# Patient Record
Sex: MALE | Race: WHITE | Employment: UNEMPLOYED | URBAN - METROPOLITAN AREA
[De-identification: names, ages, dates, MRNs, and addresses within clinical notes are randomized per-mention and may not be internally consistent; named-entity substitution may affect disease eponyms.]

---

## 2017-04-13 ENCOUNTER — HOSPITAL ENCOUNTER (OUTPATIENT)
Dept: MRI IMAGING | Age: 22
Discharge: HOME | End: 2017-04-13
Attending: ORTHOPAEDIC SURGERY
Payer: MEDICAID

## 2017-04-13 DIAGNOSIS — M25.461 PAIN AND SWELLING OF RIGHT KNEE: ICD-10-CM

## 2017-04-13 DIAGNOSIS — M25.561 PAIN AND SWELLING OF RIGHT KNEE: ICD-10-CM

## 2017-04-13 PROCEDURE — 73721 MRI JNT OF LWR EXTRE W/O DYE: CPT

## 2017-12-03 ENCOUNTER — HOSPITAL ENCOUNTER (EMERGENCY)
Age: 22
Discharge: HOME | End: 2017-12-03
Attending: EMERGENCY MEDICINE
Payer: MEDICAID

## 2017-12-03 VITALS
HEART RATE: 96 BPM | RESPIRATION RATE: 17 BRPM | SYSTOLIC BLOOD PRESSURE: 135 MMHG | BODY MASS INDEX: 25.9 KG/M2 | TEMPERATURE: 98.5 F | WEIGHT: 185 LBS | HEIGHT: 71 IN | OXYGEN SATURATION: 100 % | DIASTOLIC BLOOD PRESSURE: 86 MMHG

## 2017-12-03 DIAGNOSIS — T69.029A TRENCH FOOT, UNSPECIFIED LATERALITY, INITIAL ENCOUNTER: Primary | ICD-10-CM

## 2017-12-03 PROCEDURE — 99282 EMERGENCY DEPT VISIT SF MDM: CPT

## 2017-12-03 RX ORDER — CLOTRIMAZOLE AND BETAMETHASONE DIPROPIONATE 10; .64 MG/G; MG/G
CREAM TOPICAL 2 TIMES DAILY
Qty: 1 TUBE | Refills: 0 | Status: SHIPPED | OUTPATIENT
Start: 2017-12-03 | End: 2018-04-01
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
## 2017-12-03 NOTE — DISCHARGE INSTRUCTIONS
Keep feet dry. Change socks frequently. Antifungals (On the skin)   Treat fungus infections of the skin. Some of these infections are \"athlete's foot,\" \"jock itch,\" and yeast infections. Antifungal shampoo is used to treat dandruff. Brand Name(s): Aloe Vesta Antifungal, Anti-Fungal Powder, Antifungal, Athlete's Foot, Athlete's Foot Cream, Athlete's Foot Powder, Azolen, Bactivex Athlete's Foot & Cracked Heels, Renaldo Antifungal, Blis-To-Sol, Breezee Mist Foot Powder, CNL8, [place] Antifungal, Ciclodan, Ciclodan Cream Kit   There may be other brand names for this medicine. When This Medicine Should Not Be Used: You should not use this medicine if you have ever had an allergic reaction to antifungals such as econazole, ketoconazole, miconazole, butoconazole, clotrimazole, or terconazole. Unless your doctor tells you to, you should not use topical antifungals on children under 3years of age. You should not use for diaper rash. How to Use This Medicine:   Cream, Powder, Spray, Foam, Shampoo, Lotion, Gel/Jelly, Ointment, Spray, Liquid, Fizzy Tablet, Powder for Solution  · Your doctor will tell you how much medicine to use. Do not use more than directed. · Follow the instructions on the medicine label if you are using this medicine without a prescription. · Wash your hands with soap and water before and after you use this medicine. · If you are using this medicine with a prescription, the medicine might come with patient instructions. Read and follow these instructions carefully. Ask your doctor or pharmacist if you have any questions. · Use this medicine only on your skin. Rinse it off right away if it gets on a cut or scrape. Do not get the medicine in your eyes, nose, or mouth. · To use the cream, liquid, powder, lotion, gel, or ointment: Before putting this medicine on, wash the skin with soap and water, and then dry with a towel. Apply a thin layer of the medicine to the affected area.  Rub it in gently. Use on both feet if you have athlete's foot. You might need to shake the liquid or lotion before using it. · To use the powder: Before putting this medicine on, wash the skin with soap and water, and then dry with a towel. Apply a thin layer to the affected area. Use on both feet and inside your socks if you have athlete's foot. · To use the spray: Shake the spray can just before using. Hold the can 6 to 10 inches from the skin and spray. If you use the aerosol spray on your feet, spray it on the top, bottom, and between the toes of both feet. Also spray some in your socks and shoes (treat both feet the same). · To use the shampoo: Wet hair and scalp thoroughly with water. Work the shampoo into a lather and gently massage it over your whole scalp for about 1 minute. Rinse the hair thoroughly. Wash hair again and leave the shampoo on your scalp for another 3 minutes. Shampoo every 4 days for 4 weeks. Always allow at least 3 days between using the shampoo. · Keep using the medicine for as long as your doctor has told you to, even if you think your symptoms are gone. If you do not use your medicine for the full treatment time, your infection may return. · Do not inhale the aerosol spray or use it near heat, open flame, or while smoking. Do not puncture, break, or burn the aerosol can. · Throw away the pad or swab after using it. Do not save an opened pad or swab to use later. · Do not cover the treated area with a bandage unless directed by your doctor. If a dose is missed:   · Take a dose as soon as you remember. If it is almost time for your next dose, wait until then and take a regular dose. Do not take extra medicine to make up for a missed dose. How to Store and Dispose of This Medicine:   · Store the medicine in a closed container at room temperature, away from heat, moisture, and direct light. Do not freeze.   · Keep all medicine away from children and never share your medicine with anyone. Drugs and Foods to Avoid:   Ask your doctor or pharmacist before using any other medicine, including over-the-counter medicines, vitamins, and herbal products. · You should not use other medicines on the same area of skin unless your doctor tells you to. · Do not put cosmetics or skin care products on the treated skin. Warnings While Using This Medicine:   · If you are pregnant (or may become pregnant) or breastfeeding, talk to your doctor before using this medicine. · The liquid and spray forms of this medicine might be flammable. Keep them away from high heat and open flames at all times. · If your symptoms do not get better after about a week, or get worse while using this medicine, call your doctor. · Antifungal shampoo may remove the curls from hair that has been treated with a permanent. · Each antifungal medicine was made to treat a certain kind of infection. Use only the medicine your doctor has prescribed. You should not use an over-the-counter product in place of what your doctor has told you to use. Possible Side Effects While Using This Medicine:   Call your doctor right away if you notice any of these side effects:  · Itching, rash, swelling, or redness that was not there before you used this medicine  If you notice these less serious side effects, talk with your doctor:   · Stinging, burning, or redness where medicine is put on  · Dry or oily hair (with shampoo use)  If you notice other side effects that you think are caused by this medicine, tell your doctor. Call your doctor for medical advice about side effects. You may report side effects to FDA at 5-944-FDA-6803  © 2017 [address] [employer] [employer] Information is for End User's use only and may not be sold, redistributed or otherwise used for commercial purposes. The above information is an  only. It is not intended as medical advice for individual conditions or treatments.  Talk to your doctor, nurse or pharmacist before following any medical regimen to see if it is safe and effective for you.

## 2018-04-01 ENCOUNTER — HOSPITAL ENCOUNTER (EMERGENCY)
Age: 23
Discharge: ANOTHER HOSPITAL | End: 2018-04-01
Attending: EMERGENCY MEDICINE
Payer: MEDICAID

## 2018-04-01 ENCOUNTER — APPOINTMENT (OUTPATIENT)
Dept: GENERAL RADIOLOGY | Age: 23
End: 2018-04-01
Attending: EMERGENCY MEDICINE
Payer: MEDICAID

## 2018-04-01 ENCOUNTER — HOSPITAL ENCOUNTER (INPATIENT)
Age: 23
LOS: 4 days | Discharge: HOME | DRG: 750 | End: 2018-04-05
Attending: PSYCHIATRY & NEUROLOGY | Admitting: PSYCHIATRY & NEUROLOGY
Payer: MEDICAID

## 2018-04-01 VITALS
SYSTOLIC BLOOD PRESSURE: 108 MMHG | BODY MASS INDEX: 25.1 KG/M2 | WEIGHT: 180 LBS | HEART RATE: 86 BPM | TEMPERATURE: 98.2 F | DIASTOLIC BLOOD PRESSURE: 68 MMHG | RESPIRATION RATE: 16 BRPM | OXYGEN SATURATION: 98 %

## 2018-04-01 DIAGNOSIS — R45.851 SUICIDAL IDEATIONS: ICD-10-CM

## 2018-04-01 DIAGNOSIS — F20.9 SCHIZOPHRENIA, UNSPECIFIED TYPE (HCC): Primary | ICD-10-CM

## 2018-04-01 DIAGNOSIS — Z86.59 HISTORY OF COMMAND HALLUCINATIONS: ICD-10-CM

## 2018-04-01 PROBLEM — F25.9 SCHIZOAFFECTIVE DISORDER (HCC): Status: ACTIVE | Noted: 2018-04-01

## 2018-04-01 LAB
AMPHET UR QL SCN: NEGATIVE
ANION GAP SERPL CALC-SCNC: 5 MMOL/L (ref 3–18)
BARBITURATES UR QL SCN: NEGATIVE
BASOPHILS # BLD: 0 K/UL (ref 0–0.06)
BASOPHILS NFR BLD: 1 % (ref 0–2)
BENZODIAZ UR QL: NEGATIVE
BUN SERPL-MCNC: 9 MG/DL (ref 7–18)
BUN/CREAT SERPL: 13 (ref 12–20)
CALCIUM SERPL-MCNC: 8.9 MG/DL (ref 8.5–10.1)
CANNABINOIDS UR QL SCN: POSITIVE
CHLORIDE SERPL-SCNC: 111 MMOL/L (ref 100–108)
CO2 SERPL-SCNC: 25 MMOL/L (ref 21–32)
COCAINE UR QL SCN: NEGATIVE
CREAT SERPL-MCNC: 0.7 MG/DL (ref 0.6–1.3)
DIFFERENTIAL METHOD BLD: ABNORMAL
EOSINOPHIL # BLD: 0.1 K/UL (ref 0–0.4)
EOSINOPHIL NFR BLD: 2 % (ref 0–5)
ERYTHROCYTE [DISTWIDTH] IN BLOOD BY AUTOMATED COUNT: 12.9 % (ref 11.6–14.5)
ETHANOL SERPL-MCNC: <3 MG/DL (ref 0–3)
GLUCOSE SERPL-MCNC: 83 MG/DL (ref 74–99)
HCT VFR BLD AUTO: 44.6 % (ref 36–48)
HDSCOM,HDSCOM: ABNORMAL
HGB BLD-MCNC: 15.3 G/DL (ref 13–16)
LITHIUM SERPL-SCNC: <0.2 MMOL/L (ref 0.6–1.2)
LYMPHOCYTES # BLD: 1.2 K/UL (ref 0.9–3.6)
LYMPHOCYTES NFR BLD: 18 % (ref 21–52)
MCH RBC QN AUTO: 31.1 PG (ref 24–34)
MCHC RBC AUTO-ENTMCNC: 34.3 G/DL (ref 31–37)
MCV RBC AUTO: 90.7 FL (ref 74–97)
METHADONE UR QL: NEGATIVE
MONOCYTES # BLD: 0.5 K/UL (ref 0.05–1.2)
MONOCYTES NFR BLD: 8 % (ref 3–10)
NEUTS SEG # BLD: 4.7 K/UL (ref 1.8–8)
NEUTS SEG NFR BLD: 71 % (ref 40–73)
OPIATES UR QL: NEGATIVE
PCP UR QL: NEGATIVE
PLATELET # BLD AUTO: 119 K/UL (ref 135–420)
PMV BLD AUTO: 11 FL (ref 9.2–11.8)
POTASSIUM SERPL-SCNC: 3.8 MMOL/L (ref 3.5–5.5)
RBC # BLD AUTO: 4.92 M/UL (ref 4.7–5.5)
SODIUM SERPL-SCNC: 141 MMOL/L (ref 136–145)
WBC # BLD AUTO: 6.6 K/UL (ref 4.6–13.2)

## 2018-04-01 PROCEDURE — 80307 DRUG TEST PRSMV CHEM ANLYZR: CPT | Performed by: EMERGENCY MEDICINE

## 2018-04-01 PROCEDURE — 80178 ASSAY OF LITHIUM: CPT | Performed by: EMERGENCY MEDICINE

## 2018-04-01 PROCEDURE — 85025 COMPLETE CBC W/AUTO DIFF WBC: CPT | Performed by: EMERGENCY MEDICINE

## 2018-04-01 PROCEDURE — 65220000003 HC RM SEMIPRIVATE PSYCH

## 2018-04-01 PROCEDURE — 80048 BASIC METABOLIC PNL TOTAL CA: CPT | Performed by: EMERGENCY MEDICINE

## 2018-04-01 PROCEDURE — 74011250637 HC RX REV CODE- 250/637: Performed by: EMERGENCY MEDICINE

## 2018-04-01 PROCEDURE — 99285 EMERGENCY DEPT VISIT HI MDM: CPT

## 2018-04-01 PROCEDURE — 73564 X-RAY EXAM KNEE 4 OR MORE: CPT

## 2018-04-01 RX ORDER — LORAZEPAM 1 MG/1
1 TABLET ORAL
Status: DISCONTINUED | OUTPATIENT
Start: 2018-04-01 | End: 2018-04-05 | Stop reason: HOSPADM

## 2018-04-01 RX ORDER — TRAZODONE HYDROCHLORIDE 100 MG/1
100 TABLET ORAL
Status: DISCONTINUED | OUTPATIENT
Start: 2018-04-01 | End: 2018-04-01 | Stop reason: HOSPADM

## 2018-04-01 RX ORDER — RISPERIDONE 1 MG/1
4 TABLET, FILM COATED ORAL DAILY
Status: DISCONTINUED | OUTPATIENT
Start: 2018-04-01 | End: 2018-04-01 | Stop reason: HOSPADM

## 2018-04-01 RX ORDER — LORAZEPAM 1 MG/1
1 TABLET ORAL
Status: DISCONTINUED | OUTPATIENT
Start: 2018-04-01 | End: 2018-04-01 | Stop reason: HOSPADM

## 2018-04-01 RX ORDER — IBUPROFEN 400 MG/1
400 TABLET ORAL
Status: COMPLETED | OUTPATIENT
Start: 2018-04-01 | End: 2018-04-01

## 2018-04-01 RX ORDER — ACETAMINOPHEN 500 MG
1000 TABLET ORAL
Status: COMPLETED | OUTPATIENT
Start: 2018-04-01 | End: 2018-04-01

## 2018-04-01 RX ORDER — LORAZEPAM 2 MG/ML
1 INJECTION INTRAMUSCULAR
Status: DISCONTINUED | OUTPATIENT
Start: 2018-04-01 | End: 2018-04-05 | Stop reason: HOSPADM

## 2018-04-01 RX ORDER — BENZTROPINE MESYLATE 0.5 MG/1
1 TABLET ORAL 2 TIMES DAILY
COMMUNITY
End: 2018-04-05

## 2018-04-01 RX ORDER — RISPERIDONE 0.5 MG/1
0.5 TABLET, FILM COATED ORAL 2 TIMES DAILY
Status: DISCONTINUED | OUTPATIENT
Start: 2018-04-02 | End: 2018-04-02

## 2018-04-01 RX ORDER — TRAZODONE HYDROCHLORIDE 100 MG/1
100 TABLET ORAL
Status: DISCONTINUED | OUTPATIENT
Start: 2018-04-02 | End: 2018-04-05 | Stop reason: HOSPADM

## 2018-04-01 RX ORDER — LORAZEPAM 2 MG/ML
2 INJECTION INTRAMUSCULAR
Status: DISCONTINUED | OUTPATIENT
Start: 2018-04-01 | End: 2018-04-02

## 2018-04-01 RX ORDER — LORAZEPAM 1 MG/1
2 TABLET ORAL
Status: DISCONTINUED | OUTPATIENT
Start: 2018-04-01 | End: 2018-04-02

## 2018-04-01 RX ORDER — BENZTROPINE MESYLATE 1 MG/1
1-2 TABLET ORAL
Status: DISCONTINUED | OUTPATIENT
Start: 2018-04-01 | End: 2018-04-02

## 2018-04-01 RX ORDER — HALOPERIDOL 5 MG/ML
5 INJECTION INTRAMUSCULAR
Status: DISCONTINUED | OUTPATIENT
Start: 2018-04-01 | End: 2018-04-05 | Stop reason: HOSPADM

## 2018-04-01 RX ORDER — HALOPERIDOL 5 MG/1
5 TABLET ORAL
Status: DISCONTINUED | OUTPATIENT
Start: 2018-04-01 | End: 2018-04-05 | Stop reason: HOSPADM

## 2018-04-01 RX ORDER — LITHIUM CARBONATE 300 MG/1
300 CAPSULE ORAL 2 TIMES DAILY WITH MEALS
COMMUNITY
End: 2018-04-05

## 2018-04-01 RX ORDER — BENZTROPINE MESYLATE 1 MG/1
0.5 TABLET ORAL DAILY
Status: DISCONTINUED | OUTPATIENT
Start: 2018-04-01 | End: 2018-04-01 | Stop reason: HOSPADM

## 2018-04-01 RX ORDER — BENZTROPINE MESYLATE 1 MG/ML
1-2 INJECTION INTRAMUSCULAR; INTRAVENOUS
Status: DISCONTINUED | OUTPATIENT
Start: 2018-04-01 | End: 2018-04-02

## 2018-04-01 RX ORDER — RISPERIDONE 4 MG/1
4 TABLET, FILM COATED ORAL 2 TIMES DAILY
COMMUNITY
End: 2018-04-05

## 2018-04-01 RX ADMIN — RISPERIDONE 4 MG: 1 TABLET ORAL at 16:34

## 2018-04-01 RX ADMIN — IBUPROFEN 400 MG: 400 TABLET, FILM COATED ORAL at 13:57

## 2018-04-01 RX ADMIN — ACETAMINOPHEN 1000 MG: 500 TABLET ORAL at 13:57

## 2018-04-01 RX ADMIN — BENZTROPINE MESYLATE 0.5 MG: 1 TABLET ORAL at 16:33

## 2018-04-01 NOTE — ED NOTES
Bedside and Verbal shift change report given to Batsheva Zhao RN (oncoming nurse) by Valentin Coronado RN (offgoing nurse). Report included the following information ED Summary and MAR. Patient drinking water but still unable to void. Left knee pain 5/10.

## 2018-04-01 NOTE — PROGRESS NOTES
[place] back from [facility] admissions for Parth Josue. She said they needed the lithium levels returned before she could present patient for admission to psychiatry. Informed her that I would be leaving and to call back the main ED number when / if a bed is available.

## 2018-04-01 NOTE — IP AVS SNAPSHOT
[facility] 
 
 
 [address] [facility] [address] [place] [address] Drive Patient: Chase De Anda MRN: [id number] :[id number] A check angeline indicates which time of day the medication should be taken. My Medications CHANGE how you take these medications Instructions Each Dose to Equal  
 Morning Noon Evening Bedtime  
 risperiDONE 1 mg tablet Commonly known as:  RisperDAL What changed:   
- medication strength 
- how much to take - when to take this Your last dose was: Your next dose is: Take 1 Tab by mouth two (2) times a day. Indications: mood stabilization 1 mg STOP taking these medications   
 benztropine 0.5 mg tablet Commonly known as:  COGENTIN  
   
  
 lithium carbonate 300 mg capsule Where to Get Your Medications Information on where to get these meds will be given to you by the nurse or doctor. ! Ask your nurse or doctor about these medications  
  risperiDONE 1 mg tablet

## 2018-04-01 NOTE — PROGRESS NOTES
Patient in need of inpatient psychiatric bed. Referred to [facility] admitting. They will review and call back.

## 2018-04-01 NOTE — ED PROVIDER NOTES
EMERGENCY DEPARTMENT HISTORY AND PHYSICAL EXAM    1:47 PM      Date: 4/1/2018  Patient Name: Roman Buckley    History of Presenting Illness     Chief Complaint   Patient presents with   [address] Problem    Knee Pain         History Provided By: Patient    Chief Complaint: Hallucinations  Duration:  Hours PTA  Timing:  Constant and Worsening  Location: N/A as complaint is not pain  Quality: auditory and visual  Severity: Severe  Modifying Factors: worse when not taking his meds  Associated Symptoms: L knee pain, SI      Additional History (Context): Roman Buckley is a 20 y.o. male with schizoaffective disorder, IED, bipolar disorder who presents per NPD c/o auditory and visual hallucinations starting today. He says \"the voices\" are telling him to run and insult doctors, saying they are useless and that he doesn't need them. They are also telling him to kill himself. Pt notes past SI with attempt of wrist cutting. Denies plan at this time but then states that he is thinking of ramming his head in the wall which he says he wouldn't actually do. Visual hallucinations include seeing a demon right in front of him; this demon has been with him since he was 5. Pt denies HI. He states he has been noncompliant with his psychiatric medication for 6 months because he became homeless and lost track of his appointments. However pt states he used to see his PCP monthly and sees his therapist weekly. In ED pt is also c/o severe L knee pain from falling on black ice 1 yr ago. Denies recent re-injury or trauma. Pt denies illicit drug use but states he does have \"weed in his system\" to help his knee pain. PCP: Anai Cortes MD    Current Outpatient Prescriptions   Medication Sig Dispense Refill    risperiDONE (RISPERDAL) 4 mg tablet Take 4 mg by mouth two (2) times a day.  lithium carbonate 300 mg capsule Take 300 mg by mouth two (2) times daily (with meals).       benztropine (COGENTIN) 0.5 mg tablet Take 1 mg by mouth two (2) times a day. Past History     Past Medical History:  Past Medical History:   Diagnosis Date    Bipolar 1 disorder ([address]     Intermittent explosive disorder     Psychiatric disorder     schitzophrenic       Past Surgical History:  History reviewed. No pertinent surgical history. Family History:  History reviewed. No pertinent family history. Social History:  Social History   Substance Use Topics    Smoking status: Current Every Day Smoker    Smokeless tobacco: Never Used    Alcohol use None       Allergies:  No Known Allergies      Review of Systems       Review of Systems   Constitutional: Negative for chills and fever. HENT: Negative for ear pain and sore throat. Eyes: Negative for pain and visual disturbance. Respiratory: Negative for cough and shortness of breath. Cardiovascular: Negative for chest pain and palpitations. Gastrointestinal: Negative for abdominal pain, diarrhea, nausea and vomiting. Genitourinary: Negative for flank pain. Musculoskeletal: Positive for arthralgias (L knee). Negative for back pain and neck pain. Neurological: Negative for syncope and headaches. Psychiatric/Behavioral: Positive for hallucinations and suicidal ideas. Negative for agitation and self-injury. The patient is not nervous/anxious. Physical Exam     Visit Vitals    /81 (BP 1 Location: Left arm, BP Patient Position: Sitting)    Pulse (!) 111    Temp 97.8 °F (36.6 °C)    Resp 20    SpO2 96%         Physical Exam   Constitutional: He is oriented to person, place, and time. He appears well-developed and well-nourished. HENT:   Head: Normocephalic and atraumatic. Mouth/Throat: Oropharynx is clear and moist.   Eyes: Pupils are equal, round, and reactive to light. No scleral icterus. Neck: Neck supple. No tracheal deviation present. Cardiovascular: Regular rhythm. No murmur heard.   Pulmonary/Chest: Effort normal and breath sounds normal. No respiratory distress. Abdominal: Soft. There is no tenderness. Musculoskeletal: Normal range of motion. He exhibits no deformity. L knee mild effusion, no warmth, no erythema, no joint line tenderness  Ttp diffusely on superior knee   Neurological: He is alert and oriented to person, place, and time. No gross neuro deficit   Skin: Skin is warm and dry. No rash noted. He is not diaphoretic. Psychiatric: He has a normal mood and affect. He is actively hallucinating (auditory and visual). He expresses suicidal ideation. He expresses no homicidal ideation. He expresses no suicidal plans. Pressured speech   Nursing note and vitals reviewed. Diagnostic Study Results     Labs -  Labs Reviewed   CBC WITH AUTOMATED DIFF - Abnormal; Notable for the following:        Result Value    PLATELET 586 (*)     LYMPHOCYTES 18 (*)     All other components within normal limits   METABOLIC PANEL, BASIC   ETHYL ALCOHOL   DRUG SCREEN, URINE       Radiologic Studies -   XR KNEE LT MIN 4 V    (Results Pending)         Medical Decision Making   I am the first provider for this patient. I reviewed the vital signs, available nursing notes, past medical history, past surgical history, family history and social history. Vital Signs-Reviewed the patient's vital signs. Pulse Oximetry Analysis -  96% on room air (Interpretation) nonhypoxic     Cardiac Monitor:  Rate: 111    Records Reviewed: Nursing Notes (Time of Review: 1:47 PM)    ED Course: Progress Notes, Reevaluation, and Consults:  ED Course       Provider Notes (Medical Decision Making):     DDX: psychosis, untreated schizophrenia, bipolar d/o, arthritis, tendonitis, effusion     20 y.o. male with noted past medical history who presented with auditory and visual hallucinations with command suicidal ideations and medication non-compliance scared and seeking help.      Diagnostics notable for no significant finding on knee XR, no warmth or erythema on exam so I am not concerned for infection, likely chronic pain due to tendonitis or cartilaginous injury. Tele-psych recommended inpatient stabilization, pt voluntary and re-starting Risperdal and cogentin, with Trazodone PRN. Case management consulted, pending placement. Diagnosis     Clinical Impression:   1. Schizophrenia, unspecified type ([address]    2. Suicidal ideations    3. History of command hallucinations        Disposition:     1500 : Pt care transferred to Dr. Norman Rowell provider. History of patient complaint(s), available diagnostic reports and current treatment plan has been discussed thoroughly. Bedside rounding on patient occured : yes . Intended disposition of patient : Transfer  Pending diagnostics reports and/or labs (please list): Pending telepsych evaluation and most likely voluntary admission       Follow-up Information     None           Patient's Medications   Start Taking    No medications on file   Continue Taking    BENZTROPINE (COGENTIN) 0.5 MG TABLET    Take 1 mg by mouth two (2) times a day. LITHIUM CARBONATE 300 MG CAPSULE    Take 300 mg by mouth two (2) times daily (with meals). RISPERIDONE (RISPERDAL) 4 MG TABLET    Take 4 mg by mouth two (2) times a day. These Medications have changed    No medications on file   Stop Taking    CLOTRIMAZOLE-BETAMETHASONE (LOTRISONE) TOPICAL CREAM    Apply  to affected area two (2) times a day. Apply to affected area     _______________________________    Paola Wall acting as a scribe for and in the presence of Cassy Chairez DO      April 01, 2018 at 2:34 PM       Provider Attestation:      I personally performed the services described in the documentation, reviewed the documentation, as recorded by the scribe in my presence, and it accurately and completely records my words and actions.  April 01, 2018 at 2:34 PM - Cassy Chairez DO        Note:  Assuming care of patient at beginning of shift    7:43 PM  Charo MCKEON Irene Aaron MD, assumed care of patient at the beginning of my shift. 7:43 PM    Date: 4/1/2018  Patient Name: Noemy Graham    History of Presenting Illness     Chief Complaint   Patient presents with    Mental Health Problem    Knee Pain   Nursing notes regarding the HPI and triage nursing notes were reviewed. Prior medical records were reviewed. Current Facility-Administered Medications   Medication Dose Route Frequency Provider Last Rate Last Dose    risperiDONE (RisperDAL) tablet 4 mg  4 mg Oral DAILY Stephanie Terrya, DO   4 mg at 04/01/18 1634    benztropine (COGENTIN) tablet 0.5 mg  0.5 mg Oral DAILY Jennifer Kos, DO   0.5 mg at 04/01/18 1633    traZODone (DESYREL) tablet 100 mg  100 mg Oral QHS PRN Jennifer Kos, DO        LORazepam (ATIVAN) tablet 1 mg  1 mg Oral ONCE PRN Jennifer Kos, DO         Current Outpatient Prescriptions   Medication Sig Dispense Refill    risperiDONE (RISPERDAL) 4 mg tablet Take 4 mg by mouth two (2) times a day.  lithium carbonate 300 mg capsule Take 300 mg by mouth two (2) times daily (with meals).  benztropine (COGENTIN) 0.5 mg tablet Take 1 mg by mouth two (2) times a day. Past History     Past Medical History:  Past Medical History:   Diagnosis Date    Bipolar 1 disorder ([address]     Intermittent explosive disorder     Psychiatric disorder     schitzophrenic       Past Surgical History:  History reviewed. No pertinent surgical history. Family History:  History reviewed. No pertinent family history.     Social History:  Social History   Substance Use Topics    Smoking status: Current Every Day Smoker    Smokeless tobacco: Never Used    Alcohol use None       Allergies:  No Known Allergies    Patient's primary care provider (as noted in EPIC):  Ayo Mcdonald MD    Abnormal lab results from this emergency department encounter:  Labs Reviewed   CBC WITH AUTOMATED DIFF - Abnormal; Notable for the following:        Result Value PLATELET 313 (*)     LYMPHOCYTES 18 (*)     All other components within normal limits   METABOLIC PANEL, BASIC - Abnormal; Notable for the following:     Chloride 111 (*)     All other components within normal limits   DRUG SCREEN, URINE - Abnormal; Notable for the following:     THC (TH-CANNABINOL) POSITIVE (*)     All other components within normal limits   LITHIUM - Abnormal; Notable for the following:     Lithium level <0.20 (*)     All other components within normal limits   ETHYL ALCOHOL       Lab values for this patient within approximately the last 12 hours:  Recent Results (from the past 12 hour(s))   CBC WITH AUTOMATED DIFF    Collection Time: 04/01/18  2:00 PM   Result Value Ref Range    WBC 6.6 4.6 - 13.2 K/uL    RBC 4.92 4.70 - 5.50 M/uL    HGB 15.3 13.0 - 16.0 g/dL    HCT 44.6 36.0 - 48.0 %    MCV 90.7 74.0 - 97.0 FL    MCH 31.1 24.0 - 34.0 PG    MCHC 34.3 31.0 - 37.0 g/dL    RDW 12.9 11.6 - 14.5 %    PLATELET 203 (L) 140 - 420 K/uL    MPV 11.0 9.2 - 11.8 FL    NEUTROPHILS 71 40 - 73 %    LYMPHOCYTES 18 (L) 21 - 52 %    MONOCYTES 8 3 - 10 %    EOSINOPHILS 2 0 - 5 %    BASOPHILS 1 0 - 2 %    ABS. NEUTROPHILS 4.7 1.8 - 8.0 K/UL    ABS. LYMPHOCYTES 1.2 0.9 - 3.6 K/UL    ABS. MONOCYTES 0.5 0.05 - 1.2 K/UL    ABS. EOSINOPHILS 0.1 0.0 - 0.4 K/UL    ABS.  BASOPHILS 0.0 0.0 - 0.06 K/UL    DF AUTOMATED     METABOLIC PANEL, BASIC    Collection Time: 04/01/18  2:00 PM   Result Value Ref Range    Sodium 141 136 - 145 mmol/L    Potassium 3.8 3.5 - 5.5 mmol/L    Chloride 111 (H) 100 - 108 mmol/L    CO2 25 21 - 32 mmol/L    Anion gap 5 3.0 - 18 mmol/L    Glucose 83 74 - 99 mg/dL    BUN 9 7.0 - 18 MG/DL    Creatinine 0.70 0.6 - 1.3 MG/DL    BUN/Creatinine ratio 13 12 - 20      GFR est AA >60 >60 ml/min/1.73m2    GFR est non-AA >60 >60 ml/min/1.73m2    Calcium 8.9 8.5 - 10.1 MG/DL   ETHYL ALCOHOL    Collection Time: 04/01/18  2:00 PM   Result Value Ref Range    ALCOHOL(ETHYL),SERUM <3 0 - 3 MG/DL   LITHIUM    Collection Time: 04/01/18  2:00 PM   Result Value Ref Range    Lithium level <0.20 (L) 0.6 - 1.2 MMOL/L   DRUG SCREEN, URINE    Collection Time: 04/01/18  4:37 PM   Result Value Ref Range    BENZODIAZEPINES NEGATIVE  NEG      BARBITURATES NEGATIVE  NEG      THC (TH-CANNABINOL) POSITIVE (A) NEG      OPIATES NEGATIVE  NEG      PCP(PHENCYCLIDINE) NEGATIVE  NEG      COCAINE NEGATIVE  NEG      AMPHETAMINES NEGATIVE  NEG      METHADONE NEGATIVE  NEG      HDSCOM (NOTE)        Radiologist and cardiologist interpretations if available at time of this note:  Radiology results:  Xr Knee Lt Min 4 V    Result Date: 4/1/2018  Examination: Left knee 4 views. HISTORY: Left knee pain for several days. FINDINGS: AP, oblique, lateral views of the left knee are interpreted without comparison. Osseous alignment is normal. No fracture. No joint effusion. Normal joint spaces. No retained radiopaque foreign object. IMPRESSION: 1. No acute osseous abnormality involving the left knee        Medication(s) ordered for patient during this emergency visit encounter:  Medications   risperiDONE (RisperDAL) tablet 4 mg (4 mg Oral Given 4/1/18 1634)   benztropine (COGENTIN) tablet 0.5 mg (0.5 mg Oral Given 4/1/18 1633)   traZODone (DESYREL) tablet 100 mg (not administered)   LORazepam (ATIVAN) tablet 1 mg (not administered)   acetaminophen (TYLENOL) tablet 1,000 mg (1,000 mg Oral Given 4/1/18 1357)   ibuprofen (MOTRIN) tablet 400 mg (400 mg Oral Given 4/1/18 1357)       Pt care assumed from Dr. Diomedes Stone , ED provider. Pt complaint(s), current treatment plan, progression and available diagnostic results have been discussed thoroughly. Rounding occurred: no  Intended Disposition: Transfer   Pending diagnostic reports and/or labs (please list):  Case management transfer of psychotic paranoid patient to a community behavioral unit bed. Coding Diagnoses     Clinical Impression:   1. Schizophrenia, unspecified type ([facility] [address]    2. Suicidal ideations    3. History of command hallucinations        Disposition     Disposition:  Transfer. Charo Mayo M.D.   [facility] Board Certified Emergency Physician

## 2018-04-01 NOTE — IP AVS SNAPSHOT
[facility] 
 
 
 [address] Patient: Shona Plasencia MRN: [id number] :[id number] About your hospitalization You were admitted on:  2018 You last received care in the:  [facility] [address] You were discharged on:  2018 Why you were hospitalized Your primary diagnosis was:  Schizoaffective Disorder (Hcc) Follow-up Information Follow up With Details Comments Contact Info Torrey Larson MD   [address] [facility] 
[phone] Pt. Has an appointment scheduled with Dr. Isael Escobar  on 18 @ 10:30 a.m for medication management at [facility] [address], [address] [facility]  Phone: [phone] Discharge Orders None A check angeline indicates which time of day the medication should be taken. My Medications CHANGE how you take these medications Instructions Each Dose to Equal  
 Morning Noon Evening Bedtime  
 risperiDONE 1 mg tablet Commonly known as:  RisperDAL What changed:   
- medication strength 
- how much to take - when to take this Your last dose was: Your next dose is: Take 1 Tab by mouth two (2) times a day. Indications: mood stabilization 1 mg STOP taking these medications   
 benztropine 0.5 mg tablet Commonly known as:  COGENTIN  
   
  
 lithium carbonate 300 mg capsule Where to Get Your Medications Information on where to get these meds will be given to you by the nurse or doctor. ! Ask your nurse or doctor about these medications  
  risperiDONE 1 mg tablet Discharge Instructions BEHAVIORAL HEALTH NURSING DISCHARGE NOTE The following personal items collected during your admission are returned to you:  
Dental Appliance: Dental Appliances: None Vision: Visual Aid: None Hearing Aid:   
Jewelry: Jewelry: None Clothing: Clothing: Gurwinder Johnson Other Valuables: Other Valuables: Cell Phone, Cigarettes, Money (comment), Wallet (he had 1.93 cash cell phone ) Valuables sent to safe:   
 
 
PATIENT INSTRUCTIONS: 
  
 
 
The discharge information has been reviewed with the patient. The patient verbalized understanding. Patient armband removed and shredded Introducing [facility]! Rubens Mitchell introduces [employer] patient portal. Now you can access parts of your medical record, email your doctor's office, and request medication refills online. 1. In your internet browser, go to [email or web address] 2. Click on the First Time User? Click Here link in the Sign In box. You will see the New Member Sign Up page. 3. Enter your [employer] Access Code exactly as it appears below. You will not need to use this code after youve completed the sign-up process. If you do not sign up before the expiration date, you must request a new code. · [employer] Access Code: [email or web address] Expires: 6/30/2018  7:17 PM 
 
4. Enter the last four digits of your Social Security Number (xxxx) and Date of Birth (mm/dd/yyyy) as indicated and click Submit. You will be taken to the next sign-up page. 5. Create a [employer] ID. This will be your [employer] login ID and cannot be changed, so think of one that is secure and easy to remember. 6. Create a [employer] password. You can change your password at any time. 7. Enter your Password Reset Question and Answer. This can be used at a later time if you forget your password. 8. Enter your e-mail address. You will receive e-mail notification when new information is available in [address] 9. Click Sign Up. You can now view and download portions of your medical record. 10. Click the Download Summary menu link to download a portable copy of your medical information. If you have questions, please visit the Frequently Asked Questions section of the [employer] website. Remember, [employer] is NOT to be used for urgent needs. For medical emergencies, dial 911. Now available from your iPhone and Android! Introducing [employer] Fox As a Reddy Patel patient, I wanted to make you aware of our electronic visit tool called [employer]. [employer]7 allows you to connect within minutes with a medical provider 24 hours a day, seven days a week via a mobile device or tablet or logging into a secure website from your computer. You can access Manuel Fox from anywhere in the [place]. A virtual visit might be right for you when you have a simple condition and feel like you just dont want to get out of bed, or cant get away from work for an appointment, when your regular [facility] provider is not available (evenings, weekends or holidays), or when youre out of town and need minor care. Electronic visits cost only $49 and if the [employer] 24/[employer] provider determines a prescription is needed to treat your condition, one can be electronically transmitted to a nearby pharmacy*. Please take a moment to enroll today if you have not already done so. The enrollment process is free and takes just a few minutes. To enroll, please download the [employer] 24/7 guido to your tablet or phone, or visit [email or web address] to enroll on your computer. And, as an [address] [place] [address] patient with a [employer] account, the results of your visits will be scanned into your electronic medical record and your primary care provider will be able to view the scanned results. We urge you to continue to see your regular [facility] provider for your ongoing medical care.   And while your primary care provider may not be the one available when you seek a [employer] Fox virtual visit, the peace of mind you get from getting a real diagnosis real time can be priceless. For more information on Manuel Fox, view our Frequently Asked Questions (FAQs) at [email or web address]. Sincerely, 
 
Jorge Negrete MD 
Chief Medical Officer 50[address] Rivera *:  certain medications cannot be prescribed via [employer] Unresulted Labs-Please follow up with your PCP about these lab tests Order Current Status VITAMIN D, 25 HYROXY PANEL In process Providers Seen During Your Hospitalization Provider Specialty Primary office phone Sharon Rosado MD Psychiatry [phone] Your Primary Care Physician (PCP) Primary Care Physician Office Phone Office Fax Олег Small [phone] You are allergic to the following No active allergies Recent Documentation Height Weight BMI Smoking Status 1.803 m 81.6 kg 25.1 kg/m2 Current Every Day Smoker Emergency Contacts Name Discharge Info Relation Home Work Mobile None,None DISCHARGE CAREGIVER [3] Other Relative [6] [phone] Patient Belongings The following personal items are in your possession at time of discharge: 
  Dental Appliances: None  Visual Aid: None      Home Medications: None   Jewelry: None  Clothing: Pants, Shirt    Other Valuables: Cell Phone, Cigarettes, Money (comment), Wallet (he had 1.93 cash cell phone ) Please provide this summary of care documentation to your next provider. Signatures-by signing, you are acknowledging that this After Visit Summary has been reviewed with you and you have received a copy. Patient Signature:  ____________________________________________________________ Date:  ____________________________________________________________  
  
Allyson Kelly Provider Signature:  ____________________________________________________________ Date:  ____________________________________________________________

## 2018-04-01 NOTE — ED NOTES
Bedside report received from MYRON Wright  Pt resting on the stretcher in NAD. Pertinent information reviewed including results, medication administration, and SBAR. Any questions pt presents answered. Any needs addressed.

## 2018-04-01 NOTE — CONSULTS
Tele-psychiatry consult is done with the help of onsite staff. Patient location: [facility])  Physician location: [place]    Patient Name: Rubi Hannah  Date: 2018  Time: 2:37 PM   : 1995    Reason for consult: SI, psychosis  History of Present Illness: Rubi Hannah is a 20 y.o. male with reported schizoaffective disorder and intermittent explosive disorder who is brought to the ED by police for psychosis. Patient is having command auditory hallucinations for self harm as well as for insulting other people. He is also having visual hallucinations of demons. Staff notes indicate that patient reported a thought of self harm via ramming his head against a wall. Patient has engaged in self harm by wrist cutting in the past. On interview, patient confirms the SI and command hallucinations for self harm. He describes his mood as \"very scared\" and agrees both that he needs to get back on his meds and that he needs to come in for inpatient psychiatric treatment. He reports being in [facility] unit several months ago and had a bad experience. This may have contributed to him being lost to psychiatric follow-up and being off his meds for the last 6 months. Patient is on disability and stay in hotels like the [place] until the money runs out. His intermittent homelessness is another contributor to his psychotropic noncompliance.  Patient denies any HI.      SI/HI/Self harm/Violence: +SI, +past self harm, no thoughts of violence    Sources of information: patient, EMR, hospital staff: Dr. Theresa Coley History/Treatment History: lost psychiatric follow-up for at least 6 months but reports seeing therapist as well as primary care regularly; most recent inpatient was at [facility] ago     Drug/Alcohol History: UDS: pending; admits to cannabis but has denied other illicits    Medical History: left knee pain  Past Medical History:   Diagnosis Date    Bipolar 1 disorder ([address]  Intermittent explosive disorder     Psychiatric disorder     schitzophrenic     Medications & Freq: not on meds for 6 months  Prior to Admission medications    Medication Sig Start Date End Date Taking? Authorizing Provider   risperiDONE (RISPERDAL) 4 mg tablet Take 4 mg by mouth two (2) times a day. Ruth Wolf MD   lithium carbonate 300 mg capsule Take 300 mg by mouth two (2) times daily (with meals). Ruth Wolf MD   benztropine (COGENTIN) 0.5 mg tablet Take 1 mg by mouth two (2) times a day. Ruth Wolf MD     Allergies: No Known Allergies  Family Psych History/History of suicide: denies any family diagnoses or self harm  Social History:    Employment: none, on disability   Living situation: intermittently homeless   Stressors: lost to psych f/u   Strengths: accepts help    Mental Status Exam:   Appearance and attire: disheveled  Attitude and behavior: cooperates with questions but visibly uneasy  Speech: normal rate/volume, fearful tone  Affect and mood: restricted to dysphoria, \"very scared\"  Association and thought processes: goal directed  Thought content: SI: active; HI: none  Perception: AVH: command AH for self harm and VH of demons; Delusions: paranoid  Sensorium and orientation: alert and oriented to situation  Insight and judgment: fair    Impression/Risk Assessment:   Noemy Graham is a 20 y.o. male with schizoaffective disorder who is brought to the ED by police for psychosis and SI in setting of being off psychotropics ~6months. Patient is having command AH for self harm and feels \"very scared. \" He is suicidal and has engaged in self harm in the past. No HI. He agrees he needs back on meds and also return to inpatient psychiatric treatment. Principal Diagnosis: F25.0 Schizoafffective disorder bipolar type    Treatment Recommendations:  1.  Disposition: inpatient psychiatry; voluntary for now but might need CSB evaluation if paranoia develops for this setting and patient asks to leave   2. Psychiatric medications: restart risperdal 4mg & cogentin 0.5mg now & qpm if still in the ED tomorrow; also allow for prn trazodone 100mg qhs tonight    The above were discussed with the patient and the referring provider; able parties stated understanding and agreement with the recommendations.     Electronically signed by Sierra Crisostomo M.D.

## 2018-04-01 NOTE — ED NOTES
Nga at [facility] called  [facility] acceptance pending lithium level  Running in lab at current time

## 2018-04-01 NOTE — ED NOTES
4:33 PM :Pt care assumed from Dr. Genoveva Escobar , ED provider. Pt complaint(s), current treatment plan, progression and available diagnostic results have been discussed thoroughly. Rounding occurred: yes  Intended Disposition: TBD  Pending diagnostic reports and/or labs (please list): Awaiting case management consult. 6:54 PM : Pt care transferred to Dr. Jana Haro  ,ED provider. History of patient complaint(s), available diagnostic reports and current treatment plan has been discussed thoroughly. Bedside rounding on patient occured : yes . Intended disposition of patient : TBD  Pending diagnostics reports and/or labs (please list):  Case management consult      Scribe Og Galindo acting as a scribe for and in the presence of Joni Lai MD  April 01, 2018 at 4:34 PM       Provider Attestation:      I personally performed the services described in the documentation, reviewed the documentation, as recorded by the scribe in my presence, and it accurately and completely records my words and actions.  April 01, 2018 at 4:34 PM - Joni Lai MD

## 2018-04-02 LAB
ALBUMIN SERPL-MCNC: 3.5 G/DL (ref 3.4–5)
ALBUMIN/GLOB SERPL: 1.3 {RATIO} (ref 0.8–1.7)
ALP SERPL-CCNC: 78 U/L (ref 45–117)
ALT SERPL-CCNC: 36 U/L (ref 16–61)
AST SERPL-CCNC: 16 U/L (ref 15–37)
BILIRUB DIRECT SERPL-MCNC: 0.1 MG/DL (ref 0–0.2)
BILIRUB SERPL-MCNC: 0.3 MG/DL (ref 0.2–1)
FOLATE SERPL-MCNC: 11.4 NG/ML (ref 3.1–17.5)
GLOBULIN SER CALC-MCNC: 2.6 G/DL (ref 2–4)
PROT SERPL-MCNC: 6.1 G/DL (ref 6.4–8.2)
TSH SERPL DL<=0.05 MIU/L-ACNC: 0.74 UIU/ML (ref 0.36–3.74)
VIT B12 SERPL-MCNC: 421 PG/ML (ref 211–911)

## 2018-04-02 PROCEDURE — 82746 ASSAY OF FOLIC ACID SERUM: CPT | Performed by: PSYCHIATRY & NEUROLOGY

## 2018-04-02 PROCEDURE — 82306 VITAMIN D 25 HYDROXY: CPT | Performed by: PSYCHIATRY & NEUROLOGY

## 2018-04-02 PROCEDURE — 80076 HEPATIC FUNCTION PANEL: CPT | Performed by: PSYCHIATRY & NEUROLOGY

## 2018-04-02 PROCEDURE — 36415 COLL VENOUS BLD VENIPUNCTURE: CPT | Performed by: PSYCHIATRY & NEUROLOGY

## 2018-04-02 PROCEDURE — 74011250637 HC RX REV CODE- 250/637: Performed by: PSYCHIATRY & NEUROLOGY

## 2018-04-02 PROCEDURE — 84443 ASSAY THYROID STIM HORMONE: CPT | Performed by: PSYCHIATRY & NEUROLOGY

## 2018-04-02 PROCEDURE — 65220000003 HC RM SEMIPRIVATE PSYCH

## 2018-04-02 RX ORDER — BENZTROPINE MESYLATE 1 MG/ML
1 INJECTION INTRAMUSCULAR; INTRAVENOUS
Status: DISCONTINUED | OUTPATIENT
Start: 2018-04-02 | End: 2018-04-05 | Stop reason: HOSPADM

## 2018-04-02 RX ORDER — RISPERIDONE 1 MG/1
1 TABLET, FILM COATED ORAL 2 TIMES DAILY
Status: DISCONTINUED | OUTPATIENT
Start: 2018-04-02 | End: 2018-04-05 | Stop reason: HOSPADM

## 2018-04-02 RX ORDER — IBUPROFEN 200 MG
1 TABLET ORAL DAILY
Status: DISCONTINUED | OUTPATIENT
Start: 2018-04-02 | End: 2018-04-05 | Stop reason: HOSPADM

## 2018-04-02 RX ORDER — BENZTROPINE MESYLATE 1 MG/1
1 TABLET ORAL
Status: DISCONTINUED | OUTPATIENT
Start: 2018-04-02 | End: 2018-04-05 | Stop reason: HOSPADM

## 2018-04-02 RX ADMIN — RISPERIDONE 0.5 MG: 0.5 TABLET ORAL at 07:59

## 2018-04-02 RX ADMIN — HALOPERIDOL 5 MG: 5 TABLET ORAL at 16:08

## 2018-04-02 RX ADMIN — LORAZEPAM 1 MG: 1 TABLET ORAL at 16:08

## 2018-04-02 NOTE — BSMART NOTE
ART THERAPY GROUP PROGRESS NOTE    Group time:[id number]    The patient was not disturbed for group at staff discretion.

## 2018-04-02 NOTE — BH NOTES
Patient was compliant with medications. He ate breakfast and lunch. He refused nicotine patch and stated \"I don't need it. \" He has been isolated in his room for most of the shift. Patient is calm, cooperative and compliant with the unit rules and protocols. Will remain on suicide precautions. Staff will continue to monitor q15 minutes for safety. Staff and nurses will continue to provide a safe and supportive environment.

## 2018-04-02 NOTE — PROGRESS NOTES
Patient approached nurse station and requested prn medication for \"real bad voices. \"  Patient did not elaborate about the voices, but stated that he feels \"real scared. \" Reality checks offered and patient assured patient that staff  maintains his safety and the safety of the unit. Patient also given Haldol 5 mg po and Ativan 1 mg po to assist with auditory hallucinations, patient then returned to his room and reclined on bed; will monitor and maintain safe environment.

## 2018-04-02 NOTE — H&P
Psychiatry History and Physical    Subjective:     Date of Evaluation:  4/2/2018    Reason for Referral:  Angela Garcia was referred to the examiners from ER/[facility] for SI/HI. History of Presenting Problem: 21Y/O C male in NAD well developed and nourished. Reports he is still having AH but denies AH. Medical problems; Depression, Schizoaffective, Bipolar, Left knee Tendonitis. Patient Active Problem List    Diagnosis Date Noted    Schizoaffective disorder ([facility] [address] 04/01/2018     Past Medical History:   Diagnosis Date    Bipolar 1 disorder ([facility] [address]     Intermittent explosive disorder     Psychiatric disorder     schitzophrenic     History reviewed. No pertinent surgical history. History reviewed. No pertinent family history. Social History   Substance Use Topics    Smoking status: Current Every Day Smoker    Smokeless tobacco: Never Used    Alcohol use Yes     Prior to Admission medications    Medication Sig Start Date End Date Taking? Authorizing Provider   risperiDONE (RISPERDAL) 4 mg tablet Take 4 mg by mouth two (2) times a day. Ruth Wolf MD   lithium carbonate 300 mg capsule Take 300 mg by mouth two (2) times daily (with meals). Ruth Wolf MD   benztropine (COGENTIN) 0.5 mg tablet Take 1 mg by mouth two (2) times a day.     Ruth Wolf MD     No Known Allergies     Review of Systems - History obtained from chart review and the patient  General ROS: positive for  - sleep disturbance  Psychological ROS: positive for - depression, hallucinations and sleep disturbances  Respiratory ROS: no cough, shortness of breath, or wheezing  Cardiovascular ROS: no chest pain or dyspnea on exertion  Gastrointestinal ROS: no abdominal pain, change in bowel habits, or black or bloody stools  Genito-Urinary ROS: no dysuria, trouble voiding, or hematuria  Musculoskeletal ROS: left knee pain  Neurological ROS: no TIA or stroke symptoms  Dermatological ROS: negative      Objective:     Patient Vitals for the past 8 hrs: BP Temp Pulse Resp Height Weight   04/02/18 0005 - - - - 5' 11\" (1.803 m) 81.6 kg (180 lb)   04/01/18 2357 121/66 98 °F (36.7 °C) 86 18 - -       Mental Status exam: WNL except for    Sensorium  oriented to time, place and person   Orientation person, place, time/date, situation and day of week   Relations cooperative   Eye Contact appropriate   Appearance:  age appropriate and within normal Limits   Motor Behavior:  gait unsteady and ambulates with limp   Speech:  normal volume   Vocabulary average   Thought Process: logical   Thought Content free of delusions and hallucinations   Suicidal ideations no plan  and no intention   Homicidal ideations no plan  and no intention   Mood:  depressed   Affect:  depressed   Memory recent  adequate   Memory remote:  adequate   Concentration:  adequate   Abstraction:  concrete   Insight:  good   Reliability good   Judgment:  good         Physical Exam:   Visit Vitals    /66 (BP 1 Location: Left arm, BP Patient Position: Sitting)    Pulse 86    Temp 98 °F (36.7 °C)    Resp 18    Ht 5' 11\" (1.803 m)    Wt 81.6 kg (180 lb)    BMI 25.1 kg/m2     General appearance: alert, cooperative, no distress, appears stated age  Head: Normocephalic, without obvious abnormality, atraumatic  Eyes: negative  Ears: normal TM's and external ear canals AU  Nose: Nares normal. Septum midline. Mucosa normal. No drainage or sinus tenderness. Throat: Lips, mucosa, and tongue normal. Teeth and gums normal  Neck: supple, symmetrical, trachea midline, no adenopathy, thyroid: not enlarged, symmetric, no tenderness/mass/nodules, no carotid bruit and no JVD  Back: symmetric, no curvature. ROM normal. No CVA tenderness. Lungs: clear to auscultation bilaterally  Chest wall: no tenderness  Heart: regular rate and rhythm, S1, S2 normal, no murmur, click, rub or gallop  Abdomen: soft, non-tender.  Bowel sounds normal. No masses,  no organomegaly  Extremities: extremities normal, atraumatic, no cyanosis or edema  Pulses: 2+ and symmetric  Skin: Skin color, texture, turgor normal. No rashes or lesions  Lymph nodes: Cervical, supraclavicular, and axillary nodes normal.  Neurologic: Grossly normal        Impression:      Principal Problem:    Schizoaffective disorder ([facility] [address] (4/1/2018)          Plan:     Recommendations for Treatment/Conditions:  Psychiatric treatment recommended while in hospital  Admit to Psych services for SI/AH                                                  Schizoaffective disorder                                                  Bipolar    Referral To: Inpatient psychiatric care    Competency Statement: At the current time, the patient is competent to make informed consent regarding their current medical care and discharge planning and/or financial decisions.       [employer], [place]   4/2/2018 7:48 AM

## 2018-04-02 NOTE — BH NOTES
GROUP THERAPY PROGRESS NOTE    Anne-Marie Hamilton is participating in [place].      Group time: 30 minutes    Personal goal for participation: discuss guideline compliance, unit issues and community announcements; verify insight and reality orientation    Goal orientation: community    Group therapy participation: passive

## 2018-04-02 NOTE — PROGRESS NOTES
Problem: Suicide/Homicide (Adult/Pediatric)  Goal: *STG: Remains safe in hospital  Pt will verbally contract for safety daily while hospitalized. Outcome: Progressing Towards Goal  No unsafe behaviors  Goal: *STG/LTG:  No longer expresses self destructive or suicidal/homicidal thoughts  Patient will no longer express suicidal/homicidal ideation prior to discharge with staff. Outcome: Progressing Towards Goal  Denies SI/HI    Problem: Falls - Risk of  Goal: *Absence of Falls  Patient will remain fall free during hospital stay  ----     Document Sharlene Booty Fall Risk and appropriate interventions in the flowsheet.     Outcome: Progressing Towards Goal  Fall Risk Interventions:  Medication Interventions: Assess postural VS orthostatic hypotension, Evaluate medications/consider consulting pharmacy, Teach patient to arise slowly  No falls reported/observed

## 2018-04-02 NOTE — BH NOTES
Patient admitted to [facility], report given to [id number] from [facility] [employer] . Patient was cooperative but anxious and fearful. Stated he was afraid of the voices that tell him to hurt others. Patient was given reassurance and he had prn medication available. Patient is to sleep in room 101 tonight so he will not have a roommate. Informed patient if he need to use the bathroom to let staff know since the shower is not working and bathroom door is locked   Patient states he has command hallucinations to hurt others and self and visual hallucinations -sees a demon. Has been feeling suicidal at times with plan to nellie his head in a wall or jump off a bridge. Patient was given a meal -states he was hungry. After talking with patient ,he stated he feels better and now able to verbally contract for safety. Reports having chronic left knee pain for about a year and states it is tendonitis . Xray of left knee was done in ER. Patient does walk with a limp and placed on fall precautions. Denies having pain at present -states he was given motrin and tylenol in ER.

## 2018-04-02 NOTE — BH NOTES
Patient spent did not display any negative or aggressive behavior today, he spent most of his day in bed resting, he got up ate and returned to bed, staff will continue to monitor Patient for safety.

## 2018-04-02 NOTE — H&P
[address]  Inpatient Admission Note    Date of Service:  04/02/18    Historian(s): Manish Adams and chart review  Referral Source: [facility]    Chief Complaint   Suicidal ideation, auditory/visual hallucinations    History of Present Illness     Manish Adams is a 20 y.o.  male with a history of schizoaffective disorder and intermittent explosive disorder who presented voluntarily for inpatient psychiatric hospitalization after reporting suicidal ideation, visual hallucinations of demons and threatening to nellie his head against a wall. Documentation reported that Mr. John Paul Lopez has made insults to health care workers. He reported left knee pain at [facility], left knee xray was unremarkable per report. On initial assessment, Mr. John Paul Lopez reported that he had a \"schizophrenia attack\" which he described as a black out episode where he has a violent outburst which includes verbal and physical aggression. These episodes occur daily and last for 5-10 minutes. Last episode occurred last night while patient was in room. He described these episodes as \"screaming in my head. \" He sees demons, ghosts and monsters every daily which cause him some distress. He also experiences command auditory hallucinations that say \"fuck you and run. \"       Mr. John Paul Lopez currently reports non-specific homicidal ideation. Denies suicidal ideation. He reports non-compliance on his outpatient regimen of risperidone and lithium for the past 6 months. Psychiatric Review of Systems   Depression:  Denied depressed mood    Anxiety: Denied any excessive worrying or panic attacks    Irritability: denied    Bipolar symptoms: hx unprovoked mood swings, see HPI for details. Psychosis: see HPI    Medical Review of Systems     Sleep: fair   Appetite: fair    10 point review of systems was completed. Significant findings are found in the HPI or MSE.       Psychiatric Treatment History     Self-injurious behavior/risky thoughts or behaviors (past suicidal ideation/attempt): Denies any prior history of thoughts of self-harm or suicidal actions. Violence/Risk to others (past homicidal ideation/attempt): hx physical aggression towards peers    Previous psychiatric medication trials: risperidone    Previous psychiatric hospitalizations: last hospitalized 6 months ago at [facility]    Current therapist: Noemy Kam    Current psychiatric provider: Noemy Kam    Allergies    No Known Allergies    Medical History     Past Medical History:   Diagnosis Date    Bipolar 1 disorder ([facility]     Intermittent explosive disorder     Psychiatric disorder     schitzophrenic       History of neurological illness: none  History of head injuries: none     Medication(s)     Prior to Admission Medications   Prescriptions Last Dose Informant Patient Reported? Taking?   benztropine (COGENTIN) 0.5 mg tablet   Yes No   Sig: Take 1 mg by mouth two (2) times a day. lithium carbonate 300 mg capsule   Yes No   Sig: Take 300 mg by mouth two (2) times daily (with meals). risperiDONE (RISPERDAL) 4 mg tablet   Yes No   Sig: Take 4 mg by mouth two (2) times a day. Facility-Administered Medications: None         Substance Abuse History     Tobacco: smokes 4 packs per day  Alcohol: last used 2.5 years ago  Marijuana: lasted used yesterday, prior to yesterday used 1 year ago  Cocaine: last used 4.5 months ago  Opiate: denied  Benzodiazepine: denied  Other: denied    Consequences: none    History of detox: none    History of substance abuse treatment: none    Family History     Denied    Family history of suicide? NO    Social History     Living Situation: reports homelessness in [place]. Born and raised in [place] [address].     Employment: disability      Relationships/Children: mother lives in [place], kicked out of her home 2 years ago    Vitals/Labs      Vitals:    04/01/18 2357 04/02/18 0005 04/02/18 0730   BP: 121/66  128/67   Pulse: 86 87   Resp: 18  18   Temp: 98 °F (36.7 °C)  97.8 °F (36.6 °C)   Weight:  81.6 kg (180 lb)    Height:  5' 11\" (1.803 m)        Labs:   Results for orders placed or performed during the hospital encounter of 04/01/18   HEPATIC FUNCTION PANEL   Result Value Ref Range    Protein, total 6.1 (L) 6.4 - 8.2 g/dL    Albumin 3.5 3.4 - 5.0 g/dL    Globulin 2.6 2.0 - 4.0 g/dL    A-G Ratio 1.3 0.8 - 1.7      Bilirubin, total 0.3 0.2 - 1.0 MG/DL    Bilirubin, direct 0.1 0.0 - 0.2 MG/DL    Alk. phosphatase 78 45 - 117 U/L    AST (SGOT) 16 15 - 37 U/L    ALT (SGPT) 36 16 - 61 U/L   TSH 3RD GENERATION   Result Value Ref Range    TSH 0.74 0.36 - 3.74 uIU/mL   VITAMIN B12 & FOLATE   Result Value Ref Range    Vitamin B12 421 211 - 911 pg/mL    Folate 11.4 3.10 - 17.50 ng/mL       Mental Status Examination     Appearance/Hygiene 21 yo CM  Fair hygiene      Behavior/Social Relatedness Odd,    Musculoskeletal Gait/Station: appropriate  Tone (flaccid, cogwheeling, spastic): not assessed  Psychomotor (hyperkinetic, hypokinetic): increased   Involuntary movements (tics, dyskinesias, akathisa, stereotypies): jerky movements. Speech   Rate, rhythm, volume, fluency and articulation are appropriate   Mood   restricted   Affect    restricted   Thought Process Linear and goal directed   Thought Content and Perceptual Disturbances AVH  Non-specific HI  Denies SI     Sensorium and Cognition  Grossly intact   Insight  fair   Judgment fair       Suicide Risk Assessment     Admission  Date/Time: 04/02/18    [x] Admission  [] Discharge     Key Factors:   Current admission precipitated by suicide attempt?   []  Yes     2    [x]  No     1     Suicide Attempt History  [] Past attempts of high lethality    2 []  Past attempts of low lethality    1 [x]  No previous attempts       0   Suicidal Ideation []  Constant suicidal thoughts      2 []  Intermittent or fleeting suicidal  thoughts  1 [x]  Denies current suicidal thoughts    0   Suicide Plan   []  Has plan with actual OR potential access to planned method    2 []  Has plan without access to planned method      1 [x]  No plan            0   Plan Lethality []  Highly lethal plan (Carbon monoxide, gun, hanging, jumping)    2 []  Moderate lethality of plan          1 [x]  Low lethality of plan (biting, head banging, superficial scratching, pillow over face)  0   Safety Plan Agreement  []  Unwilling OR unable to agree due to impaired reality testing   2   []  Patient is ambivalent and/or guarded      1 [x]  Reliably agrees        0   Current Morbid Thoughts (reunion fantasies, preoccupations with death) []  Constantly     2     []  Frequently    1 [x]  Rarely    0   Elopement Risk  []  High risk     2 []  Moderate risk    1 [x]   Low risk    0   Symptoms    []  Hopeless  []  Helpless  []  Anhedonia   []  Guilt/shame  [x]  Anger/rage  []  Anxiety  []  Insomnia   [x]  Agitation   [x]  Impulsivity  []  5-6 symptoms present    2 [x]  3-4 symptoms present    1  []  0-2 symptoms present    0     Total Score: 2  --------------------------------------------------------------------------------------------------------------  Subjective Appraisal of Risk:  []  Patient replies not trustworthy: several non-verbal cues. [x]  Patient replies questionable: trustworthy: at least 1 non-verbal cue.  []  Patient replies appear trustworthy.     Protective measures (select all that apply):  []  Successful past responses to stress  []  Spiritual/Congregational beliefs  []  Capacity for reality testing  []  Positive therapeutic relationships  []  Social supports/connections  []  Positive coping skills  []  Frustration tolerance/optimism  []  Children or pets in the home  []  Sense of responsibility to family  []  Agrees to treatment plan and follow up    High Risk Diagnoses (select all that apply):  []  Depression/Bipolar Disorder  []  Dual Diagnosis  []  Cardiovascular Disease  [x]  Schizophrenia  []  Chronic Pain  []  Epilepsy  []  Cancer  [] Personality Disorder  []  HIV/AIDS  []  Multiple Sclerosis    Dangerousness Assessment (Suicide, homicide, property destruction. ..)    Risk Factors reviewed and risk assessed to be:  [] low  [] low-moderate  [] moderate   [] moderate-high  [x] high     Protection factors reviewed and risk assessed to be:  [] low  [] low-moderate  [] moderate   [] moderate-high  [x] high     Response to treatment and risk assessed to be:  [] low  [] low-moderate  [] moderate   [] moderate-high  [x] high     Support reviewed and risk assessed to be:  [] low  [] low-moderate  [] moderate   [] moderate-high  [x] high     Acceptance of Discharge and outpatient treatment reviewed and risk assessed to be:    [] low  [] low-moderate  [] moderate   [] moderate-high  [x] high   Overall risk assessed to be:  [] low  [] low-moderate  [] moderate   [] moderate-high  [] high       Assessment and Plan     Psychiatric Diagnoses:   Patient Active Problem List   Diagnosis Code    Schizoaffective disorder ([facility] F25.9       Medical Diagnoses: none    Psychosocial and contextual factors: medication non-compliance    Level of impairment/disability: severe Florestine Lehmann is a 20 y.o. who is currently requiring acute stabilization after reporting auditory/visual hallucinations and suicidal ideation with plan of ramming head into wall. Non-compliance on regimen is contributing to decompensated psychotic symptoms. 1. Admit to locked inpatient behavioral health unit. Start milieu, group, art and occupation therapy. 2. Start Risperdal 1mg BID and Lithium 300mg BID. Will check TSH, LI, Cr during admission. 3. Routine labs ordered and reviewed by this provider. 4. Reviewed instructions, risks, benefits and side effects. 5. Start disposition planning; verify upcoming outpatient appointments with therapist and/or psychiatric medication prescriber.    6. Tentative date of discharge: 3-5 days       Leandra Collins MD  [address] [facility]

## 2018-04-02 NOTE — BSMART NOTE
Pt. Is a 20year old male with history of Schizoaffective Disorder. Pt. Was admitted due to having command hallucinations to harm self and others. SW discussed case with the treating psychiatrist.  Debe Kocher met with pt to discuss the reason for this admission. Pt expressed he was having a schizophrenia attack. Pt. Carlos Fuchs he has been off medications for several months. Pt stated he has sleep disturbance and has been hearing voices. Pt stated the  voices are telling him to harm self and others. Pt. Also admits to having auditory hallucinations. Pt. Stated he has been seeing ghost and demons. Pt stated his mother thought he relapsed on alcohol and kicked him out. Pt stated he needs medications. SW discussed the importance of medication and treatment compliance. SW addressed pt.'s homelessness. .  Pt. Declined shelter and board and care provider services. Pt. Appeared disheveled, disorganized and anxious. SW will continue to encourage medication compliance. Pt stated he receive oupt mental health services with dr. Beverly Pham at [facility].

## 2018-04-03 LAB
CHOLEST SERPL-MCNC: 127 MG/DL
EST. AVERAGE GLUCOSE BLD GHB EST-MCNC: 108 MG/DL
HBA1C MFR BLD: 5.4 % (ref 4.2–5.6)
HDLC SERPL-MCNC: 30 MG/DL (ref 40–60)
HDLC SERPL: 4.2 {RATIO} (ref 0–5)
LDLC SERPL CALC-MCNC: 78.2 MG/DL (ref 0–100)
LIPID PROFILE,FLP: ABNORMAL
TRIGL SERPL-MCNC: 94 MG/DL (ref ?–150)
VLDLC SERPL CALC-MCNC: 18.8 MG/DL

## 2018-04-03 PROCEDURE — 74011250637 HC RX REV CODE- 250/637: Performed by: PSYCHIATRY & NEUROLOGY

## 2018-04-03 PROCEDURE — 83036 HEMOGLOBIN GLYCOSYLATED A1C: CPT | Performed by: PSYCHIATRY & NEUROLOGY

## 2018-04-03 PROCEDURE — 65220000003 HC RM SEMIPRIVATE PSYCH

## 2018-04-03 PROCEDURE — 80061 LIPID PANEL: CPT | Performed by: PSYCHIATRY & NEUROLOGY

## 2018-04-03 PROCEDURE — 36415 COLL VENOUS BLD VENIPUNCTURE: CPT | Performed by: PSYCHIATRY & NEUROLOGY

## 2018-04-03 RX ORDER — ACETAMINOPHEN 325 MG/1
650 TABLET ORAL
Status: DISCONTINUED | OUTPATIENT
Start: 2018-04-03 | End: 2018-04-05 | Stop reason: HOSPADM

## 2018-04-03 RX ADMIN — RISPERIDONE 1 MG: 1 TABLET ORAL at 08:07

## 2018-04-03 RX ADMIN — ACETAMINOPHEN 650 MG: 325 TABLET ORAL at 18:55

## 2018-04-03 RX ADMIN — TRAZODONE HYDROCHLORIDE 100 MG: 100 TABLET ORAL at 20:19

## 2018-04-03 RX ADMIN — RISPERIDONE 1 MG: 1 TABLET ORAL at 20:19

## 2018-04-03 NOTE — BSMART NOTE
Pt. Is a 20year old male with history of Schizoaffective Disorder. Pt. Was admitted due to having command hallucinations to harm self and others.     SW discussed case with staff    SW Contact:  SW met with pt. Pt expressed he was starting to feel better. Pt stated he is no longer hearing voices. Pt stated the medication works. Pt. Stated he was able to get sleep and appetite good. Pt. Is denying ideations and hallucinations. Pt. continues to declined assistance with housing. Pt stated he prefers to live homeless. .  Pt. Appeared disheveled, disorganized and anxious. SW will continue to encourage medication compliance      D/C Plan:  Pt stated he receive oupt mental health services with dr. Faina Parmar at [facility].

## 2018-04-03 NOTE — BH NOTES
Patient very drowsy and unsteady on feet. Encouraged patient to have a seat to prevent falls. Patient stated, \"I thought it was latter than this. I feel much better than yesterday. I was scared. \"  Patient drank water and expressed that he has been clean for 5 months from crack. Patient reported that he was smoking crack since age 9years old due to peer pressure. Patient went back to room. Will continue to monitor and provide support as needed.

## 2018-04-03 NOTE — BSMART NOTE
SOCIAL WORK GROUP THERAPY PROGRESS NOTE    Group Time:  10am    Group Topic:  Coping Skills    Group Participation:     Pt was unavailable for group due to personal hygiene & meeting with his TOSHA WADSWORTH

## 2018-04-03 NOTE — PROGRESS NOTES
[address] [facility] [address]  Inpatient Progress Note     Date of Service: 04/03/18  Hospital Day: 2     Subjective/Interval History   04/03/18    Treatment Team Notes:  Notes reviewed and/or discussed and report that Gloria Bryant received Haldol 5mg and Ativan 1mg after reporting he felt unsafe. Told staff this morning that he slept well and feels better. He si compliant with medications. Patient interview: Gloria Bryant was interviewed by this writer today. Pt requested discharge on Thursday. Says he slept well last night. Stated \"I was telling everyone I just needed to get back on my medications! \" Denies any adverse effects from risperidone 1mg BID and Lithium 300mg BID. No EPS or tremors. Denies hallucinations. No aggressive thoughts/behaviors. Denies HI.        Objective     Visit Vitals    /65 (BP 1 Location: Right arm, BP Patient Position: Sitting)    Pulse 87    Temp 98.3 °F (36.8 °C)    Resp 16    Ht 5' 11\" (1.803 m)    Wt 81.6 kg (180 lb)    BMI 25.1 kg/m2       Mental Status Examination     Appearance/Hygiene 19 yo CM  Good hygiene  Appropriate wearing scrubs     Behavior/Social Relatedness Relates well   Musculoskeletal Gait/Station: appropriate  Tone (flaccid, cogwheeling, spastic): good  Psychomotor (hyperkinetic, hypokinetic): appropriate   Involuntary movements (tics, dyskinesias, akathisa, stereotypies): none   Speech   Rate, rhythm, volume, fluency and articulation are appropriate   Mood   \"great\"   Affect    stable   Thought Process Linear and goal directed   Thought Content and Perceptual Disturbances Denies self-injurious behavior (SIB), suicidal ideation (SI), aggressive behavior or homicidal ideation (HI)    Denies auditory and visual hallucinations   Sensorium and Cognition  Grossly intact   Insight  fair   Judgment good        Assessment/Plan      Psychiatric Diagnoses:   Patient Active Problem List   Diagnosis Code    Schizoaffective disorder (HCC) F25.9 Medical Diagnoses: none     Psychosocial and contextual factors: medication non-compliance     Level of impairment/disability: moderate     Yulia Mcclure is a 20 y.o. who is currently stabilizing; he denies interview suicidal or homicidal ideation. Does not relate in a thought disordered manner. Interacts younger than stated age.      1. Continue Risperdal 1mg BID and Lithium 300mg BID. 2. Check Li Thursday morning  3. Continue disposition planning; verify upcoming outpatient appointments with therapist and/or psychiatric medication prescriber.    4. Tentative date of discharge: Thursday    MD DR. TAMANNA Garcia'S \[facility] [place] [facility]\""  Psychiatry

## 2018-04-03 NOTE — PROGRESS NOTES
Problem: Altered Thought Process (Adult/Pediatric)  Goal: *STG: Complies with medication therapy  Will take scheduled medications daily as ordered during her hospital stay under direct supervision. Outcome: Progressing Towards Goal  Compliant. Goal: *STG: Attends activities and groups  Patient will attend at least 2 groups daily. Outcome: Progressing Towards Goal  Attending groups. Goal: *STG: Decreased hallucinations  Patient will be able to verbalize decreased hallucinations prior to discharge. Outcome: Progressing Towards Goal  Denies     Comments: Patient approached  staff  and stated \" I'm ready to go by Thursday because I'm not hearing any voices no more\". Patient is medication compliant. In his room for most of the morning voice no complaints.

## 2018-04-03 NOTE — BH NOTES
GROUP THERAPY PROGRESS NOTE    Randal Mcnamara is not participating in [place].      Group time: 30 minutes    Personal goal for participation: none    Goal orientation: goals setting    Group therapy participation: refused    Therapeutic interventions reviewed and discussed: goals and procedures    Impression of participation: encouraged

## 2018-04-03 NOTE — BH NOTES
GROUP THERAPY PROGRESS NOTE    Fernando Godinez is participating in [place]. Group time: 30 minutes    Personal goal for participation: rules/ regulations    Goal orientation: community    Group therapy participation: pt refused    Therapeutic interventions reviewed and discussed: Pt refused group with much encouragement by staff.     Impression of participation: pt refused

## 2018-04-04 PROCEDURE — 65220000003 HC RM SEMIPRIVATE PSYCH

## 2018-04-04 PROCEDURE — 74011250637 HC RX REV CODE- 250/637: Performed by: PSYCHIATRY & NEUROLOGY

## 2018-04-04 RX ADMIN — ACETAMINOPHEN 650 MG: 325 TABLET ORAL at 17:00

## 2018-04-04 RX ADMIN — LORAZEPAM 1 MG: 1 TABLET ORAL at 13:21

## 2018-04-04 RX ADMIN — RISPERIDONE 1 MG: 1 TABLET ORAL at 08:03

## 2018-04-04 RX ADMIN — TRAZODONE HYDROCHLORIDE 100 MG: 100 TABLET ORAL at 20:38

## 2018-04-04 RX ADMIN — RISPERIDONE 1 MG: 1 TABLET ORAL at 20:38

## 2018-04-04 NOTE — BSMART NOTE
ART THERAPY GROUP PROGRESS NOTE    PATIENT SCHEDULED FOR GROUP AT: 14:15    ATTENDANCE: 1/2    PARTICIPATION LEVEL: Participates fully in the art process    ATTENTION LEVEL: Able to focus on task    FOCUS: Creative expression    SYMBOLIC & THEMATIC CONTENT AS NOTED IN IMAGERY: He presented with an elevated mood and bright affect. His overall behavior pleasant but child-like at times and he displays poor social skills. His developmental drawing level is significantly delayed for his age.

## 2018-04-04 NOTE — BH NOTES
Treatment team met -     Medical Director: _____present   Psychiatrist: _x____present   Charge nurse: _x____present   MSW: __x___present   : _x____present   Nurse Manager: _____present   Student RNs: _____present   Medical Students: _____present   Art Therapist: _x____present   Clinical Coordinator: __x___present   Internal : _x__present   Occupational Therapist: __x___present   : ___x____ present  Crisis Supervisor x  present  UR  x  present    Plan of care discussed and updated as appropriate.

## 2018-04-04 NOTE — BH NOTES
Patient requested and received Tylenol 650 mg po prn for complaints of a \"bad\" headache. Patient voiced plans about going to his appointments and \"speaking with my therapist when I leave\". Patient voiced excitement about leaving tomorrow. Will monitor for effectiveness of PRN.

## 2018-04-04 NOTE — BSMART NOTE
OCCUPATIONAL THERAPY PROGRESS NOTE  Group Time:  2802  Attendance: The patient attended 1/4 of group. Attention:  The patient needed redirection to activity at least once. Interaction:  The patient occasionally  interacts with others. Came out for last 10 minutes of group and participated as able as activity almost complete. Answered questions.   Mood seemed bright,

## 2018-04-04 NOTE — BSMART NOTE
Pt. Is a 20year old male with history of Schizoaffective Disorder.  Pt. Was admitted due to having command hallucinations to harm self and others.      Pt.'s case was discussed in treatment team this a.m      ANNA Contact:  SW met with pt. To discuss d/c planning. Pt expressed he feels better today. Pt denies ideations and hallucinations. Pt stated he is ready for d/c.  SW dicussed continued medication and  treatment compliance the community. Pt. Plans  to follow-up aftercare  with Dr. Yamila Hinton at [facility]. Pt. Has an appointment 4/24/18 @10:30 a.m. Pt. Declined help with housing. Pt. Is asking to be cab to [address] [place] [address] . Pt. Was cooperative and goal oriented.

## 2018-04-04 NOTE — BH NOTES
GROUP THERAPY PROGRESS NOTE    Ruben Mobley is participating in Recreational Therapy.      Group time: 45 minutes    Personal goal for participation: educational and relaxation     Goal orientation: relaxation    Group therapy participation: active    Therapeutic interventions reviewed and discussed: Patient watched educational program    Impression of participation: Happy

## 2018-04-04 NOTE — PROGRESS NOTES
[address]  Inpatient Progress Note     Date of Service: 04/04/18  Hospital Day: 3     Subjective/Interval History   04/04/18    Treatment Team Notes:  Notes reviewed and/or discussed and report that [address] for anxiety. Positive behaviors. Patient interview: Mayco Marcos was interviewed by this writer today. Denied interval concerns. Says medications are helping with sleep. Feels hopeful and excited. Denied any hallucinations or suicidal ideation. Specifically denied any thoughts of ramming his head in the wall. Denied aggressive thoughts towards others as well. No tremor or EPS. Denied other side effects.        Objective     Visit Vitals    /74 (BP 1 Location: Right arm, BP Patient Position: At rest)    Pulse 100    Temp 97.1 °F (36.2 °C)    Resp 18    Ht 5' 11\" (1.803 m)    Wt 81.6 kg (180 lb)    BMI 25.1 kg/m2       Mental Status Examination     Appearance/Hygiene 21 yo CM  Good hygiene  Appropriate wearing scrubs     Behavior/Social Relatedness Relates well   Musculoskeletal Gait/Station: appropriate  Tone (flaccid, cogwheeling, spastic): good  Psychomotor (hyperkinetic, hypokinetic): appropriate   Involuntary movements (tics, dyskinesias, akathisa, stereotypies): none   Speech   Rate, rhythm, volume, fluency and articulation are appropriate   Mood   \"great\"   Affect    stable   Thought Process Linear and goal directed   Thought Content and Perceptual Disturbances Denies self-injurious behavior (SIB), suicidal ideation (SI), aggressive behavior or homicidal ideation (HI)    Denies auditory and visual hallucinations   Sensorium and Cognition  Grossly intact   Insight  fair   Judgment good        Assessment/Plan      Psychiatric Diagnoses:   Patient Active Problem List   Diagnosis Code    Schizoaffective disorder ([facility] F25.9     Medical Diagnoses: none     Psychosocial and contextual factors: medication non-compliance     Level of impairment/disability: cat Charlton is a 20 y.o. who is currently stabilizing; he denies interview suicidal or homicidal ideation. Does not relate in a thought disordered manner. Interacts younger than stated age.      1. Continue Risperdal 1mg BID and Lithium 300mg BID. 2. Check Li Thursday morning  3. Continue disposition planning; verify upcoming outpatient appointments with therapist and/or psychiatric medication prescriber.    4. Tentative date of discharge: Thursday    Airam Dobbs MD  [facility]  Psychiatry

## 2018-04-04 NOTE — BH NOTES
Pt was sociable and cooperative with staff throughout this shift. Pt expressed that he was looking forward to being discharge soon and can't wait to have a cigarette. No explosive behavior observed during this shift. Staff will continue to monitor for safety and well being.

## 2018-04-04 NOTE — PROGRESS NOTES
Problem: Altered Thought Process (Adult/Pediatric)  Goal: *STG: Complies with medication therapy  Will take scheduled medications daily as ordered during her hospital stay under direct supervision. Outcome: Progressing Towards Goal  complaint  Goal: *STG: Attends activities and groups  Patient will attend at least 2 groups daily. Outcome: Progressing Towards Goal  Attend group  Goal: *STG: Decreased hallucinations  Patient will be able to verbalize decreased hallucinations prior to discharge. Outcome: Progressing Towards Goal  Denies     Comments: Patient denies hallucinations. Patient  states he slept well last night. Denies suicidal ideations.

## 2018-04-04 NOTE — BH NOTES
GROUP THERAPY PROGRESS NOTE    Fernando Godinez is not participating in [place]. Group time: 30 minutes    Personal goal for participation: rules/ regulations    Goal orientation: community    Group therapy participation: pt refused    Therapeutic interventions reviewed and discussed: Pt refused group with much encouragement by staff.     Impression of participation: ptrefused

## 2018-04-04 NOTE — PROGRESS NOTES
Patient approached nurse, request medication for anxiety. Ativan 1 mg given for complaints. Also demonstrated slow deep breathing exercises.

## 2018-04-05 VITALS
BODY MASS INDEX: 25.2 KG/M2 | RESPIRATION RATE: 17 BRPM | HEIGHT: 71 IN | TEMPERATURE: 97.4 F | DIASTOLIC BLOOD PRESSURE: 73 MMHG | WEIGHT: 180 LBS | HEART RATE: 86 BPM | SYSTOLIC BLOOD PRESSURE: 115 MMHG

## 2018-04-05 LAB — LITHIUM SERPL-SCNC: <0.2 MMOL/L (ref 0.6–1.2)

## 2018-04-05 PROCEDURE — 80178 ASSAY OF LITHIUM: CPT | Performed by: PSYCHIATRY & NEUROLOGY

## 2018-04-05 PROCEDURE — 36415 COLL VENOUS BLD VENIPUNCTURE: CPT | Performed by: PSYCHIATRY & NEUROLOGY

## 2018-04-05 PROCEDURE — 74011250637 HC RX REV CODE- 250/637: Performed by: PSYCHIATRY & NEUROLOGY

## 2018-04-05 RX ORDER — RISPERIDONE 1 MG/1
1 TABLET, FILM COATED ORAL 2 TIMES DAILY
Qty: 60 TAB | Refills: 0 | Status: SHIPPED | OUTPATIENT
Start: 2018-04-05 | End: 2019-08-26

## 2018-04-05 RX ADMIN — ACETAMINOPHEN 650 MG: 325 TABLET ORAL at 09:24

## 2018-04-05 RX ADMIN — RISPERIDONE 1 MG: 1 TABLET ORAL at 08:08

## 2018-04-05 NOTE — BH NOTES
GROUP THERAPY PROGRESS NOTE    Rubi Hannah is participating in [place].      Group time: 30 minutes    Personal goal for participation: go home    Goal orientation: community    Group therapy participation: active    Therapeutic interventions reviewed and discussed: goals and procedures    Impression of participation: encouraged

## 2018-04-05 NOTE — DISCHARGE INSTRUCTIONS
BEHAVIORAL HEALTH NURSING DISCHARGE NOTE      The following personal items collected during your admission are returned to you:   Dental Appliance: Dental Appliances: None  Vision: Visual Aid: None  Hearing Aid:    Jewelry: Jewelry: None  Clothing: Clothing: Pants, Shirt  Other Valuables: Other Valuables: Cell Phone, Cigarettes, Money (comment), Wallet (he had 1.93 cash cell phone )  Valuables sent to safe:        PATIENT INSTRUCTIONS:         The discharge information has been reviewed with the patient. The patient verbalized understanding.         Patient armband removed and shredded

## 2018-04-05 NOTE — DISCHARGE SUMMARY
[facility]  Inpatient Psychiatry   Discharge Summary     Admit date: 4/1/2018    Discharge date and time: 4/5/2018 11:09 AM    Discharge Physician: Danica Calderon MD    DISCHARGE DIAGNOSES     Psychiatric Diagnoses:        Patient Active Problem List   Diagnosis Code    [facility] ([facility] F25.9   Rule out intellectual disability     Medical Diagnoses: none      Psychosocial and contextual factors: medication non-compliance      Level of impairment/disability: mild      HOSPITAL COURSE     Jessy Emery is a 20 y.o.  male with a history of schizoaffective disorder and intermittent explosive disorder who presented voluntarily for inpatient psychiatric hospitalization after reporting suicidal ideation, visual hallucinations of demons and threatening to nellie his head against a wall. Documentation reported that Mr. Kandy Mckeon has made insults to health care workers. He reported left knee pain at [facility], left knee xray was unremarkable per report.      On initial assessment, Mr. Kandy Mckeon reported that he had a \"schizophrenia attack\" which he described as a black out episode where he has a violent outburst which includes verbal and physical aggression. These episodes occur daily and last for 5-10 minutes. Last episode occurred last night while patient was in room. He described these episodes as \"screaming in my head. \" He sees demons, ghosts and monsters every daily which cause him some distress. He also experiences command auditory hallucinations that say \"fuck you and run. \"        Mr. Kandy Mckeon currently reports non-specific homicidal ideation. Denies suicidal ideation. He reports non-compliance on his outpatient regimen of risperidone and lithium for the past 6 months. During his admission, Mr. Kandy Mckeon was restarted on risperidone for management of mood dysregulation and hallucination.  After the first day of resuming risperidone, Mr. Kandy Mckeon reported resolved thoughts of self-harm and stated that his sleep improved. He tolerated risperidone without any EPS. He expressed interest in outpatient followup. Behaviorally, he related younger than stated age but was non-aggressive.        DISPOSITION/FOLLOW-UP     Disposition: home    Follow-up Appointments:  Pt. Has an appointment scheduled with Dr. Justino Cristina  on 4/24/18 @ 10:30 a.m for medication management at [address], [address] [facility]  Phone: [phone]    Pt. Is asking to be cab to [address] [place] [address] . MEDICATION CHANGES   Outpatient medications:  No current facility-administered medications on file prior to encounter. No current outpatient prescriptions on file prior to encounter. Medications discontinued during hospitalization:  Medications Discontinued During This Encounter   Medication Reason    LORazepam (ATIVAN) injection 2 mg     LORazepam (ATIVAN) tablet 2 mg     benztropine (COGENTIN) tablet 1-2 mg     benztropine (COGENTIN) injection 1-2 mg     risperiDONE (RisperDAL) tablet 0.5 mg     risperiDONE (RISPERDAL) 4 mg tablet Discontinued at Discharge    lithium carbonate 300 mg capsule Discontinued at Discharge    benztropine (COGENTIN) 0.5 mg tablet Discontinued at Discharge         Discharged medication:  Discharge Medication List as of 4/5/2018 10:45 AM      CONTINUE these medications which have CHANGED    Details   risperiDONE (RISPERDAL) 1 mg tablet Take 1 Tab by mouth two (2) times a day. Indications: mood stabilization, Print, Disp-60 Tab, R-0         STOP taking these medications       lithium carbonate 300 mg capsule Comments:   Reason for Stopping:         benztropine (COGENTIN) 0.5 mg tablet Comments:   Reason for Stopping:               Instructions, risks (black box warning), benefits and side effects (EPS, TD, NMS) were discussed in detail prior to discharge. Patient denied any adverse medication side effects prior to discharge. LABS/IMAGING DURING ADMISSION     Results for orders placed or performed during the hospital encounter of 04/01/18   HEPATIC FUNCTION PANEL   Result Value Ref Range    Protein, total 6.1 (L) 6.4 - 8.2 g/dL    Albumin 3.5 3.4 - 5.0 g/dL    Globulin 2.6 2.0 - 4.0 g/dL    A-G Ratio 1.3 0.8 - 1.7      Bilirubin, total 0.3 0.2 - 1.0 MG/DL    Bilirubin, direct 0.1 0.0 - 0.2 MG/DL    Alk.  phosphatase 78 45 - 117 U/L    AST (SGOT) 16 15 - 37 U/L    ALT (SGPT) 36 16 - 61 U/L   TSH 3RD GENERATION   Result Value Ref Range    TSH 0.74 0.36 - 3.74 uIU/mL   VITAMIN B12 & FOLATE   Result Value Ref Range    Vitamin B12 421 211 - 911 pg/mL    Folate 11.4 3.10 - 17.50 ng/mL   LIPID PANEL   Result Value Ref Range    LIPID PROFILE          Cholesterol, total 127 <200 MG/DL    Triglyceride 94 <150 MG/DL    HDL Cholesterol 30 (L) 40 - 60 MG/DL    LDL, calculated 78.2 0 - 100 MG/DL    VLDL, calculated 18.8 MG/DL    CHOL/HDL Ratio 4.2 0 - 5.0     HEMOGLOBIN A1C WITH EAG   Result Value Ref Range    Hemoglobin A1c 5.4 4.2 - 5.6 %    Est. average glucose 108 mg/dL   LITHIUM   Result Value Ref Range    Lithium level <0.20 (L) 0.6 - 1.2 MMOL/L        DISCHARGE MENTAL STATUS EVALUATION     Appearance/Hygiene 21 yo CM  Good hygiene  Casually dressed   Behavior/Social Relatedness Relates younger than stated age   Musculoskeletal Gait/Station: appropriate  Tone (flaccid, cogwheeling, spastic): good upper extremity tone  Psychomotor (hyperkinetic, hypokinetic): appropriate   Involuntary movements (tics, dyskinesias, akathisa, stereotypies): none   Speech                          Rate, rhythm, volume, fluency and articulation are appropriate   Mood                          \"great\"   Affect                                                   stable   Thought Process [place], linear   Thought Content and Perceptual Disturbances Denies self-injurious behavior (SIB), suicidal ideation (SI), aggressive behavior or homicidal ideation (HI)     Denies auditory and visual hallucinations   Sensorium and Cognition              Grossly intact   Insight              fair   Judgment good          SUICIDE RISK ASSESSMENT     [] Admission  [x] Discharge     Key Factors:   Current admission precipitated by suicide attempt?   []  Yes     2    [x]  No     1     Suicide Attempt History  [] Past attempts of high lethality    2 []  Past attempts of low lethality    1 [x]  No previous attempts       0   Suicidal Ideation []  Constant suicidal thoughts      2 []  Intermittent or fleeting suicidal  thoughts  1 [x]  Denies current suicidal thoughts    0   Suicide Plan   []  Has plan with actual OR potential access to planned method    2 []  Has plan without access to planned method      1 [x]  No plan            0   Plan Lethality []  Highly lethal plan (Carbon monoxide, gun, hanging, jumping)    2 []  Moderate lethality of plan          1 [x]  Low lethality of plan (biting, head banging, superficial scratching, pillow over face)  0   Safety Plan Agreement  []  Unwilling OR unable to agree due to impaired reality testing   2   []  Patient is ambivalent and/or guarded      1 [x]  Reliably agrees        0   Current Morbid Thoughts (reunion fantasies, preoccupations with death) []  Constantly     2     []  Frequently    1 [x]  Rarely    0   Elopement Risk  []  High risk     2 []  Moderate risk    1 [x]   Low risk    0   Symptoms    []  Hopeless  []  Helpless  []  Anhedonia   []  Guilt/shame  []  Anger/rage  []  Anxiety  []  Insomnia   []  Agitation   []  Impulsivity  []  5-6 symptoms present    2 []  3-4 symptoms present    1  [x]  0-2 symptoms present    0     Scoring Key:  10 or higher = Imminent Risk (consider 1:1)  4 - 9 = Moderate Risk (consider q 15 minute observation)Attended alcohol, tobacco, prescription and other drug psychoeducation group.   0 - 3 = Low Risk (consider q 30 minute observation)    Total Score: 1  ------------------------------------------------------------------------------------------------------------------  PLEASE ADDRESS THE FOLLOWING 5 ISSUES     Physician's Subjective Appraisal of Risk (check one):  []  Patient replies not trustworthy: several non-verbal cues. []  Patient replies questionable: trustworthy: at least 1 non-verbal cue. [x]  Patient replies appear trustworthy. Family History of Suicide? []  Yes  [x]  No    Protective measures (select all that apply):  [x]  Successful past responses to stress  []  Spiritual/Mandaeism beliefs  [x]  Capacity for reality testing  []  Positive therapeutic relationships  [x]  Social supports/connections  [x]  Positive coping skills  []  Frustration tolerance/optimism  []  Children or pets in the home  []  Sense of responsibility to family  [x]  Agrees to treatment plan and follow up    Others (list):    High Risk Diagnoses (select all that apply):  []  Depression/Bipolar Disorder  []  Dual Diagnosis  []  Cardiovascular Disease  [x]  Schizophrenia  []  Chronic Pain  []  Epilepsy  []  Cancer  []  Personality Disorder  []  HIV/AIDS  []  Multiple Sclerosis    Dangerousness Assessment (Suicide, homicide, property destruction. ..)    Risk Factors reviewed and risk assessed to be:  [] low  [] low-moderate  [] moderate   [] moderate-high  [x] high     Protection factors reviewed and risk assessed to be:  [] low  [] low-moderate  [x] moderate   [] moderate-high  [] high     Response to treatment and risk assessed to be:  [x] low  [] low-moderate  [] moderate   [] moderate-high  [] high     Support reviewed and risk assessed to be:  [] low  [] low-moderate  [x] moderate   [] moderate-high  [] high     Acceptance of Discharge and outpatient treatment reviewed and risk assessed to be:    [] low  [x] low-moderate  [] moderate   [] moderate-high  [] high   Overall risk assessed to be:  [] low  [x] low-moderate  [] moderate   [] moderate-high  [] high     Completion of discharge was greater than 30 minutes. Over 50% of today's discharge was geared towards counseling and coordination of care.           Soraya Dennis MD  Psychiatry  [facility]

## 2018-04-06 LAB
25(OH)D2 SERPL-MCNC: <1 NG/ML
25(OH)D3 SERPL-MCNC: 24 NG/ML
25(OH)D3+25(OH)D2 SERPL-MCNC: 24 NG/ML

## 2018-05-07 ENCOUNTER — OFFICE VISIT (OUTPATIENT)
Dept: ORTHOPEDIC SURGERY | Age: 23
End: 2018-05-07

## 2018-05-07 VITALS
DIASTOLIC BLOOD PRESSURE: 62 MMHG | BODY MASS INDEX: 28.42 KG/M2 | TEMPERATURE: 97.8 F | RESPIRATION RATE: 15 BRPM | WEIGHT: 203 LBS | HEART RATE: 92 BPM | HEIGHT: 71 IN | SYSTOLIC BLOOD PRESSURE: 126 MMHG

## 2018-05-07 DIAGNOSIS — M23.92 INTERNAL DERANGEMENT OF LEFT KNEE: Primary | ICD-10-CM

## 2018-05-07 DIAGNOSIS — M25.462 EFFUSION OF LEFT KNEE: ICD-10-CM

## 2018-05-07 RX ORDER — NAPROXEN 500 MG/1
500 TABLET ORAL 2 TIMES DAILY WITH MEALS
Qty: 60 TAB | Refills: 0 | Status: SHIPPED | OUTPATIENT
Start: 2018-05-07 | End: 2019-08-26

## 2018-05-07 RX ORDER — NAPROXEN 500 MG/1
500 TABLET ORAL 2 TIMES DAILY WITH MEALS
COMMUNITY
End: 2018-05-07

## 2018-05-07 NOTE — PROGRESS NOTES
Pt had multiple falls in the past year. Pt states that he fell on a patch of ice in November 2017. Pt fell today in the office while walking to the exam room. No apparent injuries.

## 2018-05-07 NOTE — PROGRESS NOTES
HISTORY OF PRESENT ILLNESS    Claudia huerta a 20y.o. year old male comes in today as new patient for: left knee meniscal tear    Patients symptoms have been present for about 1 months. Pain level 10/10 medial and lateral joint line, It has slightly improved with naproxen from ER. Patient has tried:  rest and immobilizer. It is described as pain in knee after standing up at [employer]. + swelling. Was told meniscal tear by ER. Knee did buckle and he fell on way into the exam room but no new injury per patient. IMAGING: XR 4/1/18: no acute osseous abnormality involving the left knee per report. Social History     Social History    Marital status: SINGLE     Spouse name: N/A    Number of children: N/A    Years of education: N/A     Social History Main Topics    Smoking status: Current Every Day Smoker     Packs/day: 1.00     Years: 10.00    Smokeless tobacco: Never Used    Alcohol use No    Drug use: Yes     Special: Marijuana      Comment: history of abuse of crack    Sexual activity: Not Asked     Other Topics Concern    None     Social History Narrative     Current Outpatient Prescriptions   Medication Sig Dispense Refill    naproxen (NAPROSYN) 500 mg tablet Take 500 mg by mouth two (2) times daily (with meals).  risperiDONE (RISPERDAL) 1 mg tablet Take 1 Tab by mouth two (2) times a day. Indications: mood stabilization 60 Tab 0     Past Medical History:   Diagnosis Date    Bipolar 1 disorder ([address]     Intermittent explosive disorder     Psychiatric disorder     schitzophrenic    Schizophrenia, schizo-affective ([address]      Family History   Problem Relation Age of Onset    Diabetes Mother     Sleep Apnea Mother          ROS:  No numb. Has buckled many times. All other systems reviewed and negative aside from that written in the HPI.       Objective:  Visit Vitals    /62    Pulse 92    Temp 97.8 °F (36.6 °C)    Resp 15    Ht 5' 11\" (1.803 m)    Wt 203 lb (92.1 kg)    BMI 28.31 kg/m2     GEN: Appears stated age in NAD. HEAD:  Normocephalic, atraumatic. NEURO:  Sensation intact light touch B/L lower extremities. MS:  LE Strength +5/5 bilateral .   left Knee:  2+ Effusion . Lachman negative. Valgus negative. Varus negative. positive joint line tenderness medial.  Johnson positive. Posterior drawer negative. Noble compression test negative. Patellar apprehension negative. Patellar facet  positive tender to palpation medial + crepitance right. EXT: no clubbing/cyanosis. no edema. SKIN: Warm/dry without rash. HEENT: Conjunctiva/lids WNL. External canals/nares WNL. Tongue midline. PERRL, EOMI. Hearing intact. NECK: Trachea midline. Supple, Full ROM. No thyromegaly. CARDIAC: No edema. LUNGS: Normal effort. ABD: Soft, no masses. No HSM. PSYCH: A+O x3. Appropriate judgment and insight. Assessment/Plan:     ICD-10-CM ICD-9-CM    1. Internal derangement of left knee M23.92 717.9    2. Effusion of left knee M25.462 719.06      Orders Placed This Encounter    MRI KNEE LT WO CONT     Standing Status:   Future     Standing Expiration Date:   6/7/2019     Order Specific Question:   Is Patient Allergic to Contrast Dye? Answer:   Unknown     Order Specific Question:   Arthrogram study     Answer:   No    naproxen (NAPROSYN) 500 mg tablet     Sig: Take 1 Tab by mouth two (2) times daily (with meals). Dispense:  60 Tab     Refill:  0     Patient verbalizes understanding of evaluation and plan. Discussed fall on way to exam ROM and still pain only in knee at site for visit so no new concern. Will get MRI as testing suspicious for meniscal tear and naproxen Rx and use immobilizer but declined crutches but will give Rx for cane. .  Return after MRI for results.

## 2018-05-07 NOTE — MR AVS SNAPSHOT
[address], [address] [facility] [address] 
[phone] Patient: Shona Plasencia MRN: [id number] :1995 Visit Information Date & Time Provider Department Dept. Phone Encounter #  
 2018  1:40 PM Markel Schwartz, [facility] [phone] [id number] Follow-up Instructions Return for after MRI knee for results. Routing History Upcoming Health Maintenance Date Due Pneumococcal 19-64 Medium Risk (1 of 1 - PPSV23) 2013 DTaP/Tdap/Td series (1 - Tdap) 2015 Influenza Age 5 to Adult 2018 Allergies as of 2018  Review Complete On: 2018 By: Markel Schwartz DO No Known Allergies Current Immunizations  Never Reviewed No immunizations on file. Not reviewed this visit You Were Diagnosed With   
  
 Codes Comments Internal derangement of left knee    -  Primary ICD-10-CM: M23.92 
ICD-9-CM: 717.9 Effusion of left knee     ICD-10-CM: M25.462 ICD-9-CM: 719.06 Vitals BP Pulse Temp Resp Height(growth percentile) Weight(growth percentile) 126/62 92 97.8 °F (36.6 °C) 15 5' 11\" (1.803 m) 203 lb (92.1 kg) BMI Smoking Status 28.31 kg/m2 Current Every Day Smoker Vitals History BMI and BSA Data Body Mass Index Body Surface Area  
 28.31 kg/m 2 2.15 m 2 Your Updated Medication List  
  
   
This list is accurate as of 18  1:58 PM.  Always use your most recent med list.  
  
  
  
  
 naproxen 500 mg tablet Commonly known as:  NAPROSYN Take 1 Tab by mouth two (2) times daily (with meals). risperiDONE 1 mg tablet Commonly known as:  RisperDAL Take 1 Tab by mouth two (2) times a day. Indications: mood stabilization Prescriptions Printed Refills  
 naproxen (NAPROSYN) 500 mg tablet 0 Sig: Take 1 Tab by mouth two (2) times daily (with meals). Class: Print Route: Oral  
  
We Performed the Following AMB SUPPLY ORDER [[id number] Custom] Comments:  
 Emily Graham Dx: Internal derangement of left knee  (primary encounter diagnosis) Effusion of left knee Follow-up Instructions Return for after MRI knee for results. To-Do List   
 05/07/2018 Imaging:  MRI KNEE LT WO CONT Introducing [facility]! Rubens Mitchell introduces [employer] patient portal. Now you can access parts of your medical record, email your doctor's office, and request medication refills online. 1. In your internet browser, go to [email or web address] 2. Click on the First Time User? Click Here link in the Sign In box. You will see the New Member Sign Up page. 3. Enter your [employer]yl[employer] Access Code exactly as it appears below. You will not need to use this code after youve completed the sign-up process. If you do not sign up before the expiration date, you must request a new code. · [employer]gyloot Access Code: [email or web address] Expires: 6/30/2018  7:17 PM 
 
4. Enter the last four digits of your Social Security Number (xxxx) and Date of Birth (mm/dd/yyyy) as indicated and click Submit. You will be taken to the next sign-up page. 5. Create a dog[employer]yloot ID. This will be your [employer]yl[employer] login ID and cannot be changed, so think of one that is secure and easy to remember. 6. Create a [employer]yloot password. You can change your password at any time. 7. Enter your Password Reset Question and Answer. This can be used at a later time if you forget your password. 8. Enter your e-mail address. You will receive e-mail notification when new information is available in [address] 9. Click Sign Up. You can now view and download portions of your medical record. 10. Click the Download Summary menu link to download a portable copy of your medical information.  
 
If you have questions, please visit the Frequently Asked Questions section of the [employer]. Remember, [employer] is NOT to be used for urgent needs. For medical emergencies, dial 911. Now available from your iPhone and Android! Please provide this summary of care documentation to your next provider. Your primary care clinician is listed as Mounika Aguilar. If you have any questions after today's visit, please call [phone].

## 2018-05-16 ENCOUNTER — HOSPITAL ENCOUNTER (EMERGENCY)
Age: 23
Discharge: HOME | End: 2018-05-16
Attending: EMERGENCY MEDICINE
Payer: MEDICAID

## 2018-05-16 ENCOUNTER — HOSPITAL ENCOUNTER (OUTPATIENT)
Dept: MRI IMAGING | Age: 23
Discharge: HOME | End: 2018-05-16
Attending: FAMILY MEDICINE
Payer: MEDICAID

## 2018-05-16 VITALS
TEMPERATURE: 98 F | HEART RATE: 90 BPM | DIASTOLIC BLOOD PRESSURE: 60 MMHG | RESPIRATION RATE: 16 BRPM | SYSTOLIC BLOOD PRESSURE: 121 MMHG | OXYGEN SATURATION: 100 %

## 2018-05-16 DIAGNOSIS — G89.29 CHRONIC PAIN OF LEFT KNEE: Primary | ICD-10-CM

## 2018-05-16 DIAGNOSIS — M23.92 INTERNAL DERANGEMENT OF LEFT KNEE: ICD-10-CM

## 2018-05-16 DIAGNOSIS — M25.562 CHRONIC PAIN OF LEFT KNEE: Primary | ICD-10-CM

## 2018-05-16 DIAGNOSIS — M25.462 EFFUSION OF LEFT KNEE: ICD-10-CM
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
## 2018-05-16 PROCEDURE — 73721 MRI JNT OF LWR EXTRE W/O DYE: CPT

## 2018-05-16 PROCEDURE — 99283 EMERGENCY DEPT VISIT LOW MDM: CPT

## 2018-05-16 NOTE — ED PROVIDER NOTES
EMERGENCY DEPARTMENT HISTORY AND PHYSICAL EXAM    11:06 AM      Date: 5/16/2018  Patient Name: Remedios Ballesteros    History of Presenting Illness     Chief Complaint   Patient presents with    Fall         History Provided By: Patient    Chief Complaint: left knee   Duration:  Months  Timing:  chronic   Location: left knee medially   Quality: Aching  Severity: Moderate  Modifying Factors:    Associated Symptoms: denies any other associated signs or symptoms      Additional History (Context): Remedios Ballesteros is a 20 y.o. male with No significant past medical history who presents with left knee pain. He was walking into radiology for his MRI, his knee gave out and caused him to collapse. He was brought to ER for evaluation as a code green. Known issues with left knee, his pain is chronic. He does not wear the knee immobilizer on his knee because \"it hurts\", instead wears it around his ankle. The pain today is not new. PCP: Adrian Cortez MD    Current Outpatient Prescriptions   Medication Sig Dispense Refill    naproxen (NAPROSYN) 500 mg tablet Take 1 Tab by mouth two (2) times daily (with meals). 60 Tab 0    risperiDONE (RISPERDAL) 1 mg tablet Take 1 Tab by mouth two (2) times a day.  Indications: mood stabilization 60 Tab 0       Past History     Past Medical History:  Past Medical History:   Diagnosis Date    [facility] [address]     Intermittent explosive disorder     Psychiatric disorder     schitzophrenic    Schizophrenia, schizo-affective ([facility] [address]        Past Surgical History:  Past Surgical History:   Procedure Laterality Date    HX TONSIL AND ADENOIDECTOMY         Family History:  Family History   Problem Relation Age of Onset    Diabetes Mother     Sleep Apnea Mother        Social History:  Social History   Substance Use Topics    Smoking status: Current Every Day Smoker     Packs/day: 1.00     Years: 10.00    Smokeless tobacco: Never Used    Alcohol use No       Allergies:  No Known Allergies      Review of Systems       Review of Systems   Constitutional: Negative for fever. HENT: Negative for facial swelling. Eyes: Negative for visual disturbance. Respiratory: Negative for shortness of breath. Cardiovascular: Negative for chest pain. Gastrointestinal: Negative for abdominal pain. Genitourinary: Negative for dysuria. Musculoskeletal: Positive for arthralgias. Negative for neck pain. Skin: Negative for rash. Neurological: Negative for dizziness. Psychiatric/Behavioral: Negative for confusion. All other systems reviewed and are negative. Physical Exam     Visit Vitals    /60 (BP 1 Location: Right arm, BP Patient Position: At rest)    Pulse 90    Temp 98 °F (36.7 °C)    Resp 16    SpO2 100%         Physical Exam   Constitutional: He appears well-developed and well-nourished. No distress. HENT:   Head: Normocephalic and atraumatic. Eyes: Conjunctivae are normal.   Neck: Normal range of motion. Neck supple. Cardiovascular: Normal rate. Pulmonary/Chest: Effort normal.   Abdominal: Soft. Musculoskeletal: Normal range of motion. Medial tenderness. Limited ROM due to pain. No swelling,. Neurological: He is alert. Skin: Skin is warm and dry. He is not diaphoretic. Psychiatric: He has a normal mood and affect. Nursing note and vitals reviewed. Diagnostic Study Results     Labs -  No results found for this or any previous visit (from the past 12 hour(s)). Radiologic Studies -   No orders to display         Medical Decision Making   I am the first provider for this patient. I reviewed the vital signs, available nursing notes, past medical history, past surgical history, family history and social history. Vital Signs-Reviewed the patient's vital signs.       Records Reviewed: Nursing Notes (Time of Review: 11:06 AM)    ED Course: Progress Notes, Reevaluation, and Consults:      Provider Notes (Medical Decision Making): MDM  Number of Diagnoses or Management Options  Chronic pain of left knee:   Fall, initial encounter:   Diagnosis management comments: Patient has MRI scheduled in 15 minutes. Recommend discharge without Xray and instead proceeding directly to MRI. Reinforced the importance of wearing the immobilizer properly. Diagnosis     Clinical Impression:   1. Chronic pain of left knee    2. Fall, initial encounter        Disposition:     Follow-up Information     Follow up With Details Comments Contact Info    N Phares Gottron, MD Schedule an appointment as soon as possible for a visit  [address] [id number] [phone]      [facility] EMERGENCY DEPT  Immediately if symptoms worsen [address]  [phone]           Patient's Medications   Start Taking    No medications on file   Continue Taking    NAPROXEN (NAPROSYN) 500 MG TABLET    Take 1 Tab by mouth two (2) times daily (with meals). RISPERIDONE (RISPERDAL) 1 MG TABLET    Take 1 Tab by mouth two (2) times a day. Indications: mood stabilization   These Medications have changed    No medications on file   Stop Taking    No medications on file     _______________________________    Attestations:  Scribe Attestation     He Gaytan PA-C acting as a scribe for and in the presence of CARMINE Guerrero      May 16, 2018 at 11:10 AM       Provider Attestation:      I personally performed the services described in the documentation, reviewed the documentation, as recorded by the scribe in my presence, and it accurately and completely records my words and actions.  May 16, 2018 at 11:10 AM - CARMINE Guerrero  _______________________________

## 2018-05-16 NOTE — DISCHARGE INSTRUCTIONS
Knee Pain or Injury: Care Instructions  Your Care Instructions    Injuries are a common cause of knee problems. Sudden (acute) injuries may be caused by a direct blow to the knee. They can also be caused by abnormal twisting, bending, or falling on the knee. Pain, bruising, or swelling may be severe, and may start within minutes of the injury. Overuse is another cause of knee pain. Other causes are climbing stairs, kneeling, and other activities that use the knee. Everyday wear and tear, especially as you get older, also can cause knee pain. Rest, along with home treatment, often relieves pain and allows your knee to heal. If you have a serious knee injury, you may need tests and treatment. Follow-up care is a key part of your treatment and safety. Be sure to make and go to all appointments, and call your doctor if you are having problems. It's also a good idea to know your test results and keep a list of the medicines you take. How can you care for yourself at home? · Be safe with medicines. Read and follow all instructions on the label. ¨ If the doctor gave you a prescription medicine for pain, take it as prescribed. ¨ If you are not taking a prescription pain medicine, ask your doctor if you can take an over-the-counter medicine. · Rest and protect your knee. Take a break from any activity that may cause pain. · Put ice or a cold pack on your knee for 10 to 20 minutes at a time. Put a thin cloth between the ice and your skin. · Prop up a sore knee on a pillow when you ice it or anytime you sit or lie down for the next 3 days. Try to keep it above the level of your heart. This will help reduce swelling. · If your knee is not swollen, you can put moist heat, a heating pad, or a warm cloth on your knee. · If your doctor recommends an elastic bandage, sleeve, or other type of support for your knee, wear it as directed.   · Follow your doctor's instructions about how much weight you can put on your leg. Use a cane, crutches, or a walker as instructed. · Follow your doctor's instructions about activity during your healing process. If you can do mild exercise, slowly increase your activity. · Reach and stay at a healthy weight. Extra weight can strain the joints, especially the knees and hips, and make the pain worse. Losing even a few pounds may help. When should you call for help? Call 911 anytime you think you may need emergency care. For example, call if:  ? · You have symptoms of a blood clot in your lung (called a pulmonary embolism). These may include:  ¨ Sudden chest pain. ¨ Trouble breathing. ¨ Coughing up blood. ?Call your doctor now or seek immediate medical care if:  ? · You have severe or increasing pain. ? · Your leg or foot turns cold or changes color. ? · You cannot stand or put weight on your knee. ? · Your knee looks twisted or bent out of shape. ? · You cannot move your knee. ? · You have signs of infection, such as:  ¨ Increased pain, swelling, warmth, or redness. ¨ Red streaks leading from the knee. ¨ Pus draining from a place on your knee. ¨ A fever. ? · You have signs of a blood clot in your leg (called a deep vein thrombosis), such as:  ¨ Pain in your calf, back of the knee, thigh, or groin. ¨ Redness and swelling in your leg or groin. ? Watch closely for changes in your health, and be sure to contact your doctor if:  ? · You have tingling, weakness, or numbness in your knee. ? · You have any new symptoms, such as swelling. ? · You have bruises from a knee injury that last longer than 2 weeks. ? · You do not get better as expected. Where can you learn more? Go to http://ananya-clarita.info/. Enter K195 in the search box to learn more about \"Knee Pain or Injury: Care Instructions. \"  Current as of: March 20, 2017  Content Version: 11.4  © 2150-1620 [employer].  Care instructions adapted under license by [employer] (which disclaims liability or warranty for this information). If you have questions about a medical condition or this instruction, always ask your healthcare professional. [address] any warranty or liability for your use of this information.

## 2018-05-31 ENCOUNTER — OFFICE VISIT (OUTPATIENT)
Dept: ORTHOPEDIC SURGERY | Age: 23
End: 2018-05-31

## 2018-05-31 VITALS
HEIGHT: 71 IN | WEIGHT: 195 LBS | HEART RATE: 120 BPM | BODY MASS INDEX: 27.3 KG/M2 | RESPIRATION RATE: 15 BRPM | SYSTOLIC BLOOD PRESSURE: 128 MMHG | DIASTOLIC BLOOD PRESSURE: 70 MMHG | TEMPERATURE: 98.7 F

## 2018-05-31 DIAGNOSIS — M25.462 KNEE EFFUSION, LEFT: ICD-10-CM

## 2018-05-31 DIAGNOSIS — M76.32 IT BAND SYNDROME, LEFT: ICD-10-CM

## 2018-05-31 DIAGNOSIS — M22.2X2 PATELLOFEMORAL DISORDER OF LEFT KNEE: Primary | ICD-10-CM

## 2018-05-31 RX ORDER — TRAZODONE HYDROCHLORIDE 100 MG/1
100 TABLET ORAL
COMMUNITY
End: 2019-08-26

## 2018-05-31 NOTE — MR AVS SNAPSHOT
[address] [place] [address], [address] [facility] [id number] 
[phone] Patient: Aurelia Nageotte MRN: [id number] :1995 Visit Information Date & Time Provider Department Dept. Phone Encounter #  
 2018  3:40 PM Kennis Cogan, [facility] - [facility] [phone] [id number] Follow-up Instructions Return in about 4 weeks (around 2018) for PFS. Upcoming Health Maintenance Date Due Pneumococcal 19-64 Medium Risk (1 of 1 - PPSV23) 2013 DTaP/Tdap/Td series (1 - Tdap) 2015 Influenza Age 5 to Adult 2018 Allergies as of 2018  Review Complete On: 2018 By: Kennis Cogan, DO No Known Allergies Current Immunizations  Never Reviewed No immunizations on file. Not reviewed this visit You Were Diagnosed With   
  
 Codes Comments Patellofemoral disorder of left knee    -  Primary ICD-10-CM: G48.9G4 ICD-9-CM: 719.96 It band syndrome, left     ICD-10-CM: M76.32 
ICD-9-CM: 728.89 Knee effusion, left     ICD-10-CM: M25.462 ICD-9-CM: 719.06 Vitals BP Pulse Temp Resp Height(growth percentile) Weight(growth percentile) 128/70 (!) 120 98.7 °F (37.1 °C) 15 5' 11\" (1.803 m) 195 lb (88.5 kg) BMI Smoking Status 27.2 kg/m2 Current Every Day Smoker BMI and BSA Data Body Mass Index Body Surface Area  
 27.2 kg/m 2 2.11 m 2 Your Updated Medication List  
  
   
This list is accurate as of 18  4:08 PM.  Always use your most recent med list.  
  
  
  
  
 naproxen 500 mg tablet Commonly known as:  NAPROSYN Take 1 Tab by mouth two (2) times daily (with meals). risperiDONE 1 mg tablet Commonly known as:  RisperDAL Take 1 Tab by mouth two (2) times a day. Indications: mood stabilization  
  
 traZODone 100 mg tablet Commonly known as:  Laquita Deeds Take 100 mg by mouth nightly. We Performed the Following REFERRAL TO PHYSICAL THERAPY [UHO20 Custom] Comments:  
 Evaluate and Treat 2 per week for 4-6 weeks 
 
[phone] Follow-up Instructions Return in about 4 weeks (around 6/28/2018) for PFS. Referral Information Referral ID Referred By Referred To  
  
 [id number] Caroline Single 411 [address], [facility] [place]note 2[place] [facility] Phone: [phone] Visits Status Start Date End Date 1 New Request 5/31/18 5/31/19 If your referral has a status of pending review or denied, additional information will be sent to support the outcome of this decision. Patient Instructions   
CoalGuadalupecator.Ziploop Search YouApollidonube for my channel: 
 
Dr. Den Teixeira Patellofemoral Pain Syndrome: Care Instructions Your Care Instructions Patellofemoral pain syndrome is pain in the front of the knee. It is caused by overuse, weak thigh muscles (quadriceps), or a problem with the way the kneecap moves. Extra weight may also cause this syndrome. The patella is the kneecap, and the femur is the thighbone. Some people may have pain in the front of the knee from a condition called chondromalacia. In this problem, the underside of the knee cartilage wears down and frays. Cartilage is a rubbery tissue that cushions joints. In some cases, the kneecap does not move, or track, in a normal way. You may have knee pain when you run, walk down hills or steps, or do another activity. Sitting for a long time also can cause knee pain. Your knee pain may get better with medicines for pain and swelling and exercises to make your quadriceps stronger. Losing weight, if you need to, may also help with pain. Follow-up care is a key part of your treatment and safety.  Be sure to make and go to all appointments, and call your doctor if you are having problems. It's also a good idea to know your test results and keep a list of the medicines you take. How can you care for yourself at home? · Ask your doctor if you can take an over-the-counter pain medicine, such as acetaminophen (Tylenol), ibuprofen (Advil, Motrin), or naproxen (Aleve). Be safe with medicines. Read and follow all instructions on the label. · Rest and protect your knee. Take a break from activities that cause pain, such as long periods of sitting or kneeling. · Put ice or a cold pack on your knee for 10 to 20 minutes after activity. Put a thin cloth between the ice and your skin. · If your doctor recommends an elastic bandage, sleeve, or other type of support for your knee, wear it as directed. · If your knee is not swollen, you can put moist heat, a heating pad, or a warm cloth on your knee. After several days of rest, you can begin gentle exercise of your knee. · Reach and stay at a healthy weight. Being overweight puts stress on your knees. · Wear athletic shoes that offer good support, especially if you run. · Use shoe inserts, or orthotics, if they help reduce your knee pain. Many drugstores and shoe stores sell them. · See a physical therapist to learn more exercises and stretches to make your legs stronger. When should you call for help? Watch closely for changes in your health, and be sure to contact your doctor if: 
? · Your knee pain does not get better or gets worse. Where can you learn more? Go to http://ananya-clarita.info/. Enter G282 in the search box to learn more about \"Patellofemoral Pain Syndrome: Care Instructions. \" Current as of: March 21, 2017 Content Version: 11.4 © 2966-5773 [employer]. Care instructions adapted under license by [employer] (which disclaims liability or warranty for this information).  If you have questions about a medical condition or this instruction, always ask your healthcare professional. [place] [address] any warranty or liability for your use of this information. Introducing [facility]! Fadumo Ferrell introduces Alignable patient portal. Now you can access parts of your medical record, email your doctor's office, and request medication refills online. 1. In your internet browser, go to [email or web address] 2. Click on the First Time User? Click Here link in the Sign In box. You will see the New Member Sign Up page. 3. Enter your Alignable Access Code exactly as it appears below. You will not need to use this code after youve completed the sign-up process. If you do not sign up before the expiration date, you must request a new code. · Alignable Access Code: [email or web address] Expires: 6/30/2018  7:17 PM 
 
4. Enter the last four digits of your Social Security Number (xxxx) and Date of Birth (mm/dd/yyyy) as indicated and click Submit. You will be taken to the next sign-up page. 5. Create a Alignable ID. This will be your Alignable login ID and cannot be changed, so think of one that is secure and easy to remember. 6. Create a Alignable password. You can change your password at any time. 7. Enter your Password Reset Question and Answer. This can be used at a later time if you forget your password. 8. Enter your e-mail address. You will receive e-mail notification when new information is available in [address] 9. Click Sign Up. You can now view and download portions of your medical record. 10. Click the Download Summary menu link to download a portable copy of your medical information. If you have questions, please visit the Frequently Asked Questions section of the Alignable website. Remember, Alignable is NOT to be used for urgent needs. For medical emergencies, dial 911. Now available from your iPhone and Android! Please provide this summary of care documentation to your next provider. Your primary care clinician is listed as Tre Abarca. If you have any questions after today's visit, please call [phone].

## 2018-05-31 NOTE — PATIENT INSTRUCTIONS
[email or web address]    Search YouTube for my channel:    Dr. Jeffrey Marino    Runner's Knee             Patellofemoral Pain Syndrome: Care Instructions  Your Care Instructions  Patellofemoral pain syndrome is pain in the front of the knee. It is caused by overuse, weak thigh muscles (quadriceps), or a problem with the way the kneecap moves. Extra weight may also cause this syndrome. The patella is the kneecap, and the femur is the thighbone. Some people may have pain in the front of the knee from a condition called chondromalacia. In this problem, the underside of the knee cartilage wears down and frays. Cartilage is a rubbery tissue that cushions joints. In some cases, the kneecap does not move, or track, in a normal way. You may have knee pain when you run, walk down hills or steps, or do another activity. Sitting for a long time also can cause knee pain. Your knee pain may get better with medicines for pain and swelling and exercises to make your quadriceps stronger. Losing weight, if you need to, may also help with pain. Follow-up care is a key part of your treatment and safety. Be sure to make and go to all appointments, and call your doctor if you are having problems. It's also a good idea to know your test results and keep a list of the medicines you take. How can you care for yourself at home? · Ask your doctor if you can take an over-the-counter pain medicine, such as acetaminophen (Tylenol), ibuprofen (Advil, Motrin), or naproxen (Aleve). Be safe with medicines. Read and follow all instructions on the label. · Rest and protect your knee. Take a break from activities that cause pain, such as long periods of sitting or kneeling. · Put ice or a cold pack on your knee for 10 to 20 minutes after activity. Put a thin cloth between the ice and your skin.   · If your doctor recommends an elastic bandage, sleeve, or other type of support for your knee, wear it as directed. · If your knee is not swollen, you can put moist heat, a heating pad, or a warm cloth on your knee. After several days of rest, you can begin gentle exercise of your knee. · Reach and stay at a healthy weight. Being overweight puts stress on your knees. · Wear athletic shoes that offer good support, especially if you run. · Use shoe inserts, or orthotics, if they help reduce your knee pain. Many drugstores and shoe stores sell them. · See a physical therapist to learn more exercises and stretches to make your legs stronger. When should you call for help? Watch closely for changes in your health, and be sure to contact your doctor if:  ? · Your knee pain does not get better or gets worse. Where can you learn more? Go to http://ananya-clarita.info/. Enter P083 in the search box to learn more about \"Patellofemoral Pain Syndrome: Care Instructions. \"  Current as of: March 21, 2017  Content Version: 11.4  © 8219-0624 [employer]. Care instructions adapted under license by [employer] (which disclaims liability or warranty for this information). If you have questions about a medical condition or this instruction, always ask your healthcare professional. [address] any warranty or liability for your use of this information.

## 2018-05-31 NOTE — PROGRESS NOTES
HISTORY OF PRESENT ILLNESS    Doug Woods is a 20y.o. year old male comes in today to be evaluated and treated for: left knee pain/MRI    Since last appt MRI knee done 5/16/18 which showed no meniscal tear but possible LJB and likely inflammation insertion IT band. Pain 10/10. Using crutches/immobilizer still. Social History     Social History    Marital status: SINGLE     Spouse name: N/A    Number of children: N/A    Years of education: N/A     Social History Main Topics    Smoking status: Current Every Day Smoker     Packs/day: 1.00     Years: 10.00    Smokeless tobacco: Never Used    Alcohol use No    Drug use: Yes     Special: Marijuana      Comment: history of abuse of crack    Sexual activity: Not Asked     Other Topics Concern    None     Social History Narrative     Current Outpatient Prescriptions   Medication Sig Dispense Refill    traZODone (DESYREL) 100 mg tablet Take 100 mg by mouth nightly.  naproxen (NAPROSYN) 500 mg tablet Take 1 Tab by mouth two (2) times daily (with meals). 60 Tab 0    risperiDONE (RISPERDAL) 1 mg tablet Take 1 Tab by mouth two (2) times a day. Indications: mood stabilization 60 Tab 0     Past Medical History:   Diagnosis Date    Bipolar 1 disorder ([address]     Intermittent explosive disorder     Psychiatric disorder     schitzophrenic    Schizophrenia, schizo-affective ([facility] [address]      Family History   Problem Relation Age of Onset    Diabetes Mother     Sleep Apnea Mother          ROS:  Some swell, no numb + buckling left knee    Objective:  Visit Vitals    /70    Pulse (!) 120    Temp 98.7 °F (37.1 °C)    Resp 15    Ht 5' 11\" (1.803 m)    Wt 195 lb (88.5 kg)    BMI 27.2 kg/m2     GEN: Appears stated age in NAD. HEAD:  Normocephalic, atraumatic. NEURO:  Sensation intact light touch B/L lower extremities. MS:  LE Strength +5/5 bilateral .   left Knee:  2+ Effusion . Lachman negative. Valgus negative. Varus negative.   negative joint line tenderness medial.  Johnson positive. Posterior drawer negative. Noble compression test negative. Patellar apprehension negative. Patellar facet  positive tender to palpation medial + crepitance right.  + TTP insertion IT band left + modified delphine test.  EXT: no clubbing/cyanosis. no edema. Assessment/Plan:     ICD-10-CM ICD-9-CM    1. Patellofemoral disorder of left knee M22.2X2 719.96    2. It band syndrome, left M76.32 728.89    3. Knee effusion, left M25.462 719.06        Patient verbalizes understanding of evaluation and plan. Will start PT and wean crutches/immobilizer and continue naproxen and RTC 6 weeks. Time with Pt 25 minutes, >50% of which was counseling pt regarding Dx and Tx options need for PT and coordination of care.

## 2018-06-08 ENCOUNTER — APPOINTMENT (OUTPATIENT)
Dept: PHYSICAL THERAPY | Age: 23
End: 2018-06-08

## 2018-06-20 ENCOUNTER — HOSPITAL ENCOUNTER (OUTPATIENT)
Dept: PHYSICAL THERAPY | Age: 23
Discharge: HOME | End: 2018-06-20
Payer: MEDICAID

## 2018-06-20 PROCEDURE — 97110 THERAPEUTIC EXERCISES: CPT

## 2018-06-20 PROCEDURE — 97014 ELECTRIC STIMULATION THERAPY: CPT

## 2018-06-20 PROCEDURE — 97162 PT EVAL MOD COMPLEX 30 MIN: CPT

## 2018-06-20 PROCEDURE — 97116 GAIT TRAINING THERAPY: CPT

## 2018-06-20 NOTE — PROGRESS NOTES
PT KNEE EVAL AND TREATMENT    Patient Name: Elayne Dorado  Date:2018  : 1995  [x]  Patient  Verified  Payor: [facility] MEDICAID / Plan: [facility] [address] [facility] CCCP / Product Type: Managed Care Medicaid /    In time:908  Out time:1002  Total Treatment Time (min): 64  Visit #: 1 of     Treatment Area: Chronic pain of left knee [M25.562, G89.29]    SUBJECTIVE  Pain Level (0-10 scale): (C): 7  (B): 5  (W): 10   Any medication changes, allergies to medications, diagnosis change, or new procedure performed: see summary sheet for update  Subjective functional status/changes:  CHIEF COMPLAINT: Patient presents with co L knee pain starting 7 mo ago after falling into a pot hole. Patient went to ER approx 1 mo ago because of progressive worsening of pain and was told he had a meniscus tear. Subsequent imaging including xrays and MRI reveal no noted tear by DO. Patient presents today with knee extension brace and B crutches sec to fear of the knee giving out. PLOF : ind with no AD and no limitations. DEFICITS: tub transfers , sleep deficits, amb with AD and brace,  walking tolerance: 10 min, bending, sit to stand, rec activities: bowling, basketball, baseball      OBJECTIVE  Posture: IR foot     Gait:  PWBing with crutches and brace in extension     ROM / Strength                               AROM                 Strength (1-5)  Hip Left Right Left Right   Flexion   3 p! Min quad lag 5   Abduction   3 p!     Extension   NT    Knee x x x x   Extension 0 WNL 3 p! 5   Flexion 118 WNL  3 p! 5    PROM full     Flexibility:   Hamstrings:  90/90 HS test NT   Quadriceps: Carmelina Test +  Gastroc:   NT   Other:     Palpation: significant TTP medial joint line     Optional Tests:  Patellar Positioning (Static) WNL   Patellar Tracking WNL      Patellar Mobility WNL - painful        Girth Measurements:     Cm at joint line   Left 39   Right  40             Other tests/comments:      Patient Education/ Therapeutic Exercise : [x] Discussed POT including PT expectation, established HEP with pictures and instruction, quad inhibition, brace use  (minutes) : 10    Gait : crutch training without brace, B crutches VCing for knee flexion , heel/toe pattern, avoid axillary compression  - 8min     Modality (rationale): decrease pain   [x]  E-Stim: type IFC with ice 10 min       Pain Level (0-10 scale) post treatment: 7  ASSESSMENT  [x]  See Plan of Care    PLAN  [x]  Upgrade activities as tolerated     [x] Other:_ POC  2-3 x 8-12    Mallory Tinoco, PT 6/20/2018     Justification for Eval Code Complexity:  Patient History : homeless, chronicity   Examination see exam   Clinical Presentation: unstable sec to elevated pain levels   Clinical Decision Making : FOTO : 26 /100

## 2018-06-20 NOTE — PROGRESS NOTES
[facility] PHYSICAL THERAPY AT [address]  Phone: [phone] Fax: [phone] / [address] [address] PHYSICAL THERAPY SERVICES  Patient Name: Brittanie Morataya : 1995   Medical   Diagnosis: Chronic pain of left knee [M25.562, G89.29] Treatment Diagnosis: L knee pain sec to immobilization    Onset Date: 7 mo ago      Referral Source: Jamarcus Maciel DO Start of Care [facility]): 2018   Prior Hospitalization: See medical history Provider #: [id number]   Prior Level of Function: Functional I prior to fall, no AD, hx R knee pain    Comorbidities: None noted   Medications: Verified on Patient Summary List   The Plan of Care and following information is based on the information from the initial evaluation.   ========================================================================  Assessment / key information:  Pt is a 20y.o. year old male who presents with co L knee pain starting 7 mo ago after falling into a pot hole. Patient went to ER approx 1 mo ago because of progressive worsening of pain and was told he had a meniscus tear. Subsequent imaging including xrays and MRI reveal no noted tear by DO. Patient presents today with knee extension brace and B crutches sec to fear of the knee giving out. PLOF : ind with no AD and no limitations. Current deficits include: increased pain to 10/10 at worst, decreased AROM and strength per chart, full AA/PROM with HEP instruction, poor gait quality with B crutches and extender brace, TTP medial and lateral joint line, mild atrophy sec to non use. Functional deficits include: tub transfers , sleep deficits, amb with AD and brace,  walking tolerance: 10 min, bending, sit to stand, rec activities: bowling, basketball, baseball  . Home exercise program initiated on initial evaluation to address these deficits.  Pt would benefit from PT to address these deficits for increased functional mobility and QOL. AROM                                     Strength (1-5)  Hip Left Right Left Right   Flexion     3 p! Min quad lag 5   Abduction     3 p!     Extension     NT     Knee x x x x   Extension 0 WNL 3 p! 5   Flexion 118 WNL  3 p! 5       ========================================================================  Eval Complexity: History: MEDIUM  Complexity : 1-2 comorbidities / personal factors will impact the outcome/ POC Exam:HIGH Complexity : 4+ Standardized tests and measures addressing body structure, function, activity limitation and / or participation in recreation  Presentation: MEDIUM Complexity : Evolving with changing characteristics  Clinical Decision Making:MEDIUM Complexity : FOTO score of 26-74Overall Complexity:MEDIUM  Problem List: pain affecting function, decrease ROM, decrease strength, impaired gait/ balance, decrease ADL/ functional abilitiies, decrease activity tolerance, decrease flexibility/ joint mobility, decrease transfer abilities and other FOTO 26/100   Treatment Plan may include any combination of the following: Therapeutic exercise, Therapeutic activities, Neuromuscular re-education, Physical agent/modality, Gait/balance training, Manual therapy, Aquatic therapy, Patient education, Self Care training, Functional mobility training, Home safety training and Stair training  Patient / Family readiness to learn indicated by: asking questions, trying to perform skills and interest  Persons(s) to be included in education: patient (P)  Barriers to Learning/Limitations: None  Measures taken:    Patient Goal (s): \"to walk again\"   Patient self reported health status: fair  Rehabilitation Potential: fair   Short Term Goals: To be accomplished in  1  weeks:  1. Pt will be independent and compliant with HEP   Long Term Goals: To be accomplished in  8-12  treatments:  1. Patient will increase FOTO score to 60/100 for indications of increased functional mobility.     2. Patient will demo AROM 0-135 for transfers and stair negotiation   3. Patient will demo 4/5 knee extension strength for decrease risk of falling   4. Patient will amb 300 feet without AD or brace for progression to PLOF and amb as primary mode of locomotion   Frequency / Duration:   Patient to be seen  2-3  times per week for 8-12  treatments:  Patient / Caregiver education and instruction: self care, activity modification and exercises  G-Codes (GP): FATUMA  Therapist Signature: Sadi Fuchs PT Date: 0/60/5361   Certification Period: NA Time: 1112am   ========================================================================  I certify that the above Physical Therapy Services are being furnished while the patient is under my care. I agree with the treatment plan and certify that this therapy is necessary. Physician Signature:        Date:       Time:   Please sign and return to [facility] or you may fax the signed copy to [phone]. Thank you.

## 2018-06-21 NOTE — PROGRESS NOTES
PT DAILY TREATMENT NOTE     Patient Name: Remedios Ballesteros  Date:2018  : 1995  [x]  Patient  Verified  Payor: [facility] MEDICAID / Plan: [facility] [address] [facility] CCCP / Product Type: Managed Care Medicaid /    In time: 9:02 am     Out time: 10:07 am  Total Treatment Time (min): 65  Visit #: 2 of     Treatment Area: Chronic pain of left knee [M25.562, G89.29]    SUBJECTIVE  Pain Level (0-10 scale): 8  Any medication changes, allergies to medications, adverse drug reactions, diagnosis change, or new procedure performed?: [x] No    [] Yes (see summary sheet for update)  Subjective functional status/changes:   [] No changes reported  \"I walked all the way here from [place] with just the one crutch. \" Patient arrives with 1 axillary crutch, slow mirela, antalgic.     OBJECTIVE  Modality rationale: decrease edema, decrease inflammation and decrease pain to improve the patients ability to > tolerance to prolonged amb   Min Type Additional Details   10 [x] Estim: []Att   [x]Unatt        []TENS instruct                  [x]IFC  []Premod   []NMES                     []Other:  []w/US   [x]w/ice   []w/heat  Position: semi-reclined with LE elevated on wedge  Location: (L) knee    []  Traction: [] Cervical       []Lumbar                       [] Prone          []Supine                       []Intermittent   []Continuous Lbs:  [] before manual  [] after manual    []  Ultrasound: []Continuous   [] Pulsed                           []1MHz   []3MHz Location:  W/cm2:    []  Iontophoresis with dexamethasone         Location: [] Take home patch   [] In clinic    []  Ice     []  heat  []  Ice massage Position:  Location:    []  Vasopneumatic Device Pressure:       [] lo [] med [] hi   Temperature: [] lo [] med [] hi   [x] Skin assessment post-treatment:  [x]intact []redness- no adverse reaction       []redness - adverse reaction:     53 min Therapeutic Exercise:  [x] See flow sheet: initiated therex per IE Rationale: increase ROM, increase strength, improve coordination and improve balance to improve the patients ability to perform ADLs    2 min Gait Training:  10 feet with 1 axillary crutch with SBA and mod (A) to slow descent to floor   Rationale: improve gait quality for community ambulation          X min Patient Education: [x] Review HEP from IE     Other Objective/Functional Measures:    Notable inconsistencies during treatment:    GT: patient req mod (A) to slowly descend to ground during (R) stance phase due to (L) knee pain; pt demos ability to stand from seated position without (A). Patient notes inc (L) knee pain during (R) SLS. Demos safety with amb into gym, but significant reduction in safety/stability with GT. Pain Level (0-10 scale) post treatment: 5    ASSESSMENT/Changes in Function:   Fair tolerance to treatment today. Several inconsistencies notes within treatment today. Patient notes IFC relieved pain for several hours after last tx, therefore, cont'd with (+) results. Patient will continue to benefit from skilled PT services to modify and progress therapeutic interventions, address functional mobility deficits, address ROM deficits, address strength deficits, analyze and address soft tissue restrictions, analyze and cue movement patterns, analyze and modify body mechanics/ergonomics, assess and modify postural abnormalities and address imbalance/dizziness to attain remaining goals. [x]  See Plan of Care  []  See progress note/recertification  []  See Discharge Summary         Progress towards goals / Updated goals:  Short Term Goals: To be accomplished in  1  weeks:  1. Pt will be independent and compliant with HEP. -Goal met; pt notes compliance (6/22/18)  Long Term Goals: To be accomplished in  8-12  treatments:  1. Patient will increase FOTO score to 60/100 for indications of increased functional mobility.     2.  Patient will demo AROM 0-135 for transfers and stair negotiation   3. Patient will demo 4/5 knee extension strength for decrease risk of falling   4.   Patient will amb 300 feet without AD or brace for progression to PLOF and amb as primary mode of locomotion     PLAN  [x]  Upgrade activities as tolerated     [x]  Continue plan of care  []  Update interventions per flow sheet       []  Discharge due to:_  []  Other:_      Delmar Meckel, PTA 6/22/2018

## 2018-06-22 ENCOUNTER — HOSPITAL ENCOUNTER (OUTPATIENT)
Dept: PHYSICAL THERAPY | Age: 23
Discharge: HOME | End: 2018-06-22
Payer: MEDICAID

## 2018-06-22 PROCEDURE — 97110 THERAPEUTIC EXERCISES: CPT

## 2018-06-22 PROCEDURE — 97014 ELECTRIC STIMULATION THERAPY: CPT

## 2018-06-25 ENCOUNTER — HOSPITAL ENCOUNTER (OUTPATIENT)
Dept: PHYSICAL THERAPY | Age: 23
Discharge: HOME | End: 2018-06-25
Payer: MEDICAID

## 2018-06-25 PROCEDURE — 97110 THERAPEUTIC EXERCISES: CPT

## 2018-06-25 PROCEDURE — 97014 ELECTRIC STIMULATION THERAPY: CPT

## 2018-06-25 NOTE — PROGRESS NOTES
PT DAILY TREATMENT NOTE     Patient Name: Sandra Mir  Date:2018  : 1995  [x]  Patient  Verified  Payor: Sarai Huizar / Plan: [facility] CCCP / Product Type: Managed Care Medicaid /    In time:10:52  Out time:11:50  Total Treatment Time (min): 58  Total Timed Codes (min): 48  1:1 Treatment Time (min): 48   Visit #: 3 of     Treatment Area: Chronic pain of left knee [M25.562, G89.29]    SUBJECTIVE  Pain Level (0-10 scale): 9  Any medication changes, allergies to medications, adverse drug reactions, diagnosis change, or new procedure performed?: [x] No    [] Yes (see summary sheet for update)  Subjective functional status/changes:   [] No changes reported  Pt ambulates into clinic with signif antalgic gait pattern, dragging the (L) LEG at times, alternating with hip hike and circumduction. No crutch, no brace. Pt notes, \"my leg has been giving out 7 times this morning\". Pt notes \"I'm afraid of falling. I've been doing my homework and flying up and down stairs. I did 3 flights yesterday\".    PT advised to hold on repetitive flights of stairs as that will irritate knee\"     OBJECTIVE  Modality rationale: decrease edema, decrease inflammation and decrease pain to improve the patients ability to ipmrove gait, sport, ADLs   Min Type Additional Details   10 [x] Estim: []Att   [x]Unatt        []TENS instruct                  [x]IFC  []Premod   []NMES                     []Other:  []w/US   [x]w/ice   []w/heat  Position: semi-reclined   Location: (L) knee    []  Traction: [] Cervical       []Lumbar                       [] Prone          []Supine                       []Intermittent   []Continuous Lbs:  [] before manual  [] after manual    []  Ultrasound: []Continuous   [] Pulsed                           []1MHz   []3MHz Location:  W/cm2:    []  Iontophoresis with dexamethasone         Location: [] Take home patch   [] In clinic    []  Ice     []  heat  []  Ice massage Position:  Location:    []  Vasopneumatic Device Pressure:       [] lo [] med [] hi   Temperature: [] lo [] med [] hi   [x] Skin assessment post-treatment:  [x]intact []redness- no adverse reaction       []redness - adverse reaction:       42 min Therapeutic Exercise:  [x] See flow sheet : initiate 3 way hip   Rationale: increase ROM, increase strength, improve coordination and improve balance to improve the patients ability to imporve ambulation, sports     6 min Gait Training:  Pre gait activities in the parallel bars to emphasize TKE in IC to mid stance and to encourage flexion at TS    Rationale:          x min Patient Education: [x] Review HEP    [] Progressed/Changed HEP based on: signif pt education today about importance of avoiding pain or activities that increase episodes of buckling. Avoid repeated stair climbing. [] positioning   [] body mechanics   [] transfers   [] heat/ice application        Other Objective/Functional Measures:     Pt notes pain with 3 way hip extension on the (L) LE. Corrected mechanics to avoid significant ROM and trunk flexion. While performing pre-gait activities in the parallel bars (pt with 2 UE assist) pt suddenly dropped to the ground, noting \"my knee buckled\". He scrambled to return to standing position noting, \"let's keep going. I've gotta do the hard stuff to get better\". AAROM 0 to 130 (L) knee  Full AROM with [place]     Pain Level (0-10 scale) post treatment: 5    ASSESSMENT/Changes in Function: Pt with no AD or brace but con't with max antalgic, abnormal gait. Progressing towards ROm goals.  Con't with difficulty addressing pain and gait improvements     Patient will continue to benefit from skilled PT services to modify and progress therapeutic interventions, address functional mobility deficits, address ROM deficits, address strength deficits, analyze and address soft tissue restrictions, analyze and cue movement patterns, analyze and modify body mechanics/ergonomics, assess and modify postural abnormalities, address imbalance/dizziness and instruct in home and community integration to attain remaining goals. []  See Plan of Care  []  See progress note/recertification  []  See Discharge Summary         Progress towards goals / Updated goals:  Short Term Goals: To be accomplished in  1  weeks:  1.  Pt will be independent and compliant with HEP. -Goal met; pt notes compliance (6/22/18)  Long Term Goals: To be accomplished in  8-12  treatments:  1.  Patient will increase FOTO score to 60/100 for indications of increased functional mobility.    2.  Patient will demo AROM 0-135 for transfers and stair negotiation   AAROM 0 to 130 (6/25/18)  3. Patient will demo 4/5 knee extension strength for decrease risk of falling  Progressing strength to tolerance (6/25/18)  4.   Patient will amb 300 feet without AD or brace for progression to PLOF and amb as primary mode of locomotion  No AD, no brace with max antalgic/abnormal gait (6/25/18)    PLAN  [x]  Upgrade activities as tolerated     [x]  Continue plan of care  []  Update interventions per flow sheet       []  Discharge due to:_  []  Other:_      Anniece Oppenheim, PT 6/25/2018  7:45 AM

## 2018-06-27 ENCOUNTER — HOSPITAL ENCOUNTER (OUTPATIENT)
Dept: PHYSICAL THERAPY | Age: 23
Discharge: HOME | End: 2018-06-27
Payer: MEDICAID

## 2018-06-27 PROCEDURE — 97110 THERAPEUTIC EXERCISES: CPT

## 2018-06-27 PROCEDURE — 97014 ELECTRIC STIMULATION THERAPY: CPT

## 2018-06-27 NOTE — PROGRESS NOTES
PT DAILY TREATMENT NOTE     Patient Name: Yaw Vang  Date:2018  : 1995  [x]  Patient  Verified  Payor: [facility] MEDICAID / Plan: [facility] [address] [facility] CCCP / Product Type: Managed Care Medicaid /    In time: 3:00 pm       Out time: 4:01 pm  Total Treatment Time (min): 61  Visit #: 4 of     Treatment Area: Chronic pain of left knee [M25.562, G89.29]    SUBJECTIVE  Pain Level (0-10 scale): 10  Any medication changes, allergies to medications, adverse drug reactions, diagnosis change, or new procedure performed?: [x] No    [] Yes (see summary sheet for update)  Subjective functional status/changes:   [] No changes reported  Pt ambulates into clinic with reduced antalgic gait pattern; cont's to drag the (L) LEG at times, alternating with hip hike and circumduction. No crutch, no brace. \"I am not doing the stairs until you tell me I can. \"    OBJECTIVE  Modality rationale: decrease edema, decrease inflammation and decrease pain to improve the patients ability to ipmrove gait, sport, ADLs   Min Type Additional Details   10 [x] Estim: []Att   [x]Unatt        []TENS instruct                  [x]IFC  []Premod   []NMES                     []Other:  []w/US   [x]w/ice   []w/heat  Position: semi-reclined   Location: (L) knee    []  Traction: [] Cervical       []Lumbar                       [] Prone          []Supine                       []Intermittent   []Continuous Lbs:  [] before manual  [] after manual    []  Ultrasound: []Continuous   [] Pulsed                           []1MHz   []3MHz Location:  W/cm2:    []  Iontophoresis with dexamethasone         Location: [] Take home patch   [] In clinic    []  Ice     []  heat  []  Ice massage Position:  Location:    []  Vasopneumatic Device Pressure:       [] lo [] med [] hi   Temperature: [] lo [] med [] hi   [x] Skin assessment post-treatment:  [x]intact []redness- no adverse reaction       []redness - adverse reaction:       51 min Therapeutic Exercise:  [x] See flow sheet: added SLR   Rationale: increase ROM, increase strength, improve coordination and improve balance to improve the patients ability to imporve ambulation, sports     0 min Gait Training:  NT   Rationale:          X min Patient Education: [x] Review HEP; avoid stair climbing, encouraged pt to obtain [facility] for initiation of GT with use NV     Other Objective/Functional Measures:   (-) quad lag with SLR  Patient reports buckling with pain during supine heel slides despite normal ROM - encouraged strongly to hold further heel slides, but pt adamant about continuing despite PTA advise - \"I want to push myself\"  Discussed at length activity modification for pain relief. Pain Level (0-10 scale) post treatment: 5    ASSESSMENT/Changes in Function:   Significant inc in gait quality, mirela, and ease of mobility with amb post treatment, visible TKE during terminal stance, but lacking normalized knee flexion at mid-stance. (-) quad lag with SLR and normal FAITH (heel to glut). Patient amb without buckling in clinic with inc mirela. Patient will continue to benefit from skilled PT services to modify and progress therapeutic interventions, address functional mobility deficits, address ROM deficits, address strength deficits, analyze and address soft tissue restrictions, analyze and cue movement patterns, analyze and modify body mechanics/ergonomics, assess and modify postural abnormalities, address imbalance/dizziness and instruct in home and community integration to attain remaining goals. [x]  See Plan of Care  []  See progress note/recertification  []  See Discharge Summary         Progress towards goals / Updated goals:  Short Term Goals: To be accomplished in  1  weeks:  1.  Pt will be independent and compliant with HEP.  -Goal met; pt notes compliance (6/22/18)  Long Term Goals: To be accomplished in  8-12  treatments:  1.  Patient will increase FOTO score to 60/100 for indications of increased functional mobility.    2.  Patient will demo AROM 0-135 for transfers and stair negotiation   AAROM 0 to 130 (6/25/18)  3. Patient will demo 4/5 knee extension strength for decrease risk of falling  Progressing strength to tolerance (6/25/18)  4.   Patient will amb 300 feet without AD or brace for progression to PLOF and amb as primary mode of locomotion  No AD, no brace with max antalgic/abnormal gait (6/25/18)    PLAN  [x]  Upgrade activities as tolerated     [x]  Continue plan of care  []  Update interventions per flow sheet       []  Discharge due to:_  [x]  Other: pt to bring in [facility] [place] to improve quality of gait and safety of gait; will initiate GT with use of [facility] JOHN Mi PTA  6/27/2018

## 2018-07-02 ENCOUNTER — HOSPITAL ENCOUNTER (OUTPATIENT)
Dept: PHYSICAL THERAPY | Age: 23
Discharge: HOME | End: 2018-07-02
Payer: MEDICAID

## 2018-07-02 PROCEDURE — 97110 THERAPEUTIC EXERCISES: CPT

## 2018-07-02 PROCEDURE — 97014 ELECTRIC STIMULATION THERAPY: CPT

## 2018-07-02 NOTE — PROGRESS NOTES
PT DAILY TREATMENT NOTE     Patient Name: Kathy Ferguson  Date:2018  : 1995  [x]  Patient  Verified  Payor: [facility] MEDICAID / Plan: [facility] [place] [address] [facility] CCCP / Product Type: Managed Care Medicaid /    In time: 3:01 pm     Out time: 4:20 pm  Total Treatment Time (min): 79  Visit #: 5 of     Treatment Area: Chronic pain of left knee [M25.562, G89.29]    SUBJECTIVE  Pain Level (0-10 scale): 8  Any medication changes, allergies to medications, adverse drug reactions, diagnosis change, or new procedure performed?: [x] No    [] Yes (see summary sheet for update)  Subjective functional status/changes:   [] No changes reported  \"I am pushing to get better. My knee still greg 3 times a day. \" Patient arrives without SPC.     OBJECTIVE  Modality rationale: decrease edema, decrease inflammation and decrease pain to improve the patients ability to ipmrove gait, sport, ADLs   Min Type Additional Details   10 [x] Estim: []Att   [x]Unatt        []TENS instruct                  [x]IFC  []Premod   []NMES                     []Other:  []w/US   [x]w/ice   []w/heat  Position: semi-reclined   Location: (L) knee    []  Traction: [] Cervical       []Lumbar                       [] Prone          []Supine                       []Intermittent   []Continuous Lbs:  [] before manual  [] after manual    []  Ultrasound: []Continuous   [] Pulsed                           []1MHz   []3MHz Location:  W/cm2:    []  Iontophoresis with dexamethasone         Location: [] Take home patch   [] In clinic    []  Ice     []  heat  []  Ice massage Position:  Location:    []  Vasopneumatic Device Pressure:       [] lo [] med [] hi   Temperature: [] lo [] med [] hi   [x] Skin assessment post-treatment:  [x]intact []redness- no adverse reaction       []redness - adverse reaction:       69 min Therapeutic Exercise:  [x] See flow sheet: added SLR x 4   Rationale: increase ROM, increase strength, improve coordination and improve balance to improve the patients ability to imporve ambulation, sports     0 min Gait Training:  NT   Rationale:          X min Patient Education: [x] Review HEP; encouraged pt to obtain [facility] for initiation of GT with use NV     Other Objective/Functional Measures:   (-) ELY  Will initiate GT NV. Pain Level (0-10 scale) post treatment: 5    ASSESSMENT/Changes in Function:   Fair tolerance to standing therex today. Patient cont's to demonstrate inconsistencies in treatment. Notable reduction in pain with IFC with CP, therefore, continued today. Gait remains abnormal lacking proper quad activation during midstance, however, pt able to occasionally normalize without cueing. Patient will continue to benefit from skilled PT services to modify and progress therapeutic interventions, address functional mobility deficits, address ROM deficits, address strength deficits, analyze and address soft tissue restrictions, analyze and cue movement patterns, analyze and modify body mechanics/ergonomics, assess and modify postural abnormalities, address imbalance/dizziness and instruct in home and community integration to attain remaining goals. [x]  See Plan of Care  []  See progress note/recertification  []  See Discharge Summary         Progress towards goals / Updated goals:  Short Term Goals: To be accomplished in  1  weeks:  1.  Pt will be independent and compliant with HEP. -Goal met; pt notes compliance (6/22/18)  Long Term Goals: To be accomplished in  8-12  treatments:  1.  Patient will increase FOTO score to 60/100 for indications of increased functional mobility.    2.  Patient will demo AROM 0-135 for transfers and stair negotiation   AAROM 0 to 130 (6/25/18)  3. Patient will demo 4/5 knee extension strength for decrease risk of falling  Progressing strength to tolerance (6/25/18)  4.   Patient will amb 300 feet without AD or brace for progression to PLOF and amb as primary mode of locomotion  No AD, no brace with max antalgic/abnormal gait (6/25/18)    PLAN  [x]  Upgrade activities as tolerated     [x]  Continue plan of care  []  Update interventions per flow sheet       []  Discharge due to:_  [x]  Other: pt to bring in [facility] NV to improve quality of gait and safety of gait; will initiate GT with use of SPC JOHN Mercado, PTA  7/2/2018

## 2018-07-05 ENCOUNTER — HOSPITAL ENCOUNTER (OUTPATIENT)
Dept: PHYSICAL THERAPY | Age: 23
Discharge: HOME | End: 2018-07-05
Payer: MEDICAID

## 2018-07-05 ENCOUNTER — OFFICE VISIT (OUTPATIENT)
Dept: ORTHOPEDIC SURGERY | Age: 23
End: 2018-07-05

## 2018-07-05 VITALS
WEIGHT: 183 LBS | TEMPERATURE: 98.2 F | RESPIRATION RATE: 15 BRPM | BODY MASS INDEX: 25.62 KG/M2 | HEIGHT: 71 IN | DIASTOLIC BLOOD PRESSURE: 70 MMHG | SYSTOLIC BLOOD PRESSURE: 111 MMHG | HEART RATE: 87 BPM

## 2018-07-05 DIAGNOSIS — M22.2X2 PATELLOFEMORAL DISORDER, LEFT: Primary | ICD-10-CM

## 2018-07-05 DIAGNOSIS — M76.32 IT BAND SYNDROME, LEFT: ICD-10-CM

## 2018-07-05 PROCEDURE — 97110 THERAPEUTIC EXERCISES: CPT

## 2018-07-05 PROCEDURE — 97140 MANUAL THERAPY 1/> REGIONS: CPT

## 2018-07-05 PROCEDURE — 97014 ELECTRIC STIMULATION THERAPY: CPT

## 2018-07-05 NOTE — PROGRESS NOTES
HISTORY OF PRESENT ILLNESS    Eusebia Ruiz is a 20y.o. year old male comes in today to be evaluated and treated for: left knee pain    Has been doing much better after stopping immobilizer and crutches. Will buckle a couple times a day but much less than prior. Pain 7/10 today and has appt with them today. Some swelling, no numb. IMAGING: MRI left knee 5/16/18 report:  1. No evidence of ligamentous or meniscal injury or acute osseous pathology. 2. There is a small rounded loose body along the inferior medial margin of the  patella without clear donor site. 3. Mild localized edema in the prefemoral fat and deep to the distal IT  band/lateral retinaculum. Findings suggest mild inflammation/impingement,  possibly in the setting of iliotibial band friction syndrome. Clinical  correlation recommended.           Social History     Social History    Marital status: SINGLE     Spouse name: N/A    Number of children: N/A    Years of education: N/A     Social History Main Topics    Smoking status: Current Every Day Smoker     Packs/day: 1.00     Years: 10.00    Smokeless tobacco: Never Used    Alcohol use No    Drug use: Yes     Special: Marijuana      Comment: history of abuse of crack    Sexual activity: Not Asked     Other Topics Concern    None     Social History Narrative     Current Outpatient Prescriptions   Medication Sig Dispense Refill    traZODone (DESYREL) 100 mg tablet Take 100 mg by mouth nightly.  naproxen (NAPROSYN) 500 mg tablet Take 1 Tab by mouth two (2) times daily (with meals). 60 Tab 0    risperiDONE (RISPERDAL) 1 mg tablet Take 1 Tab by mouth two (2) times a day.  Indications: mood stabilization 60 Tab 0     Past Medical History:   Diagnosis Date    Bipolar 1 disorder ([address]     Intermittent explosive disorder     Psychiatric disorder     schitzophrenic    Schizophrenia, schizo-affective ([address]      Family History   Problem Relation Age of Onset    Diabetes Mother     Sleep Apnea Mother          ROS:  See HPI    Objective:  /70  Pulse 87  Temp 98.2 °F (36.8 °C)  Resp 15  Ht 5' 11\" (1.803 m)  Wt 183 lb (83 kg)  BMI 25.52 kg/m2  GEN: Appears stated age in NAD. HEAD:  Normocephalic, atraumatic. NEURO:  Sensation intact light touch B/L lower extremities. MS:  LE Strength +5/5 bilateral .   left Knee:  1+ Effusion .  Lachman negative.  Valgus negative.  Varus negative.  negative joint line tenderness medial.  Johnson positive.  Posterior drawer negative.  Noble compression test negative.  Patellar apprehension negative.  Patellar facet  positive tender to palpation medial minimal crepitance right.  + TTP insertion IT band left + modified delphine test.  EXT: no clubbing/cyanosis.  no edema. Assessment/Plan:     ICD-10-CM ICD-9-CM    1. Patellofemoral disorder, left M22.2X2 719.96    2. It band syndrome, left M76.32 728.89        Patient verbalizes understanding of evaluation and plan. Will continue current and return 4 weeks.

## 2018-07-05 NOTE — PROGRESS NOTES
PT DAILY TREATMENT NOTE     Patient Name: Natalie Fothergill  Date:2018  : 1995  [x]  Patient  Verified  Payor: Geovanna Weiner / Plan: [facility] [place] [address] [facility] CCCP / Product Type: Managed Care Medicaid /    In time:4:04  Out time:5:15  Total Treatment Time (min): 71  Total Timed Codes (min): 61  1:1 Treatment Time (min): 61   Visit #: 6 of     Treatment Area: Chronic pain of left knee [M25.562, G89.29]    SUBJECTIVE  Pain Level (0-10 scale): 8  Any medication changes, allergies to medications, adverse drug reactions, diagnosis change, or new procedure performed?: [x] No    [] Yes (see summary sheet for update)  Subjective functional status/changes:   [] No changes reported  \"i'm in pain. My knee is giving out 2x/day with walking. Pain is in back and the side of my knee.     Improvements: Walking 20 minutes duration,   Deficits: stairs     OBJECTIVE  Modality rationale: decrease edema, decrease inflammation and decrease pain to improve the patients ability to improve stability with gait    Min Type Additional Details   10 [x] Estim: []Att   [x]Unatt        []TENS instruct                  [x]IFC  []Premod   []NMES                     []Other:  []w/US   [x]w/ice   []w/heat  Position: semi-reclined  Location: L knee    []  Traction: [] Cervical       []Lumbar                       [] Prone          []Supine                       []Intermittent   []Continuous Lbs:  [] before manual  [] after manual    []  Ultrasound: []Continuous   [] Pulsed                           []1MHz   []3MHz Location:  W/cm2:    []  Iontophoresis with dexamethasone         Location: [] Take home patch   [] In clinic    []  Ice     []  heat  []  Ice massage Position:  Location:    []  Vasopneumatic Device Pressure:       [] lo [] med [] hi   Temperature: [] lo [] med [] hi   [x] Skin assessment post-treatment:  [x]intact []redness- no adverse reaction       []redness - adverse reaction:       53 ( min Therapeutic Exercise: [x] See flow sheet :   Rationale: increase ROM, increase strength, improve coordination and improve balance to improve the patients ability to improve gait, working    8 min Manual Therapy:  IASTM to distal quad, patellar glides for pain relief, gastroc stretch 3x30\"   Rationale: decrease pain, increase ROM, increase tissue extensibility and decrease trigger points to improve gait, mobility     NI min Gait Training:  ___ feet with ___ device on level surfaces with ___ level of assist   Rationale: with SPC  - pt did not bring SPC           x min Patient Education: [x] Review HEP    [] Progressed/Changed HEP based on:   [] positioning   [] body mechanics   [] transfers   [] heat/ice application        Other Objective/Functional Measures:     Pain Level (0-10 scale) post treatment: 10    ASSESSMENT/Changes in Function: pt co'nt with fluctuating buckling episodes when WB or NWB, anterior or posterior knee. Good response to IASTM to the (L) quad with decreased c/o pain but pain returned with AROM of the L knee. Full PROM and AAROM     Patient will continue to benefit from skilled PT services to modify and progress therapeutic interventions, address functional mobility deficits, address ROM deficits, address strength deficits, analyze and address soft tissue restrictions, analyze and cue movement patterns, analyze and modify body mechanics/ergonomics, assess and modify postural abnormalities and instruct in home and community integration to attain remaining goals. []  See Plan of Care  []  See progress note/recertification  []  See Discharge Summary         Progress towards goals / Updated goals:  Short Term Goals: To be accomplished in  1  weeks:  1.  Pt will be independent and compliant with HEP.  -Goal met; pt notes compliance (6/22/18)  Long Term Goals: To be accomplished in  8-12  treatments:  1.  Patient will increase FOTO score to 60/100 for indications of increased functional mobility.    2.  Patient will demo AROM 0-135 for transfers and stair negotiation   AAROM 0 to 130 (6/25/18)  3.  Patient will demo 4/5 knee extension strength for decrease risk of falling  Progress ing strength as able but poor tolerance today (7/5/18)  4.  Patient will amb 300 feet without AD or brace for progression to PLOF and amb as primary mode of locomotion  No AD, no brace with max antalgic/abnormal gait (7/5/18)    PLAN  [x]  Upgrade activities as tolerated     [x]  Continue plan of care  []  Update interventions per flow sheet       []  Discharge due to:_  []  Other:_      Rosy Boo, PT 7/5/2018  8:26 AM

## 2018-07-05 NOTE — MR AVS SNAPSHOT
[address] [facility] [address] 
[phone] Patient: Madeleine Catalan MRN: [id number] :1995 Visit Information Date & Time Provider Department Dept. Phone Encounter #  
 2018  2:20 PM Jake Wang, [address] [facility] [phone] [id number] Follow-up Instructions Return in about 4 weeks (around 2018) for Left, PFS, distal IT band. Upcoming Health Maintenance Date Due Pneumococcal 19-64 Medium Risk (1 of 1 - PPSV23) 2013 DTaP/Tdap/Td series (1 - Tdap) 2015 Influenza Age 5 to Adult 2018 Allergies as of 2018  Review Complete On: 2018 By: Jake Wang DO No Known Allergies Current Immunizations  Never Reviewed No immunizations on file. Not reviewed this visit You Were Diagnosed With   
  
 Codes Comments Patellofemoral disorder, left    -  Primary ICD-10-CM: M22.2X2 ICD-9-CM: 719.96 It band syndrome, left     ICD-10-CM: M76.32 
ICD-9-CM: 728.89 Vitals BP Pulse Temp Resp Height(growth percentile) Weight(growth percentile) 111/70 87 98.2 °F (36.8 °C) 15 5' 11\" (1.803 m) 183 lb (83 kg) BMI Smoking Status 25.52 kg/m2 Current Every Day Smoker Vitals History BMI and BSA Data Body Mass Index Body Surface Area 25.52 kg/m 2 2.04 m 2 Your Updated Medication List  
  
   
This list is accurate as of 18  2:32 PM.  Always use your most recent med list.  
  
  
  
  
 naproxen 500 mg tablet Commonly known as:  NAPROSYN Take 1 Tab by mouth two (2) times daily (with meals). risperiDONE 1 mg tablet Commonly known as:  RisperDAL Take 1 Tab by mouth two (2) times a day. Indications: mood stabilization  
  
 traZODone 100 mg tablet Commonly known as:  [place] Askew Take 100 mg by mouth nightly. Follow-up Instructions Return in about 4 weeks (around 8/2/2018) for Left, PFS, distal IT band. To-Do List   
 07/05/2018  4:00 PM  
  Appointment with Fidelina Barnhart, PT at [facility] [address] [facility] [address] ([phone]) 07/09/2018 10:30 AM  
  Appointment with Fidelina Barnhart, PT at [facility] [address] [facility] [address] [facility] ([phone])  
  
 07/11/2018 11:30 AM  
  Appointment with Galina Valdez PTA at [facility] [address] [facility] [address] [facility] ([phone])  
  
 07/13/2018 9:30 AM  
  Appointment with Galina Valdez PTA at [facility] [address] PT [facility] ([phone])  
  
 07/16/2018 11:00 AM  
  Appointment with Galina Valdez PTA at [address] [place] [address] Drive PT [address] ([phone])  
  
 07/18/2018 10:30 AM  
  Appointment with Galina Valdez PTA at [facility] [address] [facility] ([phone])  
  
 07/20/2018 10:30 AM  
  Appointment with Galina Valdez PTA at [address] [place] [address] Drive PT [facility] ([phone]) Introducing [facility] SERVICES! Pauline Mcgovern introduces [facility] patient portal. Now you can access parts of your medical record, email your doctor's office, and request medication refills online. 1. In your internet browser, go to [email or web address] 2. Click on the First Time User? Click Here link in the Sign In box. You will see the New Member Sign Up page. 3. Enter your [facility] Access Code exactly as it appears below. You will not need to use this code after youve completed the sign-up process. If you do not sign up before the expiration date, you must request a new code. · [facility] Access Code: [email or web address] Expires: 9/30/2018  2:17 PM 
 
4. Enter the last four digits of your Social Security Number (xxxx) and Date of Birth (mm/dd/yyyy) as indicated and click Submit. You will be taken to the next sign-up page. 5. Create a MyChart ID. This will be your [employer] login ID and cannot be changed, so think of one that is secure and easy to remember. 6. Create a [facility] password. You can change your password at any time. 7. Enter your Password Reset Question and Answer. This can be used at a later time if you forget your password. 8. Enter your e-mail address. You will receive e-mail notification when new information is available in [address] 9. Click Sign Up. You can now view and download portions of your medical record. 10. Click the Download Summary menu link to download a portable copy of your medical information. If you have questions, please visit the Frequently Asked Questions section of the [employer] website. Remember, [employer] is NOT to be used for urgent needs. For medical emergencies, dial 911. Now available from your iPhone and Android! Please provide this summary of care documentation to your next provider. Your primary care clinician is listed as Jw Flanagan. If you have any questions after today's visit, please call [phone].

## 2018-07-09 ENCOUNTER — HOSPITAL ENCOUNTER (OUTPATIENT)
Dept: PHYSICAL THERAPY | Age: 23
Discharge: HOME | End: 2018-07-09
Payer: MEDICAID

## 2018-07-09 PROCEDURE — 97014 ELECTRIC STIMULATION THERAPY: CPT

## 2018-07-09 PROCEDURE — 97110 THERAPEUTIC EXERCISES: CPT

## 2018-07-09 NOTE — PROGRESS NOTES
PT DAILY TREATMENT NOTE     Patient Name: Joshua Godfrey  Date:2018  : 1995  [x]  Patient  Verified  Payor: Renan Freire / Plan: [facility] [place], [place] [address] [facility] CCCP / Product Type: Managed Care Medicaid /    In [id number]  Out time:1156  Total Treatment Time (min): 64  Visit #: 7 of     Treatment Area: Chronic pain of left knee [M25.562, G89.29]    SUBJECTIVE  Pain Level (0-10 scale): 5  Any medication changes, allergies to medications, adverse drug reactions, diagnosis change, or new procedure performed?: [x] No    [] Yes (see summary sheet for update)  Subjective functional status/changes:   [] No changes reported  \"Much better than it was. \"    OBJECTIVE  Modality rationale: decrease edema, decrease inflammation and decrease pain to improve the patients ability to improve stability with gait    Min Type Additional Details   10 [x] Estim: []Att   [x]Unatt        []TENS instruct                  [x]IFC  []Premod   []NMES                     []Other:  []w/US   [x]w/ice   []w/heat  Position: semi-reclined  Location: L knee    []  Traction: [] Cervical       []Lumbar                       [] Prone          []Supine                       []Intermittent   []Continuous Lbs:  [] before manual  [] after manual    []  Ultrasound: []Continuous   [] Pulsed                           []1MHz   []3MHz Location:  W/cm2:    []  Iontophoresis with dexamethasone         Location: [] Take home patch   [] In clinic    []  Ice     []  heat  []  Ice massage Position:  Location:    []  Vasopneumatic Device Pressure:       [] lo [] med [] hi   Temperature: [] lo [] med [] hi   [x] Skin assessment post-treatment:  [x]intact []redness- no adverse reaction       []redness - adverse reaction:       46 min Therapeutic Exercise:  [x] See flow sheet :   Rationale: increase ROM, increase strength, improve coordination and improve balance to improve the patients ability to improve gait, working    NT min Manual Therapy: Rationale: decrease pain, increase ROM, increase tissue extensibility and decrease trigger points to improve gait, mobility           x min Patient Education: [x] Review HEP           Other Objective/Functional Measures:     Falls: 3x yesterday and 1x today reported    Improvement : 20 min walking tolerance     Pain Level (0-10 scale) post treatment: 5    ASSESSMENT/Changes in Function: Patient reports increased confidence in his knee and decreased pain levels. Continue post treatment ES sec to reports of increased pain levels to 8/10 after therex. DC ROM therex sec to full functional ROM. Patient tolerated new therex well. VCing throughout treatment to avoid tibial IR with gait and therex - likely compensatory pattern to avoid pain. Patient will continue to benefit from skilled PT services to modify and progress therapeutic interventions, address functional mobility deficits, address ROM deficits, address strength deficits, analyze and address soft tissue restrictions, analyze and cue movement patterns, analyze and modify body mechanics/ergonomics, assess and modify postural abnormalities and instruct in home and community integration to attain remaining goals. [x]  See Plan of Care  []  See progress note/recertification  []  See Discharge Summary         Progress towards goals / Updated goals: All goals reviewed and progressing appropriately as of 7/9/2018  Short Term Goals: To be accomplished in  1  weeks:  1.  Pt will be independent and compliant with HEP. -Goal met; pt notes compliance (6/22/18)  Long Term Goals: To be accomplished in  8-12  treatments:  1.  Patient will increase FOTO score to 60/100 for indications of increased functional mobility.    2.  Patient will demo AROM 0-135 for transfers and stair negotiation   AAROM 0 to 130 (6/25/18)  3.  Patient will demo 4/5 knee extension strength for decrease risk of falling  Progress ing strength as able but poor tolerance today (7/5/18)  4.  Patient will amb 300 feet without AD or brace for progression to PLOF and amb as primary mode of locomotion  No AD, no brace with max antalgic/abnormal gait (7/5/18)    PLAN  [x]  Upgrade activities as tolerated     [x]  Continue plan of care  []  Update interventions per flow sheet       []  Discharge due to:_  [x]  Other:_ progress to NMES as tolerated      Gloria Smith, PT 7/9/2018  8:26 AM

## 2018-07-11 ENCOUNTER — HOSPITAL ENCOUNTER (OUTPATIENT)
Dept: PHYSICAL THERAPY | Age: 23
Discharge: HOME | End: 2018-07-11
Payer: MEDICAID

## 2018-07-11 PROCEDURE — 97014 ELECTRIC STIMULATION THERAPY: CPT

## 2018-07-11 PROCEDURE — 97110 THERAPEUTIC EXERCISES: CPT

## 2018-07-11 NOTE — PROGRESS NOTES
PT DAILY TREATMENT NOTE     Patient Name: Heena Farias  Date:2018  : 1995  [x]  Patient  Verified  Payor: Mayra Agent / Plan: [facility] [address] [facility] CCCP / Product Type: Managed Care Medicaid /    In time: 11:05 am                Out time: 11:52 am  Total Treatment Time (min): 47  Visit #: 8 of     Treatment Area: Chronic pain of left knee [M25.562, G89.29]    SUBJECTIVE  Pain Level (0-10 scale): 7  Any medication changes, allergies to medications, adverse drug reactions, diagnosis change, or new procedure performed?: [x] No    [] Yes (see summary sheet for update)  Subjective functional status/changes:   [] No changes reported  \"I am moving better today. \"    OBJECTIVE  Modality rationale: decrease edema, decrease inflammation and decrease pain to improve the patients ability to improve stability with gait    Min Type Additional Details   10 [x] Estim: []Att   [x]Unatt        []TENS instruct                  [x]IFC  []Premod   []NMES                     []Other:  []w/US   [x]w/ice   []w/heat  Position: semi-reclined  Location: (L) knee    []  Traction: [] Cervical       []Lumbar                       [] Prone          []Supine                       []Intermittent   []Continuous Lbs:  [] before manual  [] after manual    []  Ultrasound: []Continuous   [] Pulsed                           []1MHz   []3MHz Location:  W/cm2:    []  Iontophoresis with dexamethasone         Location: [] Take home patch   [] In clinic    []  Ice     []  heat  []  Ice massage Position:  Location:    []  Vasopneumatic Device Pressure:       [] lo [] med [] hi   Temperature: [] lo [] med [] hi   [x] Skin assessment post-treatment:  [x]intact []redness- no adverse reaction       []redness - adverse reaction:       37 min Therapeutic Exercise:  [x] See flow sheet: added standing TKE with GTB   Rationale: increase ROM, increase strength, improve coordination and improve balance to improve the patients ability to improve gait, working    0 min Manual Therapy:  NT sec full ROM   Rationale: decrease pain, increase ROM, increase tissue extensibility and decrease trigger points to improve gait, mobility           X min Patient Education: [x] Review HEP           Other Objective/Functional Measures:     Gait: mod antalgia, cognizant of inc toe out to improve quad recruitment during stance; habitual return    Pain Level (0-10 scale) post treatment: 5    ASSESSMENT/Changes in Function:   Patient able to demo improved gait pattern upon arrival. Cognizant of avoiding tibial IR with stance phase, but progressive return to avoid pattern and/or sec habitual pattern. Cont to hold ROM therex sec full mobility. Pt request IFC sec pain relief. Patient will continue to benefit from skilled PT services to modify and progress therapeutic interventions, address functional mobility deficits, address ROM deficits, address strength deficits, analyze and address soft tissue restrictions, analyze and cue movement patterns, analyze and modify body mechanics/ergonomics, assess and modify postural abnormalities and instruct in home and community integration to attain remaining goals. [x]  See Plan of Care  []  See progress note/recertification  []  See Discharge Summary         Progress towards goals / Updated goals: All goals reviewed and progressing appropriately as of 7/11/2018  Short Term Goals: To be accomplished in  1  weeks:  1.  Pt will be independent and compliant with HEP.  -Goal met; pt notes compliance (6/22/18)  Long Term Goals: To be accomplished in  8-12  treatments:  1.  Patient will increase FOTO score to 60/100 for indications of increased functional mobility.    2.  Patient will demo AROM 0-135 for transfers and stair negotiation   AAROM 0 to 130 (6/25/18)  3.  Patient will demo 4/5 knee extension strength for decrease risk of falling  Progress ing strength as able but poor tolerance today (7/5/18)  4.  Patient will amb 300 feet without AD or brace for progression to PLOF and amb as primary mode of locomotion  No AD, no brace with mod antalgic/abnormal gait (7/11/18)    PLAN  [x]  Upgrade activities as tolerated     [x]  Continue plan of care  []  Update interventions per flow sheet       []  Discharge due to:_  [x]  Other: progress to NMES NV as tolerated      Kyrie Rodriguez, PTA  7/11/2018

## 2018-07-13 ENCOUNTER — HOSPITAL ENCOUNTER (OUTPATIENT)
Dept: PHYSICAL THERAPY | Age: 23
Discharge: HOME | End: 2018-07-13
Payer: MEDICAID

## 2018-07-13 PROCEDURE — 97014 ELECTRIC STIMULATION THERAPY: CPT

## 2018-07-13 PROCEDURE — 97110 THERAPEUTIC EXERCISES: CPT

## 2018-07-13 NOTE — PROGRESS NOTES
PT DAILY TREATMENT NOTE     Patient Name: Martin Wong  Date:2018  : 1995  [x]  Patient  Verified  Payor: Jewel Shi / Plan: [facility] [address] [facility] CCCP / Product Type: Managed Care Medicaid /    In time: 9:32 am               Out time: 10:30 am  Total Treatment Time (min): 58  Visit #: 9 of     Treatment Area: Chronic pain of left knee [M25.562, G89.29]    SUBJECTIVE  Pain Level (0-10 scale): 7  Any medication changes, allergies to medications, adverse drug reactions, diagnosis change, or new procedure performed?: [x] No    [] Yes (see summary sheet for update)  Subjective functional status/changes:   [] No changes reported  \"My knee buckled twice on the way here today. \"    OBJECTIVE  Modality rationale: decrease edema, decrease inflammation and decrease pain to improve the patients ability to improve stability with gait    Min Type Additional Details   10 [x] Estim: []Att   [x]Unatt        []TENS instruct                  [x]IFC  []Premod   []NMES                     []Other:  []w/US   [x]w/ice   []w/heat  Position: semi-reclined  Location: (L) knee    []  Traction: [] Cervical       []Lumbar                       [] Prone          []Supine                       []Intermittent   []Continuous Lbs:  [] before manual  [] after manual    []  Ultrasound: []Continuous   [] Pulsed                           []1MHz   []3MHz Location:  W/cm2:    []  Iontophoresis with dexamethasone         Location: [] Take home patch   [] In clinic    []  Ice     []  heat  []  Ice massage Position:  Location:    []  Vasopneumatic Device Pressure:       [] lo [] med [] hi   Temperature: [] lo [] med [] hi   [x] Skin assessment post-treatment:  [x]intact []redness- no adverse reaction       []redness - adverse reaction:       48 min Therapeutic Exercise:  [x] See flow sheet: added bridges limited to 3 reps sec pain   Rationale: increase ROM, increase strength, improve coordination and improve balance to improve the patients ability to improve gait, working    0 min Manual Therapy:  NT sec full ROM   Rationale: decrease pain, increase ROM, increase tissue extensibility and decrease trigger points to improve gait, mobility           X min Patient Education: [x] Review HEP           Other Objective/Functional Measures:    100% bridge    Pain Level (0-10 scale) post treatment: 5    ASSESSMENT/Changes in Function:   Patient cont's with c/o buckling with community amb. Definite inconsistencies with therex participation, ie. no difficulty with leg press, but challenged and with c/o buckling with turning to side on mat. Patient will continue to benefit from skilled PT services to modify and progress therapeutic interventions, address functional mobility deficits, address ROM deficits, address strength deficits, analyze and address soft tissue restrictions, analyze and cue movement patterns, analyze and modify body mechanics/ergonomics, assess and modify postural abnormalities and instruct in home and community integration to attain remaining goals. [x]  See Plan of Care  []  See progress note/recertification  []  See Discharge Summary         Progress towards goals / Updated goals: All goals reviewed and progressing appropriately as of 7/13/2018  Short Term Goals: To be accomplished in  1  weeks:  1.  Pt will be independent and compliant with HEP.  -Goal met; pt notes compliance (6/22/18)  Long Term Goals: To be accomplished in  8-12  treatments:  1.  Patient will increase FOTO score to 60/100 for indications of increased functional mobility.    2.  Patient will demo AROM 0-135 for transfers and stair negotiation   AAROM 0 to 130 (6/25/18)  3.  Patient will demo 4/5 knee extension strength for decrease risk of falling  Progress ing strength as able but poor tolerance today (7/5/18)  4.  Patient will amb 300 feet without AD or brace for progression to PLOF and amb as primary mode of locomotion  No AD, no brace with mod antalgic/abnormal gait (7/11/18)    PLAN  [x]  Upgrade activities as tolerated     [x]  Continue plan of care  []  Update interventions per flow sheet       []  Discharge due to:_  []  Other:_    Kisha Mooney, PTA  7/13/2018

## 2018-07-16 ENCOUNTER — HOSPITAL ENCOUNTER (OUTPATIENT)
Dept: PHYSICAL THERAPY | Age: 23
Discharge: HOME | End: 2018-07-16
Payer: MEDICAID

## 2018-07-16 PROCEDURE — 97110 THERAPEUTIC EXERCISES: CPT

## 2018-07-16 PROCEDURE — 97014 ELECTRIC STIMULATION THERAPY: CPT

## 2018-07-16 NOTE — PROGRESS NOTES
PT DAILY TREATMENT NOTE     Patient Name: Jimena Garcia  Date:2018  : 1995  [x]  Patient  Verified  Payor: [facility] MEDICAID / Plan: [facility] [address] [facility] CCCP / Product Type: Managed Care Medicaid /    In time: 11:00 am               Out time: 11:57 am  Total Treatment Time (min): 57  Visit #: 10 of     Treatment Area: Chronic pain of left knee [M25.562, G89.29]    SUBJECTIVE  Pain Level (0-10 scale): 5  Any medication changes, allergies to medications, adverse drug reactions, diagnosis change, or new procedure performed?: [x] No    [] Yes (see summary sheet for update)  Subjective functional status/changes:   [] No changes reported  \"A lot better today. I am pretty happy. \"    OBJECTIVE  Modality rationale: decrease edema, decrease inflammation and decrease pain to improve the patients ability to improve stability with gait    Min Type Additional Details   10 [x] Estim: []Att   [x]Unatt        []TENS instruct                  [x]IFC  []Premod   []NMES                     []Other:  []w/US   [x]w/ice   []w/heat  Position: semi-reclined  Location: (L) knee    []  Traction: [] Cervical       []Lumbar                       [] Prone          []Supine                       []Intermittent   []Continuous Lbs:  [] before manual  [] after manual    []  Ultrasound: []Continuous   [] Pulsed                           []1MHz   []3MHz Location:  W/cm2:    []  Iontophoresis with dexamethasone         Location: [] Take home patch   [] In clinic    []  Ice     []  heat  []  Ice massage Position:  Location:    []  Vasopneumatic Device Pressure:       [] lo [] med [] hi   Temperature: [] lo [] med [] hi   [x] Skin assessment post-treatment:  [x]intact []redness- no adverse reaction       []redness - adverse reaction:       47 min Therapeutic Exercise:  [x] See flow sheet: added SR EO to improve balance   Rationale: increase ROM, increase strength, improve coordination and improve balance to improve the patients ability to improve gait, working    0 min Manual Therapy:  NT sec full ROM   Rationale: decrease pain, increase ROM, increase tissue extensibility and decrease trigger points to improve gait, mobility           X min Patient Education: [x] Review HEP           Other Objective/Functional Measures:    100% bridge, 15 reps   SR: (L) 18 seconds, (R) 20 seconds    Pain Level (0-10 scale) post treatment: 3    ASSESSMENT/Changes in Function:   Improved gait quality today with min antalgia. No c/o buckling with therex progression in clinic reported. Patient challenged with static balance therex, but able to demo 18 seconds without LOB. Patient will continue to benefit from skilled PT services to modify and progress therapeutic interventions, address functional mobility deficits, address ROM deficits, address strength deficits, analyze and address soft tissue restrictions, analyze and cue movement patterns, analyze and modify body mechanics/ergonomics, assess and modify postural abnormalities and instruct in home and community integration to attain remaining goals. [x]  See Plan of Care  []  See progress note/recertification  []  See Discharge Summary         Progress towards goals / Updated goals: All goals reviewed and progressing appropriately as of 7/16/2018  Short Term Goals: To be accomplished in  1  weeks:  1.  Pt will be independent and compliant with HEP.  -Goal met; pt notes compliance (6/22/18)  Long Term Goals: To be accomplished in  8-12  treatments:  1.  Patient will increase FOTO score to 60/100 for indications of increased functional mobility.    2.  Patient will demo AROM 0-135 for transfers and stair negotiation   AAROM 0 to 130 (6/25/18)  3.  Patient will demo 4/5 knee extension strength for decrease risk of falling  Progress ing strength as able but poor tolerance today (7/5/18)  4.  Patient will amb 300 feet without AD or brace for progression to PLOF and amb as primary mode of locomotion -Goal progressing; min antalgia with gait today, no AD or brace (7/16/18)    PLAN  [x]  Upgrade activities as tolerated     [x]  Continue plan of care  []  Update interventions per flow sheet       []  Discharge due to:_  []  Other:_    Diann Duron, PTA  7/16/2018

## 2018-07-18 ENCOUNTER — HOSPITAL ENCOUNTER (OUTPATIENT)
Dept: PHYSICAL THERAPY | Age: 23
Discharge: HOME | End: 2018-07-18
Payer: MEDICAID

## 2018-07-18 PROCEDURE — 97110 THERAPEUTIC EXERCISES: CPT

## 2018-07-18 PROCEDURE — 97014 ELECTRIC STIMULATION THERAPY: CPT

## 2018-07-18 NOTE — PROGRESS NOTES
PT DAILY TREATMENT NOTE     Patient Name: Tita Beck  Date:2018  : 1995  [x]  Patient  Verified  Payor: [facility] MEDICAID / Plan: [facility] [address] [facility] CCCP / Product Type: Managed Care Medicaid /    In time: 10:30 am              Out time: 11:35 am  Total Treatment Time (min): 65  Visit #: 11 of     Treatment Area: Chronic pain of left knee [M25.562, G89.29]    SUBJECTIVE  Pain Level (0-10 scale): 7  Any medication changes, allergies to medications, adverse drug reactions, diagnosis change, or new procedure performed?: [x] No    [] Yes (see summary sheet for update)  Subjective functional status/changes:   [] No changes reported  \"I slept in the hospital last night. They thought I had a seizure, but I didn't. \"    OBJECTIVE  Modality rationale: decrease edema, decrease inflammation and decrease pain to improve the patients ability to improve stability with gait    Min Type Additional Details   10 [x] Estim: []Att   [x]Unatt        []TENS instruct                  [x]IFC  []Premod   []NMES                     []Other:  []w/US   [x]w/ice   []w/heat  Position: semi-reclined  Location: (L) knee    []  Traction: [] Cervical       []Lumbar                       [] Prone          []Supine                       []Intermittent   []Continuous Lbs:  [] before manual  [] after manual    []  Ultrasound: []Continuous   [] Pulsed                           []1MHz   []3MHz Location:  W/cm2:    []  Iontophoresis with dexamethasone         Location: [] Take home patch   [] In clinic    []  Ice     []  heat  []  Ice massage Position:  Location:    []  Vasopneumatic Device Pressure:       [] lo [] med [] hi   Temperature: [] lo [] med [] hi   [x] Skin assessment post-treatment:  [x]intact []redness- no adverse reaction       []redness - adverse reaction:       55 min Therapeutic Exercise:  [x] See flow sheet: initiated recumbent bike to challenge strength in functional ROM; added clam series; longsit calf stretch with belt   Rationale: increase ROM, increase strength, improve coordination and improve balance to improve the patients ability to improve gait, working    0 min Manual Therapy:  NT sec full ROM   Rationale: decrease pain, increase ROM, increase tissue extensibility and decrease trigger points to improve gait, mobility           X min Patient Education: [x] Review HEP           Other Objective/Functional Measures:    Foam SR EO: (L) 27 seconds, (R) 22 seconds       Pt's goal for D/C: leg press # (pt's weight); balance 30 seconds    Pain Level (0-10 scale) post treatment: 0    ASSESSMENT/Changes in Function:   Good mini squat position and form/mechanics. Balance steadily improving with static challenges. Gait remains with min/mod antalgia with intermittent normalization in the clinic. Full ROM and revolution on recumbent bike without outward signs of difficulty, inc mirela. Patient will continue to benefit from skilled PT services to modify and progress therapeutic interventions, address functional mobility deficits, address ROM deficits, address strength deficits, analyze and address soft tissue restrictions, analyze and cue movement patterns, analyze and modify body mechanics/ergonomics, assess and modify postural abnormalities and instruct in home and community integration to attain remaining goals. [x]  See Plan of Care  []  See progress note/recertification  []  See Discharge Summary         Progress towards goals / Updated goals: All goals reviewed and progressing appropriately as of 7/18/2018  Short Term Goals: To be accomplished in  1  weeks:  1.  Pt will be independent and compliant with HEP.  -Goal met; pt notes compliance (6/22/18)  Long Term Goals: To be accomplished in  8-12  treatments:  1.  Patient will increase FOTO score to 60/100 for indications of increased functional mobility.    2.  Patient will demo AROM 0-135 for transfers and stair negotiation   AAROM 0 to 130 (6/25/18)  3.  Patient will demo 4/5 knee extension strength for decrease risk of falling  Progress ing strength as able but poor tolerance today (7/5/18)  4.  Patient will amb 300 feet without AD or brace for progression to PLOF and amb as primary mode of locomotion  -Goal progressing; min antalgia with gait today, no AD or brace (7/16/18)    PLAN  [x]  Upgrade activities as tolerated     [x]  Continue plan of care  []  Update interventions per flow sheet       []  Discharge due to:_  [x]  Other: assess goals for D/C JOHN Reid, PTA  7/18/2018

## 2018-07-20 ENCOUNTER — HOSPITAL ENCOUNTER (OUTPATIENT)
Dept: PHYSICAL THERAPY | Age: 23
Discharge: HOME | End: 2018-07-20
Payer: MEDICAID

## 2018-07-20 PROCEDURE — 97110 THERAPEUTIC EXERCISES: CPT

## 2018-07-20 PROCEDURE — 97014 ELECTRIC STIMULATION THERAPY: CPT

## 2018-07-20 NOTE — PROGRESS NOTES
PT DAILY TREATMENT NOTE     Patient Name: Yamil Borrego  Date:2018  : 1995  [x]  Patient  Verified  Payor: Roberta Chau / Plan: [facility] [address] [facility] CCCP / Product Type: Managed Care Medicaid /    In time: 10:30 am              Out time: 11:30 am  Total Treatment Time (min): 60  Visit #: 12 of     Treatment Area: Chronic pain of left knee [M25.562, G89.29]    SUBJECTIVE  Pain Level (0-10 scale): 2  Any medication changes, allergies to medications, adverse drug reactions, diagnosis change, or new procedure performed?: [x] No    [] Yes (see summary sheet for update)  Subjective functional status/changes:   [] No changes reported  \"I feel so much better than the first day. \"    OBJECTIVE  Modality rationale: decrease edema, decrease inflammation and decrease pain to improve the patients ability to improve stability with gait    Min Type Additional Details   10 [x] Estim: []Att   [x]Unatt        []TENS instruct                  [x]IFC  []Premod   []NMES                     []Other:  []w/US   [x]w/ice   []w/heat  Position: semi-reclined  Location: (L) knee    []  Traction: [] Cervical       []Lumbar                       [] Prone          []Supine                       []Intermittent   []Continuous Lbs:  [] before manual  [] after manual    []  Ultrasound: []Continuous   [] Pulsed                           []1MHz   []3MHz Location:  W/cm2:    []  Iontophoresis with dexamethasone         Location: [] Take home patch   [] In clinic    []  Ice     []  heat  []  Ice massage Position:  Location:    []  Vasopneumatic Device Pressure:       [] lo [] med [] hi   Temperature: [] lo [] med [] hi   [x] Skin assessment post-treatment:  [x]intact []redness- no adverse reaction       []redness - adverse reaction:       50 min Therapeutic Exercise:  [x] See flow sheet: assessed goals for D/C; minimal changes to therex sec D/C day   Rationale: increase ROM, increase strength, improve coordination and improve balance to improve the patients ability to improve gait, working            X min Patient Education: [x] Review HEP           Other Objective/Functional Measures:   Improvements: strength, gait quality, pain levels, walking tolerance up to several blocks, reduced buckling with sit<>stands, perceived balance  FOTO score: 42/100 (at last assessment, 26/100)  (L) knee AROM: WNL with soft-tissue approximation  (-) FAITH  Core strength: 100% bridge  (L) hip strength: flex 5/5, abd 4+/5, ext 4+/5  (L) knee strength: flex 5/5, ext 5/5 (no quad lag with SLR)  Pain: (A) 2  Edema: symmetrical    Pain Level (0-10 scale) post treatment: 0    ASSESSMENT/Changes in Function:   See D/C. Patient will continue to benefit from skilled PT services to modify and progress therapeutic interventions, address functional mobility deficits, address ROM deficits, address strength deficits, analyze and address soft tissue restrictions, analyze and cue movement patterns, analyze and modify body mechanics/ergonomics, assess and modify postural abnormalities and instruct in home and community integration to attain remaining goals. [x]  See Discharge Summary         Progress towards goals / Updated goals:  All goals reviewed and progressing appropriately as of 7/20/2018  1.  Patient will increase FOTO score to 60/100 for indications of increased functional mobility. -Goal not met; inc to 42/100 from 26/100 at [address] will demo AROM 0-135 for transfers and stair negotiation. -Goal met; AROM WNL with soft-tissue approximation  3.  Patient will demo 4/5 knee extension strength for decrease risk of falling  -Goal met; 5/5 knee extension strength  4.  Patient will amb 300 feet without AD or brace for progression to PLOF and amb as primary mode of locomotion  -Goal met; pt reports ability to amb several blocks without AD or brace    PLAN   [x]  Discharge due to: patient meeting goals    Gaby Truong, PTA  7/20/2018

## 2018-07-23 NOTE — PROGRESS NOTES
[address] [facility] [address] [facility] AT [address]. [address] Quij[address] 56     Phone: [phone] Fax: [phone] SUMMARY  Patient Name: Yaw Vang : 1995   Treatment/Medical Diagnosis: Chronic pain of left knee [L73.195, G89.29]   Referral Source: Zuleika Adams DO     Date of Initial Visit: 18 Attended Visits: 12 Missed Visits: 1     SUMMARY OF TREATMENT  Pt is a 20y.o. year old male who presents with co L knee pain starting 7 mo ago after falling into a pot hole. Phillips Cooks went to ER approx 1 mo ago because of progressive worsening of pain and was told he had a meniscus tear. Subsequent imaging including xrays and MRI reveal no noted tear by DO. Treatment has consisted of the following: Therapeutic exercise, Therapeutic activities, Physical agent/modality, Gait/balance training, Manual therapy, and Patient education. CURRENT STATUS  Patient has made good progress in PT, as indicated by normalized AROM, increased knee/hip strength, and reduced ave pain levels. Patient states his balance and gait has improved since IE.  Assessment as follows:  Improvements: strength, gait quality, pain levels, walking tolerance up to several blocks, reduced buckling with sit<>stands, perceived balance  FOTO score: 42/100 (at last assessment, 26/100)  (L) knee AROM: WNL with soft-tissue approximation  (-) FAITH  Core strength: 100% bridge  (L) hip strength: flex 5/5, abd 4+/5, ext 4+/5  (L) knee strength: flex 5/5, ext 5/5 (no quad lag with SLR)  Pain: (A) 2  Edema: symmetrical LE girth @ jt line, 3 inches below and above mid-patella    Goal/Measure of Progress:  1.  Patient will increase FOTO score to 60/100 for indications of increased functional mobility. -Goal not met; inc to 42/100 from 26/100 at IE   2.  Patient will demo AROM 0-135 for transfers and stair negotiation. -Goal met; AROM WNL with soft-tissue approximation  3.  Patient will demo 4/5 knee extension strength for decrease risk of falling  -Goal met; 5/5 knee extension strength  4.  Patient will amb 300 feet without AD or brace for progression to PLOF and amb as primary mode of locomotion  -Goal met; pt reports ability to amb several blocks without AD or brace    Home exercise program established on initial evaluation and progressed as patient is able to address deficits. Patient now has all of the tools to continue with progress per HEP. RECOMMENDATIONS  Discontinue therapy. Progressing towards or have reached established goals. If you have any questions/comments please contact us directly at [phone]. Thank you for allowing us to assist in the care of your patient. LPTA Signature: Rah Lara PTA Date: 7/23/18   Therapist Signature: Roxy Edward DPT  Time: 10:22 AM     NOTE TO PHYSICIAN:    To ensure we are able to process the patient's encounter and avoid risk of your patient   receiving a bill for our services, please sign and return this discharge summary by 8/21/18. Thank you!      ___ I have read the above report and request that my patient be discharged from therapy.      Physician Signature:        Date:       Time:

## 2018-08-13 ENCOUNTER — OFFICE VISIT (OUTPATIENT)
Dept: ORTHOPEDIC SURGERY | Age: 23
End: 2018-08-13

## 2018-08-13 VITALS
RESPIRATION RATE: 15 BRPM | TEMPERATURE: 98.1 F | HEART RATE: 98 BPM | SYSTOLIC BLOOD PRESSURE: 106 MMHG | WEIGHT: 183.6 LBS | DIASTOLIC BLOOD PRESSURE: 64 MMHG | BODY MASS INDEX: 25.7 KG/M2 | HEIGHT: 71 IN

## 2018-08-13 DIAGNOSIS — M76.32 IT BAND SYNDROME, LEFT: ICD-10-CM

## 2018-08-13 DIAGNOSIS — M22.2X2 PATELLOFEMORAL DISORDER, LEFT: Primary | ICD-10-CM

## 2018-08-13 RX ORDER — BENZTROPINE MESYLATE 0.5 MG/1
1 TABLET ORAL 2 TIMES DAILY
Status: ON HOLD | COMMUNITY
End: 2019-08-26 | Stop reason: SDUPTHER

## 2018-08-13 RX ORDER — QUETIAPINE FUMARATE 400 MG/1
400 TABLET, FILM COATED ORAL 2 TIMES DAILY
COMMUNITY
End: 2019-08-26

## 2018-08-13 RX ORDER — PREDNISONE 20 MG/1
TABLET ORAL
Qty: 28 TAB | Refills: 0 | Status: SHIPPED | OUTPATIENT
Start: 2018-08-31 | End: 2019-08-26

## 2018-08-13 RX ORDER — LITHIUM CARBONATE 300 MG
300 TABLET ORAL 3 TIMES DAILY
COMMUNITY
End: 2019-08-26

## 2018-08-13 NOTE — PATIENT INSTRUCTIONS
[email or web address]    Search YouTube for my channel:    Dr. Simona Caldwell    Runner's Knee    IT Band             Iliotibial Band Syndrome: Exercises  Your Care Instructions  Here are some examples of typical rehabilitation exercises for your condition. Start each exercise slowly. Ease off the exercise if you start to have pain. Your doctor or physical therapist will tell you when you can start these exercises and which ones will work best for you. How to do the exercises  Iliotibial band stretch    1. Lean sideways against a wall. If you are not steady on your feet, hold on to a chair or counter. 2. Stand on the leg with the affected hip, with that leg close to the wall. Then cross your other leg in front of it. 3. Let your affected hip drop out to the side of your body and against the wall. Then lean away from your affected hip until you feel a stretch. 4. Hold the stretch for 15 to 30 seconds. 5. Repeat 2 to 4 times. Piriformis stretch    1. Lie on your back with your legs straight. 2. Lift your affected leg and bend your knee. With your opposite hand, reach across your body, and then gently pull your knee toward your opposite shoulder. 3. Hold the stretch for 15 to 30 seconds. 4. Repeat 2 to 4 times. Hamstring wall stretch    1. Lie on your back in a doorway, with your good leg through the open door. 2. Slide your affected leg up the wall to straighten your knee. You should feel a gentle stretch down the back of your leg. 1. Do not arch your back. 2. Do not bend either knee. 3. Keep one heel touching the floor and the other heel touching the wall. Do not point your toes. 3. Hold the stretch for at least 1 minute to begin. Then try to lengthen the time you hold the stretch to as long as 6 minutes. 4. Repeat 2 to 4 times. 5. If you do not have a place to do this exercise in a doorway, there is another way to do it:  6.  Lie on your back, and bend the knee of your affected leg. 7. Loop a towel under the ball and toes of that foot, and hold the ends of the towel in your hands. 8. Straighten your knee, and slowly pull back on the towel. You should feel a gentle stretch down the back of your leg. 9. Hold the stretch for 15 to 30 seconds. Or even better, hold the stretch for 1 minute if you can. 10. Repeat 2 to 4 times. Follow-up care is a key part of your treatment and safety. Be sure to make and go to all appointments, and call your doctor if you are having problems. It's also a good idea to know your test results and keep a list of the medicines you take. Where can you learn more? Go to http://ananya-clarita.info/. Enter P252 in the search box to learn more about \"Iliotibial Band Syndrome: Exercises. \"  Current as of: November 29, 2017  Content Version: 11.7  © 7822-2398 [employer]. Care instructions adapted under license by [employer] (which disclaims liability or warranty for this information). If you have questions about a medical condition or this instruction, always ask your healthcare professional. [place] [address] any warranty or liability for your use of this information. Patellofemoral Pain Syndrome (Runner's Knee): Exercises  Your Care Instructions  Here are some examples of typical rehabilitation exercises for your condition. Start each exercise slowly. Ease off the exercise if you start to have pain. Your doctor or physical therapist will tell you when you can start these exercises and which ones will work best for you. How to do the exercises  Calf wall stretch    6. Stand facing a wall with your hands on the wall at about eye level. Put your affected leg about a step behind your other leg.   7. Keeping your back leg straight and your back heel on the floor, bend your front knee and gently bring your hip and chest toward the wall until you feel a stretch in the calf of your back leg.  8. Hold the stretch for at least 15 to 30 seconds. 9. Repeat 2 to 4 times. 10. Repeat steps 1 through 4, but this time keep your back knee bent. Quadriceps stretch    5. If you are not steady on your feet, hold on to a chair, counter, or wall. 6. Bend your affected leg, and reach behind you to grab the front of your foot or ankle with the hand on the same side. For example, if you are stretching your right leg, use your right hand. 7. Keeping your knees next to each other, pull your foot toward your buttock until you feel a gentle stretch across the front of your hip and down the front of your thigh. Your knee should be pointed directly to the ground, and not out to the side. 8. Hold the stretch for at least 15 to 30 seconds. 9. Repeat 2 to 4 times. Hamstring wall stretch    11. Lie on your back in a doorway, with your good leg through the open door. 12. Slide your affected leg up the wall to straighten your knee. You should feel a gentle stretch down the back of your leg. 1. Do not arch your back. 2. Do not bend either knee. 3. Keep one heel touching the floor and the other heel touching the wall. Do not point your toes. 13. Hold the stretch for at least 1 minute. Then over time, try to lengthen the time you hold the stretch to as long as 6 minutes. 14. Repeat 2 to 4 times. 15. If you do not have a place to do this exercise in a doorway, there is another way to do it:  16. Lie on your back, and bend your affected leg. 17. Loop a towel under the ball and toes of that foot, and hold the ends of the towel in your hands. 18. Straighten your knee, and slowly pull back on the towel. You should feel a gentle stretch down the back of your leg. 19. Hold the stretch for at least 15 to 30 seconds. Or even better, hold the stretch for 1 minute if you can. 20. Repeat 2 to 4 times. Quad sets    1. Sit with your affected leg straight and supported on the floor or a firm bed.  Place a small, rolled-up towel under your affected knee. Your other leg should be bent, with that foot flat on the floor. 2. Tighten the thigh muscles of your affected leg by pressing the back of your knee down into the towel. 3. Hold for about 6 seconds, then rest for up to 10 seconds. 4. Repeat 8 to 12 times. Straight-leg raises to the front    1. Lie on your back with your good knee bent so that your foot rests flat on the floor. Your affected leg should be straight. Make sure that your low back has a normal curve. You should be able to slip your hand in between the floor and the small of your back, with your palm touching the floor and your back touching the back of your hand. 2. Tighten the thigh muscles in your affected leg by pressing the back of your knee flat down to the floor. Hold your knee straight. 3. Keeping the thigh muscles tight and your leg straight, lift your affected leg up so that your heel is about 12 inches off the floor. 4. Hold for about 6 seconds, then lower your leg slowly. Rest for up to 10 seconds between repetitions. 5. Repeat 8 to 12 times. Straight-leg raises to the back    1. Lie on your stomach, and lift your leg straight up behind you (toward the ceiling). 2. Lift your toes about 6 inches off the floor, hold for about 6 seconds, then lower slowly. 3. Do 8 to 12 repetitions. Wall slide with ball squeeze    1. Stand with your back against a wall and with your feet about shoulder-width apart. Your feet should be about 12 inches away from the wall. 2. Put a ball about the size of a soccer ball between your knees. Then slowly slide down the wall until your knees are bent about 20 to 30 degrees. 3. Tighten your thigh muscles by squeezing the ball between your knees. Hold that position for about 10 seconds, then stop squeezing. Rest for up to 10 seconds between repetitions. 4. Repeat 8 to 12 times. Follow-up care is a key part of your treatment and safety.  Be sure to make and go to all appointments, and call your doctor if you are having problems. It's also a good idea to know your test results and keep a list of the medicines you take. Where can you learn more? Go to http://ananya-clarita.info/. Enter A404 in the search box to learn more about \"Patellofemoral Pain Syndrome (Runner's Knee): Exercises. \"  Current as of: November 29, 2017  Content Version: 11.7  © 1265-1982 [employer]. Care instructions adapted under license by [employer] (which disclaims liability or warranty for this information). If you have questions about a medical condition or this instruction, always ask your healthcare professional. [address] any warranty or liability for your use of this information.

## 2018-08-13 NOTE — MR AVS SNAPSHOT
Angy Huizar 
 
 
 [address] [facility], [address] [facility] [address] 
[phone] Patient: Heena Farias MRN: [id number] :1995 Visit Information Date & Time Provider Department Dept. Phone Encounter #  
 2018 11:20 AM Arias Pratt, [address] [id number] [phone] [id number] Follow-up Instructions Return in about 7 weeks (around 10/1/2018) for Left, PFS. Upcoming Health Maintenance Date Due Pneumococcal 19-64 Medium Risk (1 of 1 - PPSV23) 2013 DTaP/Tdap/Td series (1 - Tdap) 2015 Influenza Age 5 to Adult 2018 Allergies as of 2018  Review Complete On: 2018 By: Arias Pratt DO No Known Allergies Current Immunizations  Never Reviewed No immunizations on file. Not reviewed this visit You Were Diagnosed With   
  
 Codes Comments Patellofemoral disorder, left    -  Primary ICD-10-CM: M22.2X2 ICD-9-CM: 719.96 It band syndrome, left     ICD-10-CM: M76.32 
ICD-9-CM: 728.89 Vitals BP Pulse Temp Resp Height(growth percentile) Weight(growth percentile) 106/64 98 98.1 °F (36.7 °C) 15 5' 11\" (1.803 m) 183 lb 9.6 oz (83.3 kg) BMI Smoking Status 25.61 kg/m2 Current Every Day Smoker Vitals History BMI and BSA Data Body Mass Index Body Surface Area  
 25.61 kg/m 2 2.04 m 2 Your Updated Medication List  
  
   
This list is accurate as of 18 11:45 AM.  Always use your most recent med list.  
  
  
  
  
 benztropine 0.5 mg tablet Commonly known as:  COGENTIN Take 1 mg by mouth two (2) times a day. lithium carbonate 300 mg tablet Take 300 mg by mouth three (3) times daily. naproxen 500 mg tablet Commonly known as:  NAPROSYN Take 1 Tab by mouth two (2) times daily (with meals). predniSONE 20 mg tablet Commonly known as:  Alberto Gelineau  
 Take 2 tabs in AM with food for 7 days then 1 tab in AM with food until gone Start taking on:  8/31/2018  
  
 risperiDONE 1 mg tablet Commonly known as:  RisperDAL Take 1 Tab by mouth two (2) times a day. Indications: mood stabilization SEROquel 400 mg tablet Generic drug:  QUEtiapine Take 400 mg by mouth two (2) times a day. traZODone 100 mg tablet Commonly known as:  [place] Take 100 mg by mouth nightly. Prescriptions Printed Refills  
 predniSONE (DELTASONE) 20 mg tablet 0 Starting on: 8/31/2018 Sig: Take 2 tabs in AM with food for 7 days then 1 tab in AM with food until gone Class: Print Follow-up Instructions Return in about 7 weeks (around 10/1/2018) for Left, PFS. Patient Instructions   
Soha Search YouTube for my channel: 
 
Dr. Victor Manuel Hernandez IT Band Iliotibial Band Syndrome: Exercises Your Care Instructions Here are some examples of typical rehabilitation exercises for your condition. Start each exercise slowly. Ease off the exercise if you start to have pain. Your doctor or physical therapist will tell you when you can start these exercises and which ones will work best for you. How to do the exercises Iliotibial band stretch 1. Lean sideways against a wall. If you are not steady on your feet, hold on to a chair or counter. 2. Stand on the leg with the affected hip, with that leg close to the wall. Then cross your other leg in front of it. 3. Let your affected hip drop out to the side of your body and against the wall. Then lean away from your affected hip until you feel a stretch. 4. Hold the stretch for 15 to 30 seconds. 5. Repeat 2 to 4 times. Piriformis stretch 1. Lie on your back with your legs straight. 2. Lift your affected leg and bend your knee.  With your opposite hand, reach across your body, and then gently pull your knee toward your opposite shoulder. 3. Hold the stretch for 15 to 30 seconds. 4. Repeat 2 to 4 times. Hamstring wall stretch 1. Lie on your back in a doorway, with your good leg through the open door. 2. Slide your affected leg up the wall to straighten your knee. You should feel a gentle stretch down the back of your leg. 1. Do not arch your back. 2. Do not bend either knee. 3. Keep one heel touching the floor and the other heel touching the wall. Do not point your toes. 3. Hold the stretch for at least 1 minute to begin. Then try to lengthen the time you hold the stretch to as long as 6 minutes. 4. Repeat 2 to 4 times. 5. If you do not have a place to do this exercise in a doorway, there is another way to do it: 6. Lie on your back, and bend the knee of your affected leg. 7. Loop a towel under the ball and toes of that foot, and hold the ends of the towel in your hands. 8. Straighten your knee, and slowly pull back on the towel. You should feel a gentle stretch down the back of your leg. 9. Hold the stretch for 15 to 30 seconds. Or even better, hold the stretch for 1 minute if you can. 10. Repeat 2 to 4 times. Follow-up care is a key part of your treatment and safety. Be sure to make and go to all appointments, and call your doctor if you are having problems. It's also a good idea to know your test results and keep a list of the medicines you take. Where can you learn more? Go to http://ananya-clarita.info/. Enter P252 in the search box to learn more about \"Iliotibial Band Syndrome: Exercises. \" Current as of: November 29, 2017 Content Version: 11.7 © 8712-4627 [employer]. Care instructions adapted under license by [employer] (which disclaims liability or warranty for this information).  If you have questions about a medical condition or this instruction, always ask your healthcare professional. [place] [address] any warranty or liability for your use of this information. Patellofemoral Pain Syndrome (Runner's Knee): Exercises Your Care Instructions Here are some examples of typical rehabilitation exercises for your condition. Start each exercise slowly. Ease off the exercise if you start to have pain. Your doctor or physical therapist will tell you when you can start these exercises and which ones will work best for you. How to do the exercises Calf wall stretch 6. Stand facing a wall with your hands on the wall at about eye level. Put your affected leg about a step behind your other leg. 7. Keeping your back leg straight and your back heel on the floor, bend your front knee and gently bring your hip and chest toward the wall until you feel a stretch in the calf of your back leg. 8. Hold the stretch for at least 15 to 30 seconds. 9. Repeat 2 to 4 times. 10. Repeat steps 1 through 4, but this time keep your back knee bent. Quadriceps stretch 5. If you are not steady on your feet, hold on to a chair, counter, or wall. 6. Bend your affected leg, and reach behind you to grab the front of your foot or ankle with the hand on the same side. For example, if you are stretching your right leg, use your right hand. 7. Keeping your knees next to each other, pull your foot toward your buttock until you feel a gentle stretch across the front of your hip and down the front of your thigh. Your knee should be pointed directly to the ground, and not out to the side. 8. Hold the stretch for at least 15 to 30 seconds. 9. Repeat 2 to 4 times. Hamstring wall stretch 11. Lie on your back in a doorway, with your good leg through the open door. 12. Slide your affected leg up the wall to straighten your knee. You should feel a gentle stretch down the back of your leg. 1. Do not arch your back. 2. Do not bend either knee. 3. Keep one heel touching the floor and the other heel touching the wall. Do not point your toes. 13. Hold the stretch for at least 1 minute. Then over time, try to lengthen the time you hold the stretch to as long as 6 minutes. 14. Repeat 2 to 4 times. 15. If you do not have a place to do this exercise in a doorway, there is another way to do it: 
16. Lie on your back, and bend your affected leg. 17. Loop a towel under the ball and toes of that foot, and hold the ends of the towel in your hands. 18. Straighten your knee, and slowly pull back on the towel. You should feel a gentle stretch down the back of your leg. 19. Hold the stretch for at least 15 to 30 seconds. Or even better, hold the stretch for 1 minute if you can. 20. Repeat 2 to 4 times. [facility] 1. Sit with your affected leg straight and supported on the floor or a firm bed. Place a small, rolled-up towel under your affected knee. Your other leg should be bent, with that foot flat on the floor. 2. Tighten the thigh muscles of your affected leg by pressing the back of your knee down into the towel. 3. Hold for about 6 seconds, then rest for up to 10 seconds. 4. Repeat 8 to 12 times. Straight-leg raises to the front 1. Lie on your back with your good knee bent so that your foot rests flat on the floor. Your affected leg should be straight. Make sure that your low back has a normal curve. You should be able to slip your hand in between the floor and the small of your back, with your palm touching the floor and your back touching the back of your hand. 2. Tighten the thigh muscles in your affected leg by pressing the back of your knee flat down to the floor. Hold your knee straight. 3. Keeping the thigh muscles tight and your leg straight, lift your affected leg up so that your heel is about 12 inches off the floor. 4. Hold for about 6 seconds, then lower your leg slowly. Rest for up to 10 seconds between repetitions. 5. Repeat 8 to 12 times. Straight-leg raises to the back 1. Lie on your stomach, and lift your leg straight up behind you (toward the ceiling). 2. Lift your toes about 6 inches off the floor, hold for about 6 seconds, then lower slowly. 3. Do 8 to 12 repetitions. Wall slide with ball squeeze 1. Stand with your back against a wall and with your feet about shoulder-width apart. Your feet should be about 12 inches away from the wall. 2. Put a ball about the size of a soccer ball between your knees. Then slowly slide down the wall until your knees are bent about 20 to 30 degrees. 3. Tighten your thigh muscles by squeezing the ball between your knees. Hold that position for about 10 seconds, then stop squeezing. Rest for up to 10 seconds between repetitions. 4. Repeat 8 to 12 times. Follow-up care is a key part of your treatment and safety. Be sure to make and go to all appointments, and call your doctor if you are having problems. It's also a good idea to know your test results and keep a list of the medicines you take. Where can you learn more? Go to http://ananya-clarita.info/. Enter A404 in the search box to learn more about \"Patellofemoral Pain Syndrome (Runner's Knee): Exercises. \" Current as of: November 29, 2017 Content Version: 11.7 © 4932-9722 [employer]. Care instructions adapted under license by [employer] (which disclaims liability or warranty for this information). If you have questions about a medical condition or this instruction, always ask your healthcare professional. [place] [address] any warranty or liability for your use of this information. Introducing [facility] & [facility]! [employer] introduces [facility] [employer] [facility] patient portal. Now you can access parts of your medical record, email your doctor's office, and request medication refills online. 1. In your internet browser, go to [email or web address] 2. Click on the First Time User? Click Here link in the Sign In box. You will see the New Member Sign Up page. 3. Enter your AGELON ? Access Code exactly as it appears below. You will not need to use this code after youve completed the sign-up process. If you do not sign up before the expiration date, you must request a new code. · AGELON ? Access Code: [email or web address] Expires: 9/30/2018  2:17 PM 
 
4. Enter the last four digits of your Social Security Number (xxxx) and Date of Birth (mm/dd/yyyy) as indicated and click Submit. You will be taken to the next sign-up page. 5. Create a AGELON ? ID. This will be your AGELON ? login ID and cannot be changed, so think of one that is secure and easy to remember. 6. Create a AGELON ? password. You can change your password at any time. 7. Enter your Password Reset Question and Answer. This can be used at a later time if you forget your password. 8. Enter your e-mail address. You will receive e-mail notification when new information is available in [address] 9. Click Sign Up. You can now view and download portions of your medical record. 10. Click the Download Summary menu link to download a portable copy of your medical information. If you have questions, please visit the Frequently Asked Questions section of the AGELON ? website. Remember, AGELON ? is NOT to be used for urgent needs. For medical emergencies, dial 911. Now available from your iPhone and Android! Please provide this summary of care documentation to your next provider. Your primary care clinician is listed as Sky Honeycutt. If you have any questions after today's visit, please call [phone].

## 2018-08-13 NOTE — PROGRESS NOTES
HISTORY OF PRESENT ILLNESS    Flakita Sellers is a 20y.o. year old male comes in today to be evaluated and treated for: left knee pain    Adiel Rough a lot of issues with depression and contemplating suicide lately but has improved and has appt with psych in next week or so. Knee pain after giving out while walking 2 weeks ago and has been hurting 4/10. No swell. Doing HEP daily since last appt. Has been using critchs from prior. IMAGING: MRI left knee 5/16/18 report:  1. No evidence of ligamentous or meniscal injury or acute osseous pathology. 2. There is a small rounded loose body along the inferior medial margin of the  patella without clear donor site. 3. Mild localized edema in the prefemoral fat and deep to the distal IT  band/lateral retinaculum. Findings suggest mild inflammation/impingement,  possibly in the setting of iliotibial band friction syndrome. Clinical  correlation recommended. Social History     Social History    Marital status: SINGLE     Spouse name: N/A    Number of children: N/A    Years of education: N/A     Social History Main Topics    Smoking status: Current Every Day Smoker     Packs/day: 1.00     Years: 10.00    Smokeless tobacco: Never Used    Alcohol use No    Drug use: Yes     Special: Marijuana      Comment: history of abuse of crack    Sexual activity: Not Asked     Other Topics Concern    None     Social History Narrative     Current Outpatient Prescriptions   Medication Sig Dispense Refill    QUEtiapine (SEROQUEL) 400 mg tablet Take 400 mg by mouth two (2) times a day.  lithium carbonate 300 mg tablet Take 300 mg by mouth three (3) times daily.  benztropine (COGENTIN) 0.5 mg tablet Take 1 mg by mouth two (2) times a day.  risperiDONE (RISPERDAL) 1 mg tablet Take 1 Tab by mouth two (2) times a day. Indications: mood stabilization 60 Tab 0    traZODone (DESYREL) 100 mg tablet Take 100 mg by mouth nightly.       naproxen (NAPROSYN) 500 mg tablet Take 1 Tab by mouth two (2) times daily (with meals). 61 Tab 0     Past Medical History:   Diagnosis Date    B[facility] 1 [facility] [address] [address]     Intermittent explosive disorder     Psychiatric disorder     schitzophrenic    Schizophrenia, schizo-affective ([facility] [address]      Family History   Problem Relation Age of Onset    Diabetes Mother     Sleep Apnea Mother          ROS:  See HPI. No numb    Objective:  /64  Pulse 98  Temp 98.1 °F (36.7 °C)  Resp 15  Ht 5' 11\" (1.803 m)  Wt 183 lb 9.6 oz (83.3 kg)  BMI 25.61 kg/m2  GEN: Appears stated age in NAD. HEAD:  Normocephalic, atraumatic. NEURO:  Sensation intact light touch B/L lower extremities. MS:  LE Strength +5/5 bilateral .   left Knee:  1+ Effusion .  Lachman negative.  Valgus negative.  Varus negative.  negative joint line tenderness medial.  Johnson positive.  Posterior drawer negative.  Noble compression test negative.  Patellar apprehension negative.  Patellar facet  positive tender to palpation medial minimal crepitance right.  + TTP insertion IT band left + modified delphine test.  EXT: no clubbing/cyanosis.  no edema. Assessment/Plan:     ICD-10-CM ICD-9-CM    1. Patellofemoral disorder, left M22.2X2 719.96    2. It band syndrome, left M76.32 728.89        Patient verbalizes understanding of evaluation and plan. Will continue HEP for above and will hold printed Rx for prednisone as needs to detox for another 18 days or so. Will fill prednisone 8/31/18 if needed and plan follow up 4 weeks after for f/u.

## 2018-12-07 NOTE — ED NOTES
Pt states he was walking towards MRI and asked the nurse for a wheelchair. As he was getting in, his knee \"gave out\" and he fell. Pt previously tore ligament in knee, has been wearing knee immobilizer over the lower part of his leg since this morning because it slipped down. no rales/breath sounds equal/no rhonchi

## 2019-08-19 ENCOUNTER — HOSPITAL ENCOUNTER (EMERGENCY)
Age: 24
Discharge: OTHER INSTITUTION | End: 2019-08-20
Attending: EMERGENCY MEDICINE | Admitting: EMERGENCY MEDICINE
Payer: MEDICAID

## 2019-08-19 DIAGNOSIS — F25.9 SCHIZOAFFECTIVE DISORDER, UNSPECIFIED TYPE (HCC): ICD-10-CM

## 2019-08-19 DIAGNOSIS — R45.851 SUICIDAL IDEATION: Primary | ICD-10-CM

## 2019-08-19 LAB
AMPHET UR QL SCN: NEGATIVE
ANION GAP SERPL CALC-SCNC: 3 MMOL/L (ref 3–18)
BARBITURATES UR QL SCN: NEGATIVE
BASOPHILS # BLD: 0 K/UL (ref 0–0.1)
BASOPHILS NFR BLD: 1 % (ref 0–2)
BENZODIAZ UR QL: NEGATIVE
BUN SERPL-MCNC: 13 MG/DL (ref 7–18)
BUN/CREAT SERPL: 15 (ref 12–20)
CALCIUM SERPL-MCNC: 9.1 MG/DL (ref 8.5–10.1)
CANNABINOIDS UR QL SCN: POSITIVE
CHLORIDE SERPL-SCNC: 106 MMOL/L (ref 100–111)
CO2 SERPL-SCNC: 29 MMOL/L (ref 21–32)
COCAINE UR QL SCN: NEGATIVE
CREAT SERPL-MCNC: 0.86 MG/DL (ref 0.6–1.3)
DIFFERENTIAL METHOD BLD: ABNORMAL
EOSINOPHIL # BLD: 0.2 K/UL (ref 0–0.4)
EOSINOPHIL NFR BLD: 3 % (ref 0–5)
ERYTHROCYTE [DISTWIDTH] IN BLOOD BY AUTOMATED COUNT: 12.7 % (ref 11.6–14.5)
ETHANOL SERPL-MCNC: <3 MG/DL (ref 0–3)
GLUCOSE SERPL-MCNC: 90 MG/DL (ref 74–99)
HCT VFR BLD AUTO: 40.2 % (ref 36–48)
HDSCOM,HDSCOM: ABNORMAL
HGB BLD-MCNC: 13.6 G/DL (ref 13–16)
LYMPHOCYTES # BLD: 1.4 K/UL (ref 0.9–3.6)
LYMPHOCYTES NFR BLD: 23 % (ref 21–52)
MCH RBC QN AUTO: 30.3 PG (ref 24–34)
MCHC RBC AUTO-ENTMCNC: 33.8 G/DL (ref 31–37)
MCV RBC AUTO: 89.5 FL (ref 74–97)
METHADONE UR QL: NEGATIVE
MONOCYTES # BLD: 0.5 K/UL (ref 0.05–1.2)
MONOCYTES NFR BLD: 8 % (ref 3–10)
NEUTS SEG # BLD: 4 K/UL (ref 1.8–8)
NEUTS SEG NFR BLD: 65 % (ref 40–73)
OPIATES UR QL: NEGATIVE
PCP UR QL: NEGATIVE
PLATELET # BLD AUTO: 104 K/UL (ref 135–420)
PMV BLD AUTO: 9.9 FL (ref 9.2–11.8)
POTASSIUM SERPL-SCNC: 3.8 MMOL/L (ref 3.5–5.5)
RBC # BLD AUTO: 4.49 M/UL (ref 4.7–5.5)
SODIUM SERPL-SCNC: 138 MMOL/L (ref 136–145)
WBC # BLD AUTO: 6 K/UL (ref 4.6–13.2)

## 2019-08-19 PROCEDURE — 74011250637 HC RX REV CODE- 250/637: Performed by: EMERGENCY MEDICINE

## 2019-08-19 PROCEDURE — 80048 BASIC METABOLIC PNL TOTAL CA: CPT

## 2019-08-19 PROCEDURE — 80178 ASSAY OF LITHIUM: CPT

## 2019-08-19 PROCEDURE — 80307 DRUG TEST PRSMV CHEM ANLYZR: CPT

## 2019-08-19 PROCEDURE — 99285 EMERGENCY DEPT VISIT HI MDM: CPT

## 2019-08-19 PROCEDURE — 85025 COMPLETE CBC W/AUTO DIFF WBC: CPT

## 2019-08-19 RX ORDER — BENZTROPINE MESYLATE 1 MG/1
0.5 TABLET ORAL 2 TIMES DAILY
Status: DISCONTINUED | OUTPATIENT
Start: 2019-08-19 | End: 2019-08-20 | Stop reason: HOSPADM

## 2019-08-19 RX ORDER — TRAZODONE HYDROCHLORIDE 50 MG/1
100 TABLET ORAL
Status: DISCONTINUED | OUTPATIENT
Start: 2019-08-19 | End: 2019-08-19

## 2019-08-19 RX ORDER — QUETIAPINE FUMARATE 100 MG/1
400 TABLET, FILM COATED ORAL 2 TIMES DAILY
Status: DISCONTINUED | OUTPATIENT
Start: 2019-08-19 | End: 2019-08-19

## 2019-08-19 RX ORDER — LITHIUM CARBONATE 300 MG
300 TABLET ORAL 3 TIMES DAILY
Status: DISCONTINUED | OUTPATIENT
Start: 2019-08-19 | End: 2019-08-19

## 2019-08-19 RX ORDER — RISPERIDONE 1 MG/1
1 TABLET, FILM COATED ORAL 2 TIMES DAILY
Status: DISCONTINUED | OUTPATIENT
Start: 2019-08-19 | End: 2019-08-19

## 2019-08-19 RX ORDER — QUETIAPINE FUMARATE 100 MG/1
300 TABLET, FILM COATED ORAL
Status: DISCONTINUED | OUTPATIENT
Start: 2019-08-20 | End: 2019-08-20 | Stop reason: HOSPADM

## 2019-08-19 RX ADMIN — BENZTROPINE MESYLATE 0.5 MG: 1 TABLET ORAL at 22:31

## 2019-08-19 NOTE — ED PROVIDER NOTES
EMERGENCY DEPARTMENT HISTORY AND PHYSICAL EXAM    1:45 PM      Date: 8/19/2019  Patient Name: Demetrio Matias    History of Presenting Illness     Chief Complaint   Patient presents with    Suicidal         History Provided By: patient    Additional History (Context): Demetrio Matias is a 24 y.o. male presents with 2 weeks with suicidal thoughts, has been trying to ignore them for most of the 2 weeks but over the last 2 days these are more intense. Also auditory hallucinations telling him to kill himself. Carries the diagnoses of schizoaffective disorder and bipolar 1. He states he is compliant with his medications, has an adequate supply, but does not have established psychiatry care. He does smoke marijuana, denies other alcohol or drug use. twidox Animeeple PCP: Dejuan Colvin MD    Chief Complaint:   Duration:    Timing:    Location:   Quality:   Severity:   Modifying Factors:   Associated Symptoms:       Current Outpatient Medications   Medication Sig Dispense Refill    QUEtiapine (SEROQUEL) 400 mg tablet Take 400 mg by mouth two (2) times a day.  lithium carbonate 300 mg tablet Take 300 mg by mouth three (3) times daily.  benztropine (COGENTIN) 0.5 mg tablet Take 1 mg by mouth two (2) times a day.  predniSONE (DELTASONE) 20 mg tablet Take 2 tabs in AM with food for 7 days then 1 tab in AM with food until gone 28 Tab 0    traZODone (DESYREL) 100 mg tablet Take 100 mg by mouth nightly.  naproxen (NAPROSYN) 500 mg tablet Take 1 Tab by mouth two (2) times daily (with meals). 60 Tab 0    risperiDONE (RISPERDAL) 1 mg tablet Take 1 Tab by mouth two (2) times a day.  Indications: mood stabilization 60 Tab 0       Past History     Past Medical History:  Past Medical History:   Diagnosis Date    Bipolar 1 disorder ([address]     Intermittent explosive disorder     Psychiatric disorder     schitzophrenic    Schizophrenia, schizo-affective ([address]        Past Surgical History:  Past Surgical History: Procedure Laterality Date    HX TONSIL AND ADENOIDECTOMY         Family History:  Family History   Problem Relation Age of Onset    Diabetes Mother     Sleep Apnea Mother        Social History:  Social History     Tobacco Use    Smoking status: Current Every Day Smoker     Packs/day: 1.00     Years: 10.00     Pack years: 10.00    Smokeless tobacco: Never Used   Substance Use Topics    Alcohol use: No    Drug use: Yes     Types: Marijuana     Comment: history of abuse of crack       Allergies:  No Known Allergies      Review of Systems     Review of Systems   Constitutional: Negative for diaphoresis and fever. HENT: Negative for congestion and sore throat. Eyes: Negative for pain and itching. Respiratory: Negative for cough and shortness of breath. Cardiovascular: Negative for chest pain and palpitations. Gastrointestinal: Negative for abdominal pain and diarrhea. Endocrine: Negative for polydipsia and polyuria. Genitourinary: Negative for dysuria and hematuria. Musculoskeletal: Negative for arthralgias and myalgias. Skin: Negative for rash and wound. Neurological: Negative for seizures and syncope. Hematological: Does not bruise/bleed easily. Psychiatric/Behavioral: Positive for agitation, dysphoric mood and hallucinations. Physical Exam       Patient Vitals for the past 12 hrs:   Temp Pulse Resp BP SpO2   08/19/19 1318 98 °F (36.7 °C) 92 15 129/63 97 %       Physical Exam   Constitutional: He appears well-developed and well-nourished. HENT:   Head: Normocephalic and atraumatic. Eyes: Conjunctivae are normal. No scleral icterus. Neck: Normal range of motion. Neck supple. No JVD present. Cardiovascular: Normal rate, regular rhythm and intact distal pulses. Pulmonary/Chest: Effort normal. No respiratory distress. Musculoskeletal: Normal range of motion. Neurological: He is alert. Skin: Skin is warm and dry.    Psychiatric: Judgment normal.   Full range affect appears sad, mood is very depressed, thought content suicidal ideation. He does hear command hallucinations. Nursing note and vitals reviewed. Diagnostic Study Results   Labs -  No results found for this or any previous visit (from the past 12 hour(s)). Radiologic Studies -   No orders to display     No results found. Medications ordered:   Medications - No data to display      Medical Decision Making   Initial Medical Decision Making and DDx:     Most consistent with exacerbation of his several psychiatric disorders. Doubt metabolic cause for his symptoms. We will get medical clearance labs and have psychiatry see him. ED Course: Progress Notes, Reevaluation, and Consults:  ED Course as of Aug 20 1041   Mon Aug 19, 2019   1727 Medically. Clear calling psychiatry. No acute events thus far in the ER    [CB]      ED Course User Index  [CB] Burgess Steff MD         I am the first provider for this patient. I reviewed the vital signs, available nursing notes, past medical history, past surgical history, family history and social history. Patient Vitals for the past 12 hrs:   Temp Pulse Resp BP SpO2   08/19/19 1318 98 °F (36.7 °C) 92 15 129/63 97 %       Vital Signs-Reviewed the patient's vital signs. Pulse Oximetry Analysis, Cardiac Monitor, 12 lead ekg:      Interpreted by the EP. Records Reviewed: Nursing notes reviewed (Time of Review: 1:45 PM)    Procedures:   Critical Care Time:   Aspirin: (was aspirin given for stroke?)    Diagnosis     Clinical Impression: No diagnosis found. Disposition:       Follow-up Information    None          Patient's Medications   Start Taking    No medications on file   Continue Taking    BENZTROPINE (COGENTIN) 0.5 MG TABLET    Take 1 mg by mouth two (2) times a day. LITHIUM CARBONATE 300 MG TABLET    Take 300 mg by mouth three (3) times daily. NAPROXEN (NAPROSYN) 500 MG TABLET    Take 1 Tab by mouth two (2) times daily (with meals). PREDNISONE (DELTASONE) 20 MG TABLET    Take 2 tabs in AM with food for 7 days then 1 tab in AM with food until gone    QUETIAPINE (SEROQUEL) 400 MG TABLET    Take 400 mg by mouth two (2) times a day. RISPERIDONE (RISPERDAL) 1 MG TABLET    Take 1 Tab by mouth two (2) times a day. Indications: mood stabilization    TRAZODONE (DESYREL) 100 MG TABLET    Take 100 mg by mouth nightly.    These Medications have changed    No medications on file   Stop Taking    No medications on file     _______________________________    Notes:    Viktoria Downing MD using Dragon dictation      _______________________________

## 2019-08-19 NOTE — ED TRIAGE NOTES
Pt present w/  for SI x 2 days. Attempted to cut himself 2 weeks ago. (+) visual/auditory hallucinations. Denies ETOH usage. Admits to marijuana use. No thoughts of HI. Cooperative in triage.

## 2019-08-19 NOTE — ED NOTES
Vitals:  Patient Vitals for the past 12 hrs:   Temp Pulse Resp BP SpO2   08/19/19 2327  62  118/60 98 %   08/19/19 1318 98 °F (36.7 °C) 92 15 129/63 97 %         Medications ordered:   Medications   benztropine (COGENTIN) tablet 0.5 mg (0.5 mg Oral Given 8/19/19 2231)   QUEtiapine (SEROquel) tablet 300 mg (has no administration in time range)         Lab findings:  Recent Results (from the past 12 hour(s))   DRUG SCREEN, URINE    Collection Time: 08/19/19  1:30 PM   Result Value Ref Range    BENZODIAZEPINES NEGATIVE  NEG      BARBITURATES NEGATIVE  NEG      THC (TH-CANNABINOL) POSITIVE (A) NEG      OPIATES NEGATIVE  NEG      PCP(PHENCYCLIDINE) NEGATIVE  NEG      COCAINE NEGATIVE  NEG      AMPHETAMINES NEGATIVE  NEG      METHADONE NEGATIVE  NEG      HDSCOM (NOTE)    ETHYL ALCOHOL    Collection Time: 08/19/19  1:40 PM   Result Value Ref Range    ALCOHOL(ETHYL),SERUM <3 0 - 3 MG/DL   CBC WITH AUTOMATED DIFF    Collection Time: 08/19/19  1:40 PM   Result Value Ref Range    WBC 6.0 4.6 - 13.2 K/uL    RBC 4.49 (L) 4.70 - 5.50 M/uL    HGB 13.6 13.0 - 16.0 g/dL    HCT 40.2 36.0 - 48.0 %    MCV 89.5 74.0 - 97.0 FL    MCH 30.3 24.0 - 34.0 PG    MCHC 33.8 31.0 - 37.0 g/dL    RDW 12.7 11.6 - 14.5 %    PLATELET 549 (L) 191 - 420 K/uL    MPV 9.9 9.2 - 11.8 FL    NEUTROPHILS 65 40 - 73 %    LYMPHOCYTES 23 21 - 52 %    MONOCYTES 8 3 - 10 %    EOSINOPHILS 3 0 - 5 %    BASOPHILS 1 0 - 2 %    ABS. NEUTROPHILS 4.0 1.8 - 8.0 K/UL    ABS. LYMPHOCYTES 1.4 0.9 - 3.6 K/UL    ABS. MONOCYTES 0.5 0.05 - 1.2 K/UL    ABS. EOSINOPHILS 0.2 0.0 - 0.4 K/UL    ABS.  BASOPHILS 0.0 0.0 - 0.1 K/UL    DF AUTOMATED     METABOLIC PANEL, BASIC    Collection Time: 08/19/19  1:40 PM   Result Value Ref Range    Sodium 138 136 - 145 mmol/L    Potassium 3.8 3.5 - 5.5 mmol/L    Chloride 106 100 - 111 mmol/L    CO2 29 21 - 32 mmol/L    Anion gap 3 3.0 - 18 mmol/L    Glucose 90 74 - 99 mg/dL    BUN 13 7.0 - 18 MG/DL    Creatinine 0.86 0.6 - 1.3 MG/DL BUN/Creatinine ratio 15 12 - 20      GFR est AA >60 >60 ml/min/1.73m2    GFR est non-AA >60 >60 ml/min/1.73m2    Calcium 9.1 8.5 - 10.1 MG/DL       EKG interpretation by ED Physician:      X-Ray, CT or other radiology findings or impressions:  No orders to display       Progress notes, Consult notes or additional Procedure notes:   Patient was seen by tele-psychiatry following turned over from Dr. Leydi Salvador. Patient is voluntary for admission. I have written her medications as recommended. Will turn over to Dr. Jose Angel Rust at approximate 6 AM to follow-up on placement. Case management consult has also been placed    Reevaluation of patient:   stable with no acute medical condition that would prevent transfer to mental health facility    Disposition:  Diagnosis:   1. Suicidal ideation    2. Schizoaffective disorder, unspecified type ([facility]        Disposition: Pending    Follow-up Information    None           Patient's Medications   Start Taking    No medications on file   Continue Taking    BENZTROPINE (COGENTIN) 0.5 MG TABLET    Take 1 mg by mouth two (2) times a day. LITHIUM CARBONATE 300 MG TABLET    Take 300 mg by mouth three (3) times daily. NAPROXEN (NAPROSYN) 500 MG TABLET    Take 1 Tab by mouth two (2) times daily (with meals). PREDNISONE (DELTASONE) 20 MG TABLET    Take 2 tabs in AM with food for 7 days then 1 tab in AM with food until gone    QUETIAPINE (SEROQUEL) 400 MG TABLET    Take 400 mg by mouth two (2) times a day. RISPERIDONE (RISPERDAL) 1 MG TABLET    Take 1 Tab by mouth two (2) times a day. Indications: mood stabilization    TRAZODONE (DESYREL) 100 MG TABLET    Take 100 mg by mouth nightly.    These Medications have changed    No medications on file   Stop Taking    No medications on file

## 2019-08-19 NOTE — ED NOTES
Verbal shift change report given to Darrius Forde (oncoming nurse) by Leana Encarnacion (offgoing nurse). Report included the following information SBAR, ED Summary and MAR.

## 2019-08-20 ENCOUNTER — HOSPITAL ENCOUNTER (INPATIENT)
Age: 24
LOS: 6 days | Discharge: HOME | DRG: 750 | End: 2019-08-26
Attending: PSYCHIATRY & NEUROLOGY | Admitting: PSYCHIATRY & NEUROLOGY
Payer: MEDICAID

## 2019-08-20 VITALS
DIASTOLIC BLOOD PRESSURE: 66 MMHG | WEIGHT: 183 LBS | OXYGEN SATURATION: 98 % | TEMPERATURE: 97 F | RESPIRATION RATE: 20 BRPM | BODY MASS INDEX: 25.52 KG/M2 | SYSTOLIC BLOOD PRESSURE: 112 MMHG | HEART RATE: 61 BPM

## 2019-08-20 PROBLEM — F20.9 SCHIZOPHRENIA (HCC): Status: ACTIVE | Noted: 2019-08-20

## 2019-08-20 LAB — LITHIUM SERPL-SCNC: <0.2 MMOL/L (ref 0.6–1.2)

## 2019-08-20 PROCEDURE — 65220000003 HC RM SEMIPRIVATE PSYCH

## 2019-08-20 PROCEDURE — 74011250637 HC RX REV CODE- 250/637: Performed by: PSYCHIATRY & NEUROLOGY

## 2019-08-20 PROCEDURE — 74011250637 HC RX REV CODE- 250/637: Performed by: EMERGENCY MEDICINE

## 2019-08-20 RX ORDER — TRAZODONE HYDROCHLORIDE 50 MG/1
50 TABLET ORAL
Status: DISCONTINUED | OUTPATIENT
Start: 2019-08-20 | End: 2019-08-26 | Stop reason: HOSPADM

## 2019-08-20 RX ORDER — IBUPROFEN 400 MG/1
400 TABLET ORAL
Status: DISCONTINUED | OUTPATIENT
Start: 2019-08-20 | End: 2019-08-26 | Stop reason: HOSPADM

## 2019-08-20 RX ORDER — OLANZAPINE 15 MG/1
15 TABLET ORAL 2 TIMES DAILY
COMMUNITY
End: 2019-08-26

## 2019-08-20 RX ORDER — HALOPERIDOL 5 MG/ML
5 INJECTION INTRAMUSCULAR
Status: DISCONTINUED | OUTPATIENT
Start: 2019-08-20 | End: 2019-08-26 | Stop reason: HOSPADM

## 2019-08-20 RX ORDER — HYDROXYZINE PAMOATE 50 MG/1
50 CAPSULE ORAL
Status: DISCONTINUED | OUTPATIENT
Start: 2019-08-20 | End: 2019-08-26 | Stop reason: HOSPADM

## 2019-08-20 RX ORDER — HALOPERIDOL 5 MG/1
5 TABLET ORAL
Status: DISCONTINUED | OUTPATIENT
Start: 2019-08-20 | End: 2019-08-26 | Stop reason: HOSPADM

## 2019-08-20 RX ORDER — FLUOXETINE HYDROCHLORIDE 20 MG/1
CAPSULE ORAL DAILY
COMMUNITY
End: 2019-08-26

## 2019-08-20 RX ORDER — LORAZEPAM 2 MG/ML
1-2 INJECTION INTRAMUSCULAR
Status: DISCONTINUED | OUTPATIENT
Start: 2019-08-20 | End: 2019-08-26 | Stop reason: HOSPADM

## 2019-08-20 RX ADMIN — HYDROXYZINE PAMOATE 50 MG: 50 CAPSULE ORAL at 19:53

## 2019-08-20 RX ADMIN — BENZTROPINE MESYLATE 0.5 MG: 1 TABLET ORAL at 09:21

## 2019-08-20 RX ADMIN — TRAZODONE HYDROCHLORIDE 50 MG: 50 TABLET ORAL at 19:53

## 2019-08-20 RX ADMIN — HALOPERIDOL 5 MG: 5 TABLET ORAL at 19:53

## 2019-08-20 NOTE — PROGRESS NOTES
Referred to [place] and spoke with Cesar Tejeda. He states no bed currently but doctors are rounding now and hopefully have discharges.

## 2019-08-20 NOTE — ED NOTES
Patient states \"the voices are telling me to kill myself\". Patient has sitter at bedside at this time and is calm and cooperative. MD aware.

## 2019-08-20 NOTE — PROGRESS NOTES
Pt accepted at [facility] by Dr Leela Chapman. Pt is going to Room 103 Bed 1. Call report to [phone]. ED MD, charge nurse and pt made aware.

## 2019-08-20 NOTE — ED NOTES
Report to [address] [place] [address] EMS, care transferred at this time. Life care given one bag patient belongings.

## 2019-08-20 NOTE — ED NOTES
Note:  Assuming care of patient at beginning of shift    6:25 AM  Patrice MCKEON MD, assumed care of patient at the beginning of my shift. 6:25 AM    Date: 8/19/2019  Patient Name: Thelma Bradford    History of Presenting Illness     Chief Complaint   Patient presents with    Suicidal       Nursing notes regarding the HPI and triage nursing notes were reviewed. Prior medical records were reviewed. Current Facility-Administered Medications   Medication Dose Route Frequency Provider Last Rate Last Dose    benztropine (COGENTIN) tablet 0.5 mg  0.5 mg Oral BID Reina Luna MD   0.5 mg at 08/19/19 2231    QUEtiapine (SEROquel) tablet 300 mg  300 mg Oral QHS Reina Luna MD         Current Outpatient Medications   Medication Sig Dispense Refill    QUEtiapine (SEROQUEL) 400 mg tablet Take 400 mg by mouth two (2) times a day.  lithium carbonate 300 mg tablet Take 300 mg by mouth three (3) times daily.  benztropine (COGENTIN) 0.5 mg tablet Take 1 mg by mouth two (2) times a day.  predniSONE (DELTASONE) 20 mg tablet Take 2 tabs in AM with food for 7 days then 1 tab in AM with food until gone 28 Tab 0    traZODone (DESYREL) 100 mg tablet Take 100 mg by mouth nightly.  naproxen (NAPROSYN) 500 mg tablet Take 1 Tab by mouth two (2) times daily (with meals). 60 Tab 0    risperiDONE (RISPERDAL) 1 mg tablet Take 1 Tab by mouth two (2) times a day.  Indications: mood stabilization 60 Tab 0       Past History     Past Medical History:  Past Medical History:   Diagnosis Date    Bipolar 1 disorder ([address]     Intermittent explosive disorder     Psychiatric disorder     schitzophrenic    Schizophrenia, schizo-affective ([address]        Past Surgical History:  Past Surgical History:   Procedure Laterality Date    HX TONSIL AND ADENOIDECTOMY         Family History:  Family History   Problem Relation Age of Onset    Diabetes Mother     Sleep Apnea Mother        Social History:  Social History     Tobacco Use    Smoking status: Current Every Day Smoker     Packs/day: 1.00     Years: 10.00     Pack years: 10.00    Smokeless tobacco: Never Used   Substance Use Topics    Alcohol use: No    Drug use: Yes     Types: Marijuana     Comment: history of abuse of crack       Allergies:  No Known Allergies    Patient's primary care provider (as noted in EPIC):  Reji Colvin MD    Abnormal lab results from this emergency department encounter:  Labs Reviewed   DRUG SCREEN, URINE - Abnormal; Notable for the following components:       Result Value    THC (TH-CANNABINOL) POSITIVE (*)     All other components within normal limits   CBC WITH AUTOMATED DIFF - Abnormal; Notable for the following components:    RBC 4.49 (*)     PLATELET 053 (*)     All other components within normal limits   LITHIUM - Abnormal; Notable for the following components:    Lithium level <0.20 (*)     All other components within normal limits   ETHYL ALCOHOL   METABOLIC PANEL, BASIC       Lab values for this patient within approximately the last 12 hours:  No results found for this or any previous visit (from the past 12 hour(s)). Radiologist and cardiologist interpretations if available at time of this note:  Radiology results:  No results found. Medication(s) ordered for patient during this emergency visit encounter:  Medications   benztropine (COGENTIN) tablet 0.5 mg (0.5 mg Oral Given 8/19/19 2231)   QUEtiapine (SEROquel) tablet 300 mg (has no administration in time range)       Pt care assumed from Dr. Maeve Nicole, ED provider. Pt complaint(s), current treatment plan, progression and available diagnostic results have been discussed thoroughly. Rounding occurred: no  Intended Disposition: Transfer. Pending diagnostic reports and/or labs (please list): Case management transfer of a suicidal ideation voluntary patient with hallucinations to an inpatient behavioral medicine facility.       Dictation disclaimer: Please note that this dictation was completed with Pusher, the computer voice recognition software. Quite often unanticipated grammatical, syntax, homophones, and other interpretive errors are inadvertently transcribed by the computer software. Please disregard these errors. Please excuse any errors that have escaped final proofreading. Coding Diagnoses     Clinical Impression:   1. Suicidal ideation    2. Schizoaffective disorder, unspecified type ([facility]        Disposition     Disposition:  Transfer. Usha Combs M.D.   MARIBEL Board Certified Emergency Physician

## 2019-08-20 NOTE — PROGRESS NOTES
Problem: Discharge Planning  Goal: *Discharge to safe environment  Outcome: Resolved/Met   Pt is accepted at [facility] and will be transferred today    Reason for Admission:   Suicidal ideation                   RRAT Score: 4                    Plan for utilizing home health:  no                        Current Advanced Directive/Advance Care Plan: none                         Transition of Care Plan:  Psych facility  Interviewed pt. Pt is homeless. His parents are  and live locally. On disability. Independent with ADLs. Demographics verified and updated. Care Management Interventions  PCP Verified by CM: Yes(seen PCP in Dec last year)  Palliative Care Criteria Met (RRAT>21 & CHF Dx)?: No  Mode of Transport at Discharge: [facility]  Transition of Care Consult (CM Consult): Discharge Planning  Physical Therapy Consult: No  Occupational Therapy Consult: No  Speech Therapy Consult: No  Current Support Network: Other(homeless)  Confirm Follow Up Transport: Other (see comment)(medicaid transport)  Plan discussed with Pt/Family/Caregiver: Yes  Discharge Location  Discharge Placement: Homeless      Patient has designated ____friend____ to participate in his/her discharge plan and to receive any needed information.      Name: Dorinda Dunaway  Address:  Phone [phone]
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
## 2019-08-20 NOTE — BH NOTES
Patient given Trazodone for insomnia, Vistaril for anxiety, and Haldol for psychosis will continue to monitor.

## 2019-08-21 PROCEDURE — 65220000003 HC RM SEMIPRIVATE PSYCH

## 2019-08-21 PROCEDURE — 74011250637 HC RX REV CODE- 250/637: Performed by: PSYCHIATRY & NEUROLOGY

## 2019-08-21 RX ORDER — OLANZAPINE 20 MG/1
20 TABLET, ORALLY DISINTEGRATING ORAL
Status: DISCONTINUED | OUTPATIENT
Start: 2019-08-21 | End: 2019-08-22

## 2019-08-21 RX ORDER — BENZTROPINE MESYLATE 1 MG/1
1 TABLET ORAL 2 TIMES DAILY
Status: DISCONTINUED | OUTPATIENT
Start: 2019-08-21 | End: 2019-08-26 | Stop reason: HOSPADM

## 2019-08-21 RX ORDER — FLUOXETINE HYDROCHLORIDE 20 MG/1
40 CAPSULE ORAL DAILY
Status: DISCONTINUED | OUTPATIENT
Start: 2019-08-21 | End: 2019-08-26 | Stop reason: HOSPADM

## 2019-08-21 RX ADMIN — HALOPERIDOL 5 MG: 5 TABLET ORAL at 16:32

## 2019-08-21 RX ADMIN — HYDROXYZINE PAMOATE 50 MG: 50 CAPSULE ORAL at 16:32

## 2019-08-21 RX ADMIN — OLANZAPINE 20 MG: 20 TABLET, ORALLY DISINTEGRATING ORAL at 20:07

## 2019-08-21 RX ADMIN — TRAZODONE HYDROCHLORIDE 50 MG: 50 TABLET ORAL at 20:07

## 2019-08-21 RX ADMIN — FLUOXETINE 40 MG: 20 CAPSULE ORAL at 12:34

## 2019-08-21 RX ADMIN — IBUPROFEN 400 MG: 400 TABLET ORAL at 16:32

## 2019-08-21 RX ADMIN — BENZTROPINE MESYLATE 1 MG: 1 TABLET ORAL at 20:07

## 2019-08-21 NOTE — BH NOTES
Treatment team met -     Medical Director:                                _x____present        Psychiatrist:                                        _x____present      Charge nurse:                                     x_____present           MSW:                                                _x____present      :                      _x____present      Nurse Manager:                                  _____present      Student RNs:                                      _____present      Medical Students:                               _x____present      Art Therapist:                                      _x____present   Clinical Coordinator:                           _x____present   Internal :                      _x____present        Plan of care discussed and updated as appropriate. New admission.

## 2019-08-21 NOTE — BSMART NOTE
OCCUPATIONAL THERAPY PROGRESS NOTE    Group Time:  0758  Attendance: The patient attended full group. .  Participation:  The patient participated fully in the activity. .  Attention:  The patient was able to focus on the activity. Interaction:  The patient frequently interacts with others. Mood bright in group. Answers appropriate and focused.

## 2019-08-21 NOTE — BSMART NOTE
Pt. Is a 24year old homeless male with history of Schizophrenia Disorder and Mild IDD. Marilyn Rodriguez Pt. Was admitted due to feeling depressed along with ideations to harm self with a plan . Pt. Also endorse hallucinations. Pt. s case was discussed in staffing this a.m. [facility]. Gela pt.'s PACT CM informed SW pt. Has been cooperative with taking the medications , they deliver to the pt. The staff reports , pt will not take medications from to the PACT team.  CM reports pt is homeless because he likes to spend his money they way he wants to. ANNA suggested a payee for the pt. The pt.'s CM will send over pt.'s med list.        ANNA Contact: ANNA met with pt to discuss this admission. Pt reports he has been stressed out. Pt reports to being homeless. Pt states he is trying to find a place for him and his friend to live. Pt. Made reference to an apartment. SW discussed various housing options that both pt and CM with [facility] can explore. Pt. Talked about want into return to [facility] services. ANNA explained to pt. [facility] services. SW encouraged medication compliance, safety plan and positive coping skills. Pt. Appears anxious, cooperative and lacks insight. SW will continue to assist he pt with dc planning.

## 2019-08-21 NOTE — BH NOTES
Patient admitted to the unit is a 24 yr old male transferred from [facility] escorted to the unit by security via transport. Patient is being admitted to the unit due to the patient have suicidal ideations, and auditory hallucinations. Patient is cooperative during nursing assessment. The patient stated that the last time he used Meth, and Crack was 3 months ago, and that he smoked marijuana a few days ago. The patient states that he hears voices telling him to harm himself. The patient states that he is currently living in a hotel that is paid for until the end of this month, then he has no where to stay. The patient stated that he doesn't get along with his mother due to his drug use. The patient stated that he gets along with his father, and step mother but they are located in [place], [place], and he would like to go live with them. The patient states he has been hearing voices since he was 9years old, but no one believed him. The patient currently receives a disability check every month. Patient denies thoughts of suicide, patient contracted for safety. Patient states he hears voices, will continue to monitor.

## 2019-08-21 NOTE — BSMART NOTE
ART THERAPY GROUP PROGRESS NOTE    PATIENT SCHEDULED FOR GROUP AT: 0645    ATTENDANCE: Full    PARTICIPATION LEVEL: Participates fully in the art process    ATTENTION LEVEL : Able to focus on task    FOCUS: Reality Orientation     SYMBOLIC & THEMATIC CONTENT AS NOTED IN IMAGERY: He was polite and timid upon arrival. He claimed that he was \"nervous\" when he first sat down, and was reassured that he need not be. He became calmer and more cheerful as he worked on the task at hand. His imagery and associations were child-like and cognitive limitations were evident. He stated after group: \"I was nervous because I am schizophrenic and bipolar and meeting new people sometimes is hard because I don't always understand, but I enjoyed myself. \"

## 2019-08-21 NOTE — H&P
[address] [facility] HISTORY AND PHYSICAL    Name:  Rayna Velasquez  MR#:   [id number]  :  1995  ACCOUNT #:  [de-identified]  ADMIT DATE:  2019    IDENTIFYING DATA:  The patient presents as a 59-year-old single white male, resident of [facility], who is currently homeless after staying in a hotel and using up all his money. He is covered by [facility] plan. BASIS FOR ADMISSION:  The patient is admitted in transfer to us from [facility] Emergency Department. He presented saying he had 2 weeks of suicidal thoughts. He said he was having auditory hallucinations telling him to kill himself and had stopped his medications. He apparently had run out and went to the [facility] on Friday, who proceeded to give him prescriptions, but he did not get them filled and was not taking medicines. His medicines listed there were quetiapine 400 mg b.i.d., lithium carbonate 300 mg t.i.d., benztropine 0.5 mg b.i.d., trazodone 100 mg at night, risperidone 1 mg b.i.d. He said he was not actually taking these medicines, but instead was seeing Dr. Shlomo Putnam at the [facility] who was giving him Zyprexa 15 mg in the morning and 20 mg at night, Prozac 20 mg twice a day, and benztropine 1 mg twice a day. He does have a past admission to this facility on 2018, describing schizoaffective disorder and intellectual disability. At that time, he was supposed to follow up with Dr. Yaneth Drake at [facility], and had stopped lithium and benztropine, and was taking risperidone 1 mg twice a day. On interview, the patient says that he has had at least 10 psychiatric admissions at [facility], 6 to [facility], and 1 to this facility.   He described his return of suicidal thoughts when he stopped his medications when he ran out  He said voices were telling him that they were going to hurt his good friend. They have also told him to hurt other people in the past, which he has actually done. He has scratched himself on the wrist in the past, and it has healed now and did not actually have any wounds. He described paranoid and persecutory ideas. MEDICAL HISTORY:  The patient denied significant medical complaints. ALLERGIES:  HE DENIED FOOD OR DRUG ALLERGIES. REVIEW OF SYSTEMS:  In the emergency room, the review of systems was negative for all except psychiatric symptoms of agitation, dysphoric mood, and hallucinations. PHYSICAL EXAMINATION:  Vital signs showed temperature 98 degrees, pulse 92, respirations 15, blood pressure 129/63, pO2 97%. Normal for constitution, HEENT, cardiovascular, pulmonary, musculoskeletal, neurological, and skin. He said he had a full range of affect, sad with depressed mood, suicidal ideas, and hearing command hallucinations. LABORATORY DATA:  Laboratory testing had included a normal CBC except a low platelet level of 318,182 per microliter; normal basic metabolic panel including normal eGFR; a lipid panel done on 04/03/2018, which was normal; lithium level 0; urine drug screen positive for cannabis; negative alcohol level. SUBSTANCE ABUSE HISTORY:  The patient endorsed cannabis use. He occasionally drinks 1 beer. He smokes 1 pack of cigarettes a day, having cut down from 3 packs per day in the past and does not now want nicotine patch or gum. SOCIAL AND FAMILY HISTORY:  Family history of psychiatric illness or drug or alcohol abuse was denied. He is a high school graduate and did not go to college. He says that he wants to study video game engineering. He does not have a girlfriend or children. He said that his mother lives in the [facility] area, but tells him to stay away and not talk to her.   He does not have any other family that is supportive, though he says he has this good friend, but does not wish to hurt him when he starts hearing voices. He is currently on disability. He gets a check, but when he stays in hotels, he uses up all of his check within the first 2 weeks of the month and then does not have any money anywhere to stay. MENTAL STATUS EXAMINATION:  Reveals him to be an alert, oriented, very odd-appearing white male. Psychomotor activity was mildly slowed. Mood was unhappy with a blunted and odd affect. Thought processing was simplistic, but logical.  He endorsed paranoid and persecutory ideas as well as auditory hallucinations and referential ideas. He endorsed voices that would tell him to hurt other people and voices that would tell him to harm himself, but he did not really want to do either of those things. He actually has acted on these things in the past.  Insight and judgment are influenced by his psychosis. IQ was estimated in the borderline intellectual function range. ASSESSMENT:  AXIS I:  Schizophrenia. AXIS II:  None. AXIS III:  None. ASSESSMENT AND TREATMENT PLAN:  This patient is admitted voluntarily after self-presentation to emergency room. We will resume him back on the medicines that we believe he was taking. He says he was taking Prozac 20 mg b.i.d. We will actually write for this for 40 mg daily. He says that he was taking Zyprexa 15 mg in the morning and 20 mg at night, which is relatively high dose. So, we will hold off and just give him only the 20 mg at night while we will try to get information from the [employer]. We will continue benztropine 1 mg twice a day. We will continue with individual, group, and milieu therapies, art and recreation therapy, case management services, and social work services. ESTIMATED LENGTH OF STAY:  7 days. ANTICIPATED DISPOSITION:  Follow up [employer]. PROGNOSIS:  Fair.       Michelle Carmona MD      [id number]  D:  08/21/2019 12:47  T:  08/21/2019 14:35  JOB #:  [id number]

## 2019-08-21 NOTE — BSMART NOTE
SOCIAL WORK GROUP THERAPY PROGRESS NOTE      Time: 3:30     Group Topic:  POSITIVE GOAL SETTING FALLING FORWARD IN TREATMENT    Group Participation: Pt. participated in todays group. Pt. Reflected on treatment goals. Pt was provided positive feedback for his participation.

## 2019-08-21 NOTE — BH NOTES
Patient c/o headache 10/10 and \"hearing voices really bad\". Medicated with motrin, vistaril, and haldol po.

## 2019-08-21 NOTE — PROGRESS NOTES
Problem: Suicide  Goal: *STG: Remains safe in hospital  Description  Patient to remain safe every day while hospitalized. Outcome: Progressing Towards Goal  Goal: *STG: Attends activities and groups  Description  Patient to attend 2-3 group therapy every day while hospitalized. Outcome: Progressing Towards Goal     Problem: Altered Thought Process (Adult/Pediatric)  Goal: *STG: Decreased hallucinations  Description  Patient to have decreased hallucinations every day while hospitalized. Outcome: Not Progressing Towards Goal  Patient observed pacing in the unit. States he was still hearing voices. Denies suicidal ideations at this time.

## 2019-08-22 PROCEDURE — 74011250637 HC RX REV CODE- 250/637: Performed by: PSYCHIATRY & NEUROLOGY

## 2019-08-22 PROCEDURE — 65220000003 HC RM SEMIPRIVATE PSYCH

## 2019-08-22 RX ORDER — OLANZAPINE 5 MG/1
15 TABLET, ORALLY DISINTEGRATING ORAL
Status: DISCONTINUED | OUTPATIENT
Start: 2019-08-22 | End: 2019-08-26 | Stop reason: HOSPADM

## 2019-08-22 RX ORDER — OLANZAPINE 5 MG/1
5 TABLET, ORALLY DISINTEGRATING ORAL DAILY
Status: DISCONTINUED | OUTPATIENT
Start: 2019-08-23 | End: 2019-08-26 | Stop reason: HOSPADM

## 2019-08-22 RX ADMIN — BENZTROPINE MESYLATE 1 MG: 1 TABLET ORAL at 08:22

## 2019-08-22 RX ADMIN — BENZTROPINE MESYLATE 1 MG: 1 TABLET ORAL at 20:05

## 2019-08-22 RX ADMIN — IBUPROFEN 400 MG: 400 TABLET ORAL at 11:44

## 2019-08-22 RX ADMIN — FLUOXETINE 40 MG: 20 CAPSULE ORAL at 08:22

## 2019-08-22 RX ADMIN — IBUPROFEN 400 MG: 400 TABLET ORAL at 17:20

## 2019-08-22 NOTE — BSMART NOTE
OCCUPATIONAL THERAPY PROGRESS NOTE    Group Time:  9022  Attendance: The patient attended full group. .. Participation:  The patient participated fully in the activity. Attention:  The patient was able to focus on the activity. Interaction:  The patient occasionally  interacts with others. [place], appropriate in responses. Limited insight, trouble actually identifying how much income he has and how much he paid for hotel room, does say it was split with another person.

## 2019-08-22 NOTE — BH NOTES
Patient has been visible in dayroom most of this shift. Pleasant on approach. C/O feeling anxious several times. He is unable to pinpoint what is happening to cause the anxiety. Reports +AH that are \"faint\". Denies +SI/VH. Compliant with medication regimen. No behavior issues noted this period.

## 2019-08-22 NOTE — PROGRESS NOTES
Behavioral Health Progress Note    Admit Date: 8/20/2019  Hospital day 1    Vitals : No data found. Labs:  No results found for this or any previous visit (from the past 24 hour(s)). Meds:   Current Facility-Administered Medications   Medication Dose Route Frequency    OLANZapine (ZyPREXA zydis) disintegrating tablet 15 mg  15 mg Oral QHS    [START ON 8/23/2019] OLANZapine (ZyPREXA zydis) disintegrating tablet 5 mg  5 mg Oral DAILY    FLUoxetine (PROzac) capsule 40 mg  40 mg Oral DAILY    benztropine (COGENTIN) tablet 1 mg  1 mg Oral BID    hydrOXYzine pamoate (VISTARIL) capsule 50 mg  50 mg Oral Q4H PRN    haloperidol (HALDOL) tablet 5 mg  5 mg Oral Q4H PRN    haloperidol lactate (HALDOL) injection 5 mg  5 mg IntraMUSCular Q4H PRN    LORazepam (ATIVAN) injection 1-2 mg  1-2 mg IntraMUSCular Q4H PRN    traZODone (DESYREL) tablet 50 mg  50 mg Oral QHS PRN    ibuprofen (MOTRIN) tablet 400 mg  400 mg Oral Q4H PRN      Hospital Problems: Principal Problem:    Schizophrenia ([facility] [address] (8/20/2019)        Subjective:   Medication side effects: none  dry mouth    Mental Status Exam  Sensorium: alert  Orientation: only aware of  time, place and person  Relations: passive  Eye Contact: appropriate  Appearance: is unkempt  Thought Process: slow rate of thoughts and poor abstract reasoning/computation   Thought Content: auditory halluc   Suicidal: denies   Homicidal: none   Mood: is anxious   Affect: odd, constricted  Memory: shows no evidence of impairment     Concentration: fair  Abstraction: concrete  Insight: The patient shows little insight    OR Fair  Judgement: is psychologically impaired OR  Fair    Assessment/Plan:   not changed  Appetite OK. Sleeps 4-5 hours, anxious in PM. Feels meds wear off then. Hears voices still, not as loud at times. Denies paranoia. Continue close observation,  Split Zyprexa dosing to 5 mg in AM and 10 mg QHS to see if AM dosing can help cut the daytime anxiety. . Supportive care. Patient Information     Patient Name MRN Sex Rayshawn Rosas [id number] Male 1942      Telephone Encounter by Maite Busby RN at 3/16/2017  3:34 PM     Author:  Maite Busby RN Service:  (none) Author Type:  NURS- Registered Nurse     Filed:  3/16/2017  3:37 PM Encounter Date:  3/16/2017 Status:  Signed     :  Maite Busby RN (NURS- Registered Nurse)            Diabetic Supplies    Office visit in the past 12 months.    Last visit with MAIK ANAYA was on: 2017 in [facility]  Next visit with MAIK ANAYA is on: No future appointment listed with this provider  Next visit with Internal Medicine is on: No future appointment listed in this department    Max refill for 12 months from last office visit.  Always add ICD-9 code.    Needs not be listed on Med List to fill.  Need new RX every 12 months or if exceeds the limitations set by Medicare, then every 6 months.  Also when testing frequency is changed is there a need to obtain a new order.  A refill request does not need to be approved by the ordering physician-a beneficiary or their caregiver may request refills.  Physicians are not required to fill out additional forms such as home testing results for suppliers or provide additional documentation unless the supplier is audited and the  is requesting such documentation.    Prescription refilled per RN Medication Refill Policy.................... MAITE BUSBY RN ....................  3/16/2017   3:34 PM

## 2019-08-22 NOTE — BSMART NOTE
ART THERAPY GROUP PROGRESS NOTE    PATIENT SCHEDULED FOR GROUP AT: 1159    ATTENDANCE: Full    PARTICIPATION LEVEL: Participates fully in the art process    ATTENTION LEVEL: Able to focus on task    FOCUS: Control    SYMBOLIC & THEMATIC CONTENT AS NOTED IN IMAGERY: He was pleasant and compliant. He participated in group discussion and displays a concrete and simplistic thought process. He claimed near the end of group that he felt \"anxious,\" then claimed, \"well, maybe I might just be hungry. \"

## 2019-08-22 NOTE — BSMART NOTE
Pt. Is a 24year old homeless male with history of Schizophrenia Disorder and Mild IDD. .  Pt. Was admitted due to feeling depressed along with ideations to harm self with a plan . Pt. Also endorse hallucinations. Pt. Is a [facility] PACT client. SW Contact:  SW met with pt to discuss dc planning. Pt reports he has continues anxiety and endorses hearing voices. SW suggested   SW discussed positive coping skills, positive  self talk and safety plan. Pt denies  hallucinations. SW reviewed safety plan. Pt. cooperative and lacks insight.   SW will continue to assist he pt with dc planning

## 2019-08-22 NOTE — PROGRESS NOTES
Problem: Suicide  Goal: *STG: Attends activities and groups  Description  Patient to attend 2-3 group therapy every day while hospitalized. Outcome: Progressing Towards Goal     Problem: Altered Thought Process (Adult/Pediatric)  Goal: *STG: Decreased hallucinations  Description  Patient to have decreased hallucinations every day while hospitalized. Outcome: Progressing Towards Goal   Patient states  he is doing better, auditory  hallucinations, states he always hear voices but they are softer. Denies suicidal thoughts.

## 2019-08-23 PROCEDURE — 65220000003 HC RM SEMIPRIVATE PSYCH

## 2019-08-23 PROCEDURE — 74011250637 HC RX REV CODE- 250/637: Performed by: PSYCHIATRY & NEUROLOGY

## 2019-08-23 RX ADMIN — BENZTROPINE MESYLATE 1 MG: 1 TABLET ORAL at 08:13

## 2019-08-23 RX ADMIN — IBUPROFEN 400 MG: 400 TABLET ORAL at 11:42

## 2019-08-23 RX ADMIN — IBUPROFEN 400 MG: 400 TABLET ORAL at 19:02

## 2019-08-23 RX ADMIN — OLANZAPINE 5 MG: 5 TABLET, ORALLY DISINTEGRATING ORAL at 08:14

## 2019-08-23 RX ADMIN — OLANZAPINE 15 MG: 5 TABLET, ORALLY DISINTEGRATING ORAL at 20:19

## 2019-08-23 RX ADMIN — FLUOXETINE 40 MG: 20 CAPSULE ORAL at 08:13

## 2019-08-23 RX ADMIN — BENZTROPINE MESYLATE 1 MG: 1 TABLET ORAL at 20:20

## 2019-08-23 NOTE — PROGRESS NOTES
Behavioral Health Progress Note    Admit Date: 8/20/2019  Hospital day 2    Vitals :   Patient Vitals for the past 8 hrs:   BP Temp Pulse Resp   08/23/19 0807 106/71 97.1 °F (36.2 °C) 81 18     Labs:  No results found for this or any previous visit (from the past 24 hour(s)). Meds:   Current Facility-Administered Medications   Medication Dose Route Frequency    OLANZapine (ZyPREXA zydis) disintegrating tablet 15 mg  15 mg Oral QHS    OLANZapine (ZyPREXA zydis) disintegrating tablet 5 mg  5 mg Oral DAILY    FLUoxetine (PROzac) capsule 40 mg  40 mg Oral DAILY    benztropine (COGENTIN) tablet 1 mg  1 mg Oral BID    hydrOXYzine pamoate (VISTARIL) capsule 50 mg  50 mg Oral Q4H PRN    haloperidol (HALDOL) tablet 5 mg  5 mg Oral Q4H PRN    haloperidol lactate (HALDOL) injection 5 mg  5 mg IntraMUSCular Q4H PRN    LORazepam (ATIVAN) injection 1-2 mg  1-2 mg IntraMUSCular Q4H PRN    traZODone (DESYREL) tablet 50 mg  50 mg Oral QHS PRN    ibuprofen (MOTRIN) tablet 400 mg  400 mg Oral Q4H PRN      Hospital Problems: Principal Problem:    Schizophrenia ([address] (8/20/2019)        Subjective:   Medication side effects: none  none    Mental Status Exam  Sensorium: alert  Orientation: only aware of  time, place and person  Relations: passive  Eye Contact: poor  Appearance: shows no evidence of impairment  Thought Process: slow rate of thoughts and poor abstract reasoning/computation   Thought Content: no evidence of impairment   Suicidal: denies   Homicidal: none   Mood: is euthymic   Affect: odd, constricted  Memory: is impaired and is remote     Concentration: fair  Abstraction: concrete  Insight: The patient shows little insight    OR Fair  Judgement: is psychologically impaired OR  Fair    Assessment/Plan:   improved  Seen today by PACT team, they say he has a place to stay but it may not happen. He has a friend that he runs w/ on the street and they live on the streets. Friend works as a .  Pt say he would prefer to live on the street where no one controls him. Would stay in [address] program when cold. Takes showers and food at [employer]. Does not wish to go to [employer]. Will continue on meds w/ [facility].  Denies SI and HI, denies halluc  Continue close observation, tentative DC on Monday

## 2019-08-23 NOTE — PROGRESS NOTES
Problem: Suicide  Goal: *STG: Attends activities and groups  Description  Patient to attend 2-3 group therapy every day while hospitalized. Outcome: Progressing Towards Goal     Problem: Altered Thought Process (Adult/Pediatric)  Goal: *STG: Participates in treatment plan  Description  Patient to participate in treatment plan every day while hospitalized. Outcome: Progressing Towards Goal  Goal: *STG: Decreased hallucinations  Description  Patient to have decreased hallucinations every day while hospitalized. Outcome: Progressing Towards Goal   Patient doing well, he states the voices have improved, states \" Not as loud\". Patient states his anxiety has decrease as well.

## 2019-08-23 NOTE — BH NOTES
The writer has observed the patient's behavior throughout this shift ([id number]). During this shift the patient has been observing participating in group sessions, being active with peers and staff. Patient has been calm pleasant and cooperative with staff. In addition, the patient has also been observed creating bizarre noises along with another patient, but nothing disruptive. Staff will continue to monitor patient's safety and location.

## 2019-08-23 NOTE — BSMART NOTE
OCCUPATIONAL THERAPY PROGRESS NOTE    Group Time:  0266  Attendance: The patient attended full group. .  Participation:  The patient participated fully in the activity. Attention:  The patient was able to focus on the activity. Interaction:  The patient frequently interacts with others. [place], appropriate in interactions. Occasional embellishment suspected.

## 2019-08-23 NOTE — BSMART NOTE
Pt. Is a 24year old homeless male with history of Schizophrenia Disorder and Mild IDD. .  Pt. Was admitted due to feeling depressed along with ideations to harm self with a plan . Pt. Also endorse hallucinations. Pt. Is a [employer] client. [facility]d.'[facility]. The CM with provided an update on the pt. CM informed the staff on the unit pt. Does have a place to live upon dc.      Pt.'s case was discussed with the nurse and treating psychiatrist       SW Contact:  SW met with pt to discuss dc planning. Pt. Informed SW he has good news. Pt reports to having a place to live. Pt. States he is going to stay temporarily;y in a hotel until he moves in his new apartment on September 1, 2019. Pt. States he is no longer hearing voices. Pt. Denies ideations and hallucinations. SW had pt to reflect on treatment goals. Pt. States he hears voices less when he is preoccupied with activities. .  Pt. Reflected on positive activities he enjoys participating in. SW discussed positive coping skills and safety plan. Pt.'s mood is improving  cooperative and lacks insight.  SW will continue to assist he pt with dc planning.

## 2019-08-23 NOTE — BSMART NOTE
ART THERAPY GROUP PROGRESS NOTE    PATIENT SCHEDULED FOR GROUP AT: 10:00    ATTENDANCE: 3/4    PARTICIPATION LEVEL: Participates fully in the art process. ATTENTION LEVEL: Able to focus on task. FOCUS: Grounding    SYMBOLIC & THEMATIC CONTENT AS NOTED IN IMAGERY: He was actively engaged in the art process, he left ten minutes early to use the restroom. He presented with a calm mood, pleasant affect, and his thought processes were predominantly logical. He displayed effective interpersonal skills with this therapist and other members of the group. His approach to task was purposeful and his color choice appeared to be spontaneous and disorganized. He reported feeling calm during the group.

## 2019-08-24 PROCEDURE — 65220000003 HC RM SEMIPRIVATE PSYCH

## 2019-08-24 PROCEDURE — 74011250637 HC RX REV CODE- 250/637: Performed by: PSYCHIATRY & NEUROLOGY

## 2019-08-24 RX ADMIN — FLUOXETINE 40 MG: 20 CAPSULE ORAL at 08:21

## 2019-08-24 RX ADMIN — HYDROXYZINE PAMOATE 50 MG: 50 CAPSULE ORAL at 15:48

## 2019-08-24 RX ADMIN — BENZTROPINE MESYLATE 1 MG: 1 TABLET ORAL at 20:28

## 2019-08-24 RX ADMIN — TRAZODONE HYDROCHLORIDE 50 MG: 50 TABLET ORAL at 20:33

## 2019-08-24 RX ADMIN — BENZTROPINE MESYLATE 1 MG: 1 TABLET ORAL at 08:21

## 2019-08-24 RX ADMIN — OLANZAPINE 15 MG: 5 TABLET, ORALLY DISINTEGRATING ORAL at 20:29

## 2019-08-24 RX ADMIN — IBUPROFEN 400 MG: 400 TABLET ORAL at 15:48

## 2019-08-24 RX ADMIN — OLANZAPINE 5 MG: 5 TABLET, ORALLY DISINTEGRATING ORAL at 08:21

## 2019-08-24 RX ADMIN — IBUPROFEN 400 MG: 400 TABLET ORAL at 10:44

## 2019-08-24 NOTE — PROGRESS NOTES
Behavioral Health Progress Note    Admit Date: 8/20/2019  Hospital day 3    Vitals :   Patient Vitals for the past 8 hrs:   BP Temp Pulse Resp   08/24/19 0847 101/63 97.4 °F (36.3 °C) 75 18     Labs:  No results found for this or any previous visit (from the past 24 hour(s)). Meds:   Current Facility-Administered Medications   Medication Dose Route Frequency    OLANZapine (ZyPREXA zydis) disintegrating tablet 15 mg  15 mg Oral QHS    OLANZapine (ZyPREXA zydis) disintegrating tablet 5 mg  5 mg Oral DAILY    FLUoxetine (PROzac) capsule 40 mg  40 mg Oral DAILY    benztropine (COGENTIN) tablet 1 mg  1 mg Oral BID    hydrOXYzine pamoate (VISTARIL) capsule 50 mg  50 mg Oral Q4H PRN    haloperidol (HALDOL) tablet 5 mg  5 mg Oral Q4H PRN    haloperidol lactate (HALDOL) injection 5 mg  5 mg IntraMUSCular Q4H PRN    LORazepam (ATIVAN) injection 1-2 mg  1-2 mg IntraMUSCular Q4H PRN    traZODone (DESYREL) tablet 50 mg  50 mg Oral QHS PRN    ibuprofen (MOTRIN) tablet 400 mg  400 mg Oral Q4H PRN      Hospital Problems: Principal Problem:    Schizophrenia ([address] (8/20/2019)        Subjective:   Medication side effects: none  none    Mental Status Exam  Sensorium: alert  Orientation: only aware of  time, place and person  Relations: cooperative  Eye Contact: poor  Appearance: is bizarre  Thought Process: slow rate of thoughts and poor abstract reasoning/computation   Thought Content: no evidence of impairment   Suicidal: denies   Homicidal: none   Mood: is euthymic   Affect: stable  Memory: shows no evidence of impairment     Concentration: distractable  Abstraction: concrete  Insight: The patient shows little insight    OR Fair  Judgement: is psychologically impaired OR  Fair    Assessment/Plan:   improved  Says that he always hears voices in his head and taking meds helps him to ignore them. No med complaints. Plans on DC to the streets.    Continue close observation, supportive care

## 2019-08-24 NOTE — PROGRESS NOTES
Problem: Falls - Risk of  Goal: *Absence of Falls  Description  Patient to be absence of falls every day while hospitalized. Outcome: Progressing Towards Goal  Note:   Fall Risk Interventions:            Medication Interventions: Teach patient to arise slowly                   Problem: Suicide  Goal: *STG: Remains safe in hospital  Description  Patient to remain safe every day while hospitalized. Outcome: Progressing Towards Goal  Goal: *STG: Attends activities and groups  Description  Patient to attend 2-3 group therapy every day while hospitalized. Outcome: Progressing Towards Goal     Problem: Cannabis Withdrawal  Goal: *STG: Participates in treatment plan  Description  Patient to participate in treatment plan every day while hospitalized. Outcome: Progressing Towards Goal  Goal: *STG: Complies with medication therapy  Description  Patient to take medication therapy every day while hospitalized. Outcome: Progressing Towards Goal     Problem: Altered Thought Process (Adult/Pediatric)  Goal: *STG: Participates in treatment plan  Description  Patient to participate in treatment plan every day while hospitalized. Outcome: Progressing Towards Goal  Goal: *STG: Decreased hallucinations  Description  Patient to have decreased hallucinations every day while hospitalized. Outcome: Progressing Towards Goal   Pt is pleasant and cooperative. He is compliant with medication and participating in groups. Pt states he still hears voices but they are better. Pt states the voices never go away they just get quiet with medication. He denies any thoughts of harming self or others.

## 2019-08-24 NOTE — BH NOTES
Patient cooperative, calm and pleasant. He does not like noisy environment, he appeared to be very anxious when noisy then run to his room. Patient stated that his day has been good and very interesting, his goal for today is to participate in the groups and he was able to do so. Patient stated that he might be discharged on Monday and he is looking forward to that because he might be starting a part time job once he is discharged. Patient ate 100% dinner, remained in the day room for most part of the shift, interacted with peers and staff. Patient received his medications as scheduled and he remained free of falls. Will continue to monitor for safety.

## 2019-08-25 PROCEDURE — 74011250637 HC RX REV CODE- 250/637: Performed by: PSYCHIATRY & NEUROLOGY

## 2019-08-25 PROCEDURE — 65220000003 HC RM SEMIPRIVATE PSYCH

## 2019-08-25 RX ORDER — GUAIFENESIN 600 MG/1
600 TABLET, EXTENDED RELEASE ORAL 2 TIMES DAILY
Status: DISCONTINUED | OUTPATIENT
Start: 2019-08-25 | End: 2019-08-26 | Stop reason: HOSPADM

## 2019-08-25 RX ADMIN — TRAZODONE HYDROCHLORIDE 50 MG: 50 TABLET ORAL at 20:09

## 2019-08-25 RX ADMIN — BENZTROPINE MESYLATE 1 MG: 1 TABLET ORAL at 08:23

## 2019-08-25 RX ADMIN — GUAIFENESIN 600 MG: 600 TABLET, EXTENDED RELEASE ORAL at 20:12

## 2019-08-25 RX ADMIN — FLUOXETINE 40 MG: 20 CAPSULE ORAL at 08:23

## 2019-08-25 RX ADMIN — BENZTROPINE MESYLATE 1 MG: 1 TABLET ORAL at 20:09

## 2019-08-25 RX ADMIN — OLANZAPINE 5 MG: 5 TABLET, ORALLY DISINTEGRATING ORAL at 08:23

## 2019-08-25 RX ADMIN — IBUPROFEN 400 MG: 400 TABLET ORAL at 08:23

## 2019-08-25 RX ADMIN — IBUPROFEN 400 MG: 400 TABLET ORAL at 15:59

## 2019-08-25 RX ADMIN — OLANZAPINE 15 MG: 5 TABLET, ORALLY DISINTEGRATING ORAL at 20:12

## 2019-08-25 NOTE — BH NOTES
Patient visible on unit. States he is having a \"good\" day. Smiles appropriately in conversation. Compliant with medications; motrin helpful for headache. Denies +AH/SI. Interacting with peers. Attended group activities with good participation. Contracts for safety on unit.

## 2019-08-25 NOTE — PROGRESS NOTES
Problem: Falls - Risk of  Goal: *Absence of Falls  Description  Patient to be absence of falls every day while hospitalized. Outcome: Progressing Towards Goal  Note:   Fall Risk Interventions:            Medication Interventions: Teach patient to arise slowly                   Problem: Suicide  Goal: *STG: Remains safe in hospital  Description  Patient to remain safe every day while hospitalized. Outcome: Progressing Towards Goal  Goal: *STG: Attends activities and groups  Description  Patient to attend 2-3 group therapy every day while hospitalized. Outcome: Progressing Towards Goal  Goal: Interventions  Outcome: Progressing Towards Goal     Problem: Cannabis Withdrawal  Goal: *STG: Participates in treatment plan  Description  Patient to participate in treatment plan every day while hospitalized. Outcome: Progressing Towards Goal  Goal: *STG: Complies with medication therapy  Description  Patient to take medication therapy every day while hospitalized. Outcome: Progressing Towards Goal  Goal: Interventions  Outcome: Progressing Towards Goal     Problem: Altered Thought Process (Adult/Pediatric)  Goal: *STG: Participates in treatment plan  Description  Patient to participate in treatment plan every day while hospitalized. Outcome: Progressing Towards Goal  Goal: *STG: Decreased hallucinations  Description  Patient to have decreased hallucinations every day while hospitalized. Outcome: Progressing Towards Goal  Goal: Interventions  Outcome: Progressing Towards Goal   Pt is pleasant and cooperative. He states he feels ready to go home tomorrow. Pt denies hearing any voices today. He is compliant with medication and is participating in groups. Pt is also interacting with peers and staff.

## 2019-08-25 NOTE — PROGRESS NOTES
Behavioral Health Progress Note    Admit Date: 8/20/2019  Hospital day 4    Vitals :   Patient Vitals for the past 8 hrs:   BP Temp Pulse Resp   08/25/19 0752 105/77 96.8 °F (36 °C) 72 14     Labs:  No results found for this or any previous visit (from the past 24 hour(s)). Meds:   Current Facility-Administered Medications   Medication Dose Route Frequency    guaiFENesin ER (MUCINEX) tablet 600 mg  600 mg Oral BID    OLANZapine (ZyPREXA zydis) disintegrating tablet 15 mg  15 mg Oral QHS    OLANZapine (ZyPREXA zydis) disintegrating tablet 5 mg  5 mg Oral DAILY    FLUoxetine (PROzac) capsule 40 mg  40 mg Oral DAILY    benztropine (COGENTIN) tablet 1 mg  1 mg Oral BID    hydrOXYzine pamoate (VISTARIL) capsule 50 mg  50 mg Oral Q4H PRN    haloperidol (HALDOL) tablet 5 mg  5 mg Oral Q4H PRN    haloperidol lactate (HALDOL) injection 5 mg  5 mg IntraMUSCular Q4H PRN    LORazepam (ATIVAN) injection 1-2 mg  1-2 mg IntraMUSCular Q4H PRN    traZODone (DESYREL) tablet 50 mg  50 mg Oral QHS PRN    ibuprofen (MOTRIN) tablet 400 mg  400 mg Oral Q4H PRN      Hospital Problems: Principal Problem:    Schizophrenia ([address] (8/20/2019)        Subjective:   Medication side effects: none  congestion    Mental Status Exam  Sensorium: alert  Orientation: only aware of  time, place and person  Relations: cooperative  Eye Contact: poor  Appearance: shows no evidence of impairment  Thought Process: normal rate of thoughts and poor abstract reasoning/computation   Thought Content: aud halluc   Suicidal: denies   Homicidal: none   Mood: is euthymic   Affect: odd,blunted  Memory: is impaired, is recent and is remote     Concentration: fair  Abstraction: concrete  Insight: The patient shows no insight    OR Poor  Judgement: is psychologically impaired OR  Poor    Assessment/Plan:   not changed  Has felt congested, says he takes Mucinex bid. Does get some HA from his nasal congestion. No SI, HI or severe side effects.  Has auditory halluc that he can ignore  Continue close observation, tentative DC tomorrow am, reality orient, supportive care

## 2019-08-25 NOTE — BH NOTES
Patient out of room in day area most of shift. States he talked to his mother and feels she \"doesn't care about me because she drives and haven't been up here to see me. \" Participated in group activities and sociable with peers. No further c/o pain or discomfort. Denies +SI/VH. States \"the voices are always there but faint right now. \" Compliant with medications. Contracts for safety.

## 2019-08-26 VITALS
DIASTOLIC BLOOD PRESSURE: 73 MMHG | RESPIRATION RATE: 16 BRPM | OXYGEN SATURATION: 97 % | HEART RATE: 79 BPM | TEMPERATURE: 98.6 F | SYSTOLIC BLOOD PRESSURE: 112 MMHG

## 2019-08-26 PROCEDURE — 74011250637 HC RX REV CODE- 250/637: Performed by: PSYCHIATRY & NEUROLOGY

## 2019-08-26 RX ORDER — FLUOXETINE HYDROCHLORIDE 40 MG/1
40 CAPSULE ORAL DAILY
Qty: 15 CAP | Refills: 1 | Status: SHIPPED | OUTPATIENT
Start: 2019-08-27 | End: 2019-08-26

## 2019-08-26 RX ORDER — BENZTROPINE MESYLATE 0.5 MG/1
1 TABLET ORAL 2 TIMES DAILY
Qty: 30 TAB | Refills: 1 | Status: SHIPPED | OUTPATIENT
Start: 2019-08-26 | End: 2021-03-01

## 2019-08-26 RX ORDER — TRAZODONE HYDROCHLORIDE 50 MG/1
50 TABLET ORAL
Qty: 15 TAB | Refills: 1 | Status: SHIPPED | OUTPATIENT
Start: 2019-08-26 | End: 2021-03-01

## 2019-08-26 RX ORDER — FLUOXETINE HYDROCHLORIDE 40 MG/1
40 CAPSULE ORAL DAILY
Qty: 15 CAP | Refills: 1 | Status: SHIPPED | OUTPATIENT
Start: 2019-08-27 | End: 2021-03-01

## 2019-08-26 RX ORDER — OLANZAPINE 5 MG/1
5 TABLET, ORALLY DISINTEGRATING ORAL DAILY
Qty: 15 TAB | Refills: 1 | Status: SHIPPED | OUTPATIENT
Start: 2019-08-27 | End: 2021-03-01

## 2019-08-26 RX ORDER — OLANZAPINE 15 MG/1
15 TABLET, ORALLY DISINTEGRATING ORAL
Qty: 15 TAB | Refills: 1 | Status: SHIPPED | OUTPATIENT
Start: 2019-08-26 | End: 2021-03-01

## 2019-08-26 RX ORDER — GUAIFENESIN 600 MG/1
600 TABLET, EXTENDED RELEASE ORAL 2 TIMES DAILY
Qty: 30 TAB | Refills: 1 | Status: SHIPPED | OUTPATIENT
Start: 2019-08-26 | End: 2021-03-01

## 2019-08-26 RX ADMIN — FLUOXETINE 40 MG: 20 CAPSULE ORAL at 08:23

## 2019-08-26 RX ADMIN — GUAIFENESIN 600 MG: 600 TABLET, EXTENDED RELEASE ORAL at 08:23

## 2019-08-26 RX ADMIN — BENZTROPINE MESYLATE 1 MG: 1 TABLET ORAL at 08:22

## 2019-08-26 RX ADMIN — OLANZAPINE 5 MG: 5 TABLET, ORALLY DISINTEGRATING ORAL at 08:22

## 2019-08-26 NOTE — PROGRESS NOTES
Reviewed discharge instructions with patients . Verbalized understanding. Patient wating for CW to .

## 2019-08-26 NOTE — BSMART NOTE
Pt. Is a 24year old homeless male with history of Schizophrenia Disorder and Mild IDD. .  Pt. Was admitted due to feeling depressed along with ideations to harm self with a plan . Pt. Also endorse hallucinations. Pt. Is a [facility] PACT client.      DC Plan:  SW met with pt to discuss dc plan. Pt. Expressed I am great. I get to go home today. SW discussed the  importance of medication management and safety plan. Pt states he plans to continue to follow-up with outpt services with [address] [place] [address] PACT team.  Pt. Denies ideations and hallucinations. Pt. Will follow-up with Dr. Radha Thomas 8/26/19 @  2:00 @ [facility] [facility] [address] [place] [address]. Brandie [address]      [phone]. ANNA contacted Sheila Valdez pt.'s CM with  PACT to discuss dc. CM was not available. Sw left a message.

## 2019-08-26 NOTE — DISCHARGE SUMMARY
[facility] DISCHARGE    Name:  Dwayne Mcguire  MR#:   [id number]  :  1995  ACCOUNT #:  [de-identified]  ADMIT DATE:  2019  DISCHARGE DATE:  2019    IDENTIFYING DATA:  The patient presented as a 50-year-old, single, white male, resident of [place], [place], who is homeless after staying at a hotel and using up all of his money. He is covered by [facility] plan. He presented to [facility] saying he had two weeks of suicidal thoughts. He was having auditory hallucinations telling him to kill himself and had stopped his medications. He said he had gone to the [facility] after running out of medicines, and they gave him medicine prescriptions, but he did not get them filled. Medicines were described to be prescribed by Dr. Rodrigo Henderson who had him on Zyprexa 15 mg in the morning and 20 mg at night, Prozac 20 mg twice a day and benztropine 1 mg twice a day. He had a past admission to this facility on 2018 for schizoaffective disorder and intellectual disability. He was supposed to follow up with Dr. Emily Bustos at [facility]. He apparently went there only briefly. He said he has at least ten psychiatric admissions to [facility], six to [facility], and one to this facility. Vital signs in the emergency room showed temperature 98 degrees, pulse 92, respirations 15, blood pressure 129/63, pO2 of 97%. Physical examination was normal.    Laboratory testing included a normal CBC except low platelet level at 517,718, normal basic metabolic panel including normal eGFR, lipid panel done on 2018 which was normal.  Lithium level 0. Urine drug screen positive for cannabis. Negative alcohol level. HOSPITAL COURSE:  The patient was admitted to the locked special treatment unit where he was afforded individual, group and milieu therapies.   Vital signs remained normal.  He was treated in the hospital with benztropine 1 mg b.i.d. for extrapyramidal symptoms, fluoxetine 40 mg daily, guaifenesin  mg b.i.d. for congestion, several doses of Haldol 5 mg for agitation when first arrived, hydroxyzine 50 mg as needed for anxiety with the last dosing on 08/24/2019, ibuprofen 400 mg as needed for pain, olanzapine 5 mg in the morning and 15 mg at bedtime, trazodone 50 mg at bedtime for sleep receiving a total of four doses. His mood improved in the structure of the hospital.  He said that his hallucinations never go away no matter how much medicine he takes. He says when he takes the medicine, it helps him to calm down and he can ignore them and not act on them. He said he had no intentions whatsoever of living in any type of housing that the [employer] would set up for him. He said that he preferred to stay in [address] [place] [address] for several weeks of the month until his money ran out and then he and a friend liked to sleep out on the streets, or in the wintertime, stay in the night shelter program.  He said he did not want to go anywhere that someone is going to control what he does. He was not aggressive or agitated. He was cooperative other than that. CONDITION ON DISCHARGE:  Fair. PROGNOSIS:  Guarded since he has no desire to live in any type of controlled environment. PROCEDURE:  Zero. ASSESSMENT:  AXIS I:  Schizophrenia. AXIS II:  None. AXIS III:  None. DISPOSITION:  Discharge to self. Recommend followup at [facility]. MEDICATIONS:  1.  Olanzapine Zydis 5 mg q.a.m., #15, one refill. 2.  Olanzapine Zydis 15 mg tablet one at bedtime, #15, one refill. 3.  Benztropine 1 mg tablet one b.i.d., #30, one refill. 4.  Fluoxetine 40 mg tablet one daily, #15, one refill.       Valerie Boo MD      [id number]  D:  08/26/2019 12:07  T:  08/26/2019 12:14  JOB #:  [id number]

## 2019-08-26 NOTE — DISCHARGE INSTRUCTIONS
BEHAVIORAL HEALTH NURSING DISCHARGE NOTE      The following personal items collected during your admission are returned to you:   Dental Appliance: Dental Appliances: None  Vision:    Hearing Aid:    Jewelry: Jewelry: None  Clothing:    Other Valuables: Other Valuables: (sneakers,jeans,and T-shirt.)  Valuables sent to safe: Personal Items Sent to Safe: none      PATIENT INSTRUCTIONS:             The discharge information has been reviewed with the patient. The patient verbalized understanding.         Patient armband removed and shredded

## 2020-07-04 ENCOUNTER — APPOINTMENT (OUTPATIENT)
Dept: GENERAL RADIOLOGY | Age: 25
End: 2020-07-04
Attending: EMERGENCY MEDICINE
Payer: MEDICAID

## 2020-07-04 ENCOUNTER — HOSPITAL ENCOUNTER (EMERGENCY)
Age: 25
Discharge: HOME | End: 2020-07-04
Attending: EMERGENCY MEDICINE
Payer: MEDICAID

## 2020-07-04 VITALS
DIASTOLIC BLOOD PRESSURE: 68 MMHG | BODY MASS INDEX: 28 KG/M2 | HEART RATE: 90 BPM | HEIGHT: 71 IN | RESPIRATION RATE: 20 BRPM | SYSTOLIC BLOOD PRESSURE: 110 MMHG | OXYGEN SATURATION: 97 % | WEIGHT: 200 LBS | TEMPERATURE: 98.5 F

## 2020-07-04 DIAGNOSIS — S83.92XA SPRAIN OF LEFT KNEE, UNSPECIFIED LIGAMENT, INITIAL ENCOUNTER: Primary | ICD-10-CM

## 2020-07-04 PROCEDURE — 99283 EMERGENCY DEPT VISIT LOW MDM: CPT

## 2020-07-04 PROCEDURE — 73564 X-RAY EXAM KNEE 4 OR MORE: CPT

## 2020-07-04 NOTE — ED NOTES
I have reviewed discharge instructions with the patient. The patient verbalized understanding. Pt is weight bearing at time of dc. Pt in no distress at time of dc. Pt ambulates to ED lobby to wait for medicaid cab. Pt agreeable to dc plan.

## 2020-07-04 NOTE — ED PROVIDER NOTES
EMERGENCY DEPARTMENT HISTORY AND PHYSICAL EXAM    1:33 PM      Date: 7/4/2020  Patient Name: Dai Briscoe    History of Presenting Illness     Chief Complaint   Patient presents with    Knee Pain    Homeless         History Provided By: patient    Additional History (Context): Dai Briscoe is a 21 y.o. male presents with schizoaffective disorder and homelessness was walking today at 9:00 and his left knee popped and gave out. He now has severe pain. He is able to bear some weight. No high-energy trauma. He wants this evaluated. I reviewed the nursing note said he had concerns with a shelter tonight but at the time of my examination she declines case management or help finding shelters. He says all figure something out. Jose Morales PCP: Wojciech Sandoval MD    Chief Complaint:   Duration:    Timing:    Location:   Quality:   Severity:   Modifying Factors:   Associated Symptoms:       Current Outpatient Medications   Medication Sig Dispense Refill    benztropine (COGENTIN) 0.5 mg tablet Take 2 Tabs by mouth two (2) times a day. Indications: extrapyramidal symptoms caused by a medication 30 Tab 1    OLANZapine (ZYPREXA ZYDIS) 15 mg disintegrating tablet Take 1 Tab by mouth nightly. Indications: Schizophrenia 15 Tab 1    guaiFENesin ER (MUCINEX) 600 mg ER tablet Take 1 Tab by mouth two (2) times a day. Indications: congestion 30 Tab 1    traZODone (DESYREL) 50 mg tablet Take 1 Tab by mouth nightly as needed for Sleep. Indications: insomnia associated with depression 15 Tab 1    FLUoxetine (PROZAC) 40 mg capsule Take 1 Cap by mouth daily. Indications: Anxiousness associated with Depression 15 Cap 1    OLANZapine (ZYPREXA ZYDIS) 5 mg disintegrating tablet Take 1 Tab by mouth daily.  Indications: Schizophrenia 15 Tab 1       Past History     Past Medical History:  Past Medical History:   Diagnosis Date    Bipolar 1 disorder ([address]     Intermittent explosive disorder     Psychiatric disorder     schitzophrenic  Schizophrenia, schizo-affective ([facility]        Past Surgical History:  Past Surgical History:   Procedure Laterality Date    HX TONSIL AND ADENOIDECTOMY         Family History:  Family History   Problem Relation Age of Onset    Diabetes Mother     Sleep Apnea Mother        Social History:  Social History     Tobacco Use    Smoking status: Current Every Day Smoker     Packs/day: 1.00     Years: 10.00     Pack years: 10.00    Smokeless tobacco: Never Used   Substance Use Topics    Alcohol use: No    Drug use: Yes     Types: Marijuana     Comment: history of abuse of crack       Allergies:  No Known Allergies      Review of Systems     Review of Systems   Constitutional: Negative for diaphoresis and fever. HENT: Negative for congestion and sore throat. Eyes: Negative for pain and itching. Respiratory: Negative for cough and shortness of breath. Cardiovascular: Negative for chest pain and palpitations. Gastrointestinal: Negative for abdominal pain and diarrhea. Endocrine: Negative for polydipsia and polyuria. Genitourinary: Negative for dysuria and hematuria. Musculoskeletal: Positive for arthralgias. Negative for myalgias. Skin: Negative for rash and wound. Neurological: Negative for seizures and syncope. Hematological: Does not bruise/bleed easily. Psychiatric/Behavioral: Negative for agitation and hallucinations. Physical Exam       Patient Vitals for the past 12 hrs:   Temp Pulse Resp BP SpO2   07/04/20 1311 98.5 °F (36.9 °C) 90 20 110/68 97 %       Physical Exam  Vitals signs and nursing note reviewed. Constitutional:       Appearance: He is well-developed. HENT:      Head: Normocephalic and atraumatic. Eyes:      General: No scleral icterus. Conjunctiva/sclera: Conjunctivae normal.   Neck:      Musculoskeletal: Normal range of motion and neck supple. Vascular: No JVD. Cardiovascular:      Rate and Rhythm: Normal rate and regular rhythm.    Pulmonary: Effort: Pulmonary effort is normal. No respiratory distress. Musculoskeletal: Normal range of motion. General: No swelling, tenderness, deformity or signs of injury. Comments: Good range of motion of the knee while he seated in the wheelchair, no effusion, no point tenderness. Skin:     General: Skin is warm and dry. Neurological:      Mental Status: He is alert. Psychiatric:         Thought Content: Thought content normal.         Judgment: Judgment normal.           Diagnostic Study Results   Labs -  No results found for this or any previous visit (from the past 12 hour(s)). Radiologic Studies -   XR KNEE LT MIN 4 V    (Results Pending)     No results found. Medications ordered:   Medications - No data to display      Medical Decision Making   Initial Medical Decision Making and DDx:  Most likely knee sprain. Will get x-ray to rule out bony abnormality. ED Course: Progress Notes, Reevaluation, and Consults:     2:20 PM Pt reevaluated at this time. Discussed results and findings, as well as, diagnosis and plan for discharge. Follow up with doctors/services listed. Return to the emergency department for alarming symptoms. Pt verbalizes understanding and agreement with plan. All questions addressed. I reviewed multiple films of the left knee no acute fracture or bony malalignment. Reassessed the patient he is doing well seated in the recliner in the clinical decision area, no acute distress he is very happy to hear no bony malalignment does not require other assistance and is ready for discharge. I am the first provider for this patient. I reviewed the vital signs, available nursing notes, past medical history, past surgical history, family history and social history. Patient Vitals for the past 12 hrs:   Temp Pulse Resp BP SpO2   07/04/20 1311 98.5 °F (36.9 °C) 90 20 110/68 97 %       Vital Signs-Reviewed the patient's vital signs.     Pulse Oximetry Analysis, Cardiac Monitor, 12 lead ekg:    Interpreted by the EP. Records Reviewed: Nursing notes reviewed (Time of Review: 1:33 PM)    Procedures:   Critical Care Time:   Aspirin: (was aspirin given for stroke?)    Diagnosis     Clinical Impression:   1. Sprain of left knee, unspecified ligament, initial encounter        Disposition: Discharged      Follow-up Information     Follow up With Specialties Details Why [address] [facility], [address] [facility], [place] [address]  [phone]             Patient's Medications   Start Taking    No medications on file   Continue Taking    BENZTROPINE (COGENTIN) 0.5 MG TABLET    Take 2 Tabs by mouth two (2) times a day. Indications: extrapyramidal symptoms caused by a medication    FLUOXETINE (PROZAC) 40 MG CAPSULE    Take 1 Cap by mouth daily. Indications: Anxiousness associated with Depression    GUAIFENESIN ER (MUCINEX) 600 MG ER TABLET    Take 1 Tab by mouth two (2) times a day. Indications: congestion    OLANZAPINE (ZYPREXA ZYDIS) 15 MG DISINTEGRATING TABLET    Take 1 Tab by mouth nightly. Indications: Schizophrenia    OLANZAPINE (ZYPREXA ZYDIS) 5 MG DISINTEGRATING TABLET    Take 1 Tab by mouth daily. Indications: Schizophrenia    TRAZODONE (DESYREL) 50 MG TABLET    Take 1 Tab by mouth nightly as needed for Sleep.  Indications: insomnia associated with depression   These Medications have changed    No medications on file   Stop Taking    No medications on file     _______________________________    Notes:    Madhavi Asher MD using Dragon dictation      _______________________________

## 2020-07-04 NOTE — PROGRESS NOTES
Referral received to assist with shelter. Met with pt. He states he's been to shelters but they are closed today. List of shelters and [employer] provided to pt.

## 2020-07-04 NOTE — ED TRIAGE NOTES
Pt arrives via EMS with c/o Left knee pain, Pt states was walking earlier and the knee gave out and popped around 0900. Pt states also Homeless and looking for help to find a shelter. Pt denies SI, drug or ETOH use. Pt states smokes pot.

## 2020-07-04 NOTE — DISCHARGE INSTRUCTIONS
Patient Education        Knee Sprain: Care Instructions  Your Care Instructions     A knee sprain is one or more stretched, partly torn, or completely torn knee ligaments. Ligaments are bands of ropelike tissue that connect bone to bone and make the knee stable. The knee has four main ligaments. Knee sprains often happen because of a twisting or bending injury from sports such as skiing, basketball, soccer, or football. The knee turns one way while the lower or upper leg goes another way. A sprain also can happen when the knee is hit from the side or the front. If a knee ligament is slightly stretched, you will probably need only home treatment. You may need a splint or brace (immobilizer) for a partly torn ligament. A complete tear may need surgery. A minor knee sprain may take up to 6 weeks to heal, while a severe sprain may take months. Follow-up care is a key part of your treatment and safety. Be sure to make and go to all appointments, and call your doctor if you are having problems. It's also a good idea to know your test results and keep a list of the medicines you take. How can you care for yourself at home? · Follow instructions about how much weight you can put on your leg and how to walk with crutches. · Prop up your leg on a pillow when you ice it or anytime you sit or lie down for the next 3 days. Try to keep it above the level of your heart. This will help reduce swelling. · Put ice or a cold pack on your knee for 10 to 20 minutes at a time. Try to do this every 1 to 2 hours for the next 3 days (when you are awake) or until the swelling goes down. Put a thin cloth between the ice and your skin. Do not get the splint wet. · If you have an elastic bandage, make sure it is snug but not so tight that your leg is numb, tingles, or swells below the bandage. You can loosen the bandage if it is too tight. · Your doctor may recommend a brace (immobilizer) to support your knee while it heals.  Wear it as directed. · Ask your doctor if you can take an over-the-counter pain medicine, such as acetaminophen (Tylenol), ibuprofen (Advil, Motrin), or naproxen (Aleve). Be safe with medicines. Read and follow all instructions on the label. When should you call for help? [id number] anytime you think you may need emergency care. For example, call if:  · You have sudden chest pain and shortness of breath, or you cough up blood. Call your doctor now or seek immediate medical care if:  · You have increased or severe pain. · You cannot move your toes or ankle. · Your foot is cool or pale or changes color. · You have tingling, weakness, or numbness in your foot or leg. · Your splint or brace feels too tight. · You are unable to straighten the knee, or the knee \"locks. \"  · You have signs of a blood clot in your leg, such as:  ? Pain in your calf, back of the knee, thigh, or groin. ? Redness and swelling in your leg. Watch closely for changes in your health, and be sure to contact your doctor if:  · Your pain is not getting better or is getting worse. Where can you learn more? Go to http://ananya-clarita.info/  Enter N406 in the search box to learn more about \"Knee Sprain: Care Instructions. \"  Current as of: March 2, 2020               Content Version: 12.5  © 6922-8709 [employer]. Care instructions adapted under license by [employer] (which disclaims liability or warranty for this information). If you have questions about a medical condition or this instruction, always ask your healthcare professional. [place] [address] any warranty or liability for your use of this information.

## 2021-03-01 ENCOUNTER — VIRTUAL VISIT (OUTPATIENT)
Dept: FAMILY MEDICINE CLINIC | Age: 26
End: 2021-03-01
Payer: MEDICAID

## 2021-03-01 DIAGNOSIS — G89.29 CHRONIC PAIN OF LEFT KNEE: ICD-10-CM

## 2021-03-01 DIAGNOSIS — F25.0 SCHIZOAFFECTIVE DISORDER, BIPOLAR TYPE (HCC): Primary | ICD-10-CM

## 2021-03-01 DIAGNOSIS — M25.562 CHRONIC PAIN OF LEFT KNEE: ICD-10-CM

## 2021-03-01 DIAGNOSIS — F20.9 SCHIZOPHRENIA, UNSPECIFIED TYPE (HCC): ICD-10-CM

## 2021-03-01 DIAGNOSIS — G47.00 INSOMNIA, UNSPECIFIED TYPE: ICD-10-CM

## 2021-03-01 PROBLEM — F19.94 SUBSTANCE INDUCED MOOD DISORDER (HCC): Status: ACTIVE | Noted: 2021-01-20

## 2021-03-01 PROCEDURE — 99203 OFFICE O/P NEW LOW 30 MIN: CPT | Performed by: NURSE PRACTITIONER

## 2021-03-01 RX ORDER — VENLAFAXINE HYDROCHLORIDE 150 MG/1
150 CAPSULE, EXTENDED RELEASE ORAL DAILY
Qty: 30 CAP | Refills: 0 | Status: SHIPPED | OUTPATIENT
Start: 2021-03-01 | End: 2021-03-31

## 2021-03-01 RX ORDER — TRAZODONE HYDROCHLORIDE 100 MG/1
TABLET ORAL
Qty: 30 TAB | Refills: 0 | Status: ON HOLD | OUTPATIENT
Start: 2021-03-01 | End: 2021-05-17 | Stop reason: SDUPTHER

## 2021-03-01 RX ORDER — VENLAFAXINE HYDROCHLORIDE 150 MG/1
150 CAPSULE, EXTENDED RELEASE ORAL DAILY
COMMUNITY
Start: 2021-01-30 | End: 2021-03-01 | Stop reason: SDUPTHER

## 2021-03-01 RX ORDER — LANOLIN ALCOHOL/MO/W.PET/CERES
3 CREAM (GRAM) TOPICAL
COMMUNITY
Start: 2021-01-29 | End: 2021-03-01 | Stop reason: SDUPTHER

## 2021-03-01 RX ORDER — TRAZODONE HYDROCHLORIDE 100 MG/1
TABLET ORAL
COMMUNITY
Start: 2021-01-29 | End: 2021-03-01 | Stop reason: SDUPTHER

## 2021-03-01 RX ORDER — ARIPIPRAZOLE 15 MG/1
30 TABLET ORAL DAILY
Qty: 60 TAB | Refills: 0 | Status: SHIPPED | OUTPATIENT
Start: 2021-03-01 | End: 2021-03-31

## 2021-03-01 RX ORDER — ARIPIPRAZOLE 15 MG/1
30 TABLET ORAL DAILY
COMMUNITY
Start: 2021-01-29 | End: 2021-03-01 | Stop reason: SDUPTHER

## 2021-03-01 RX ORDER — LANOLIN ALCOHOL/MO/W.PET/CERES
3 CREAM (GRAM) TOPICAL
Qty: 30 TAB | Refills: 2 | Status: SHIPPED | OUTPATIENT
Start: 2021-03-01 | End: 2021-05-17

## 2021-03-01 NOTE — PROGRESS NOTES
HISTORY OF PRESENT ILLNESS  Mercedes Obrien is a 31 y.o. male seen to establish care  HPI  Patient appears to have a history of bipolar disorder 1, intermittent explosive disorder, schizophrenic and schizoaffective disorders. I saw patient and his  Mr. Amber Gamble from [employer] virtually. Patient is requesting some refills related to his mental health disorders. Mr. Amber Gamble reports that patient has an appointment with the community services [facility] in May 2021 but patient needs refills at this time. Patient does receive money from disability and has money in the bank but due to losing his ID he is not able to get to his funds. Per . Amber Gamble patient has an appointment with [facility] March 14, 2021 to get a new ID. Patient was admitted January 20-29, 2021 at [facility].  He then reports he was transferred to an inpatient facility in [facility]. He has since left that facility and now is living in an apartment that [employer]. supplies, they offer him skill building and help him with appointments related to his health and mental health. Patient denies fever, chills, shortness of breath, chest pain, abdomen pain or dark tarry stool. Left knee  Patient appears to have been seen July 4, 2020, at [facility] ED for knee pain and homelessness. Patient reports that his knee still bothers him at times and he says it \"feels like it slips out of joint. \"  It appears he was diagnosed with a left knee sprain, unspecified ligament. X-ray of his left knee indicated no specific abnormity. I will order physical therapy to strengthen the muscle around patient's left knee. Review of Systems   Constitutional: Negative for chills, fever and malaise/fatigue. HENT: Negative for congestion, ear pain and sore throat. Eyes: Negative. Respiratory: Negative for cough, shortness of breath and wheezing.     Cardiovascular: Negative for chest pain, palpitations and leg swelling. Gastrointestinal: Negative. Genitourinary: Negative. Musculoskeletal: Negative. Neurological: Negative for dizziness and headaches. Endo/Heme/Allergies: Negative. Psychiatric/Behavioral: Positive for depression and hallucinations. Negative for suicidal ideas. History of depression; hears voices that say negative things about the patient such as \"you suggest crawl in a hole \"     There were no vitals taken for this visit. Blood Pressure 140/66 01/29/2021 10:28 PM EST     Pulse 86 01/29/2021 10:28 PM EST     Temperature 36.2 °C (97.1 °F) 01/29/2021 10:28 PM EST     Respiratory Rate 16 01/29/2021 10:28 PM EST     Oxygen Saturation 97% 01/29/2021 10:28 PM EST     Inhaled Oxygen Concentration - -     Weight 85 kg (187 lb 6.4 oz) 01/20/2021 11:05 PM EST     Height 180.3 cm (5' 11\") 01/20/2021 11:05 PM EST     Body Mass Index 26.14 01/20/2021 11:05 PM EST      Labs  1/21/2021 A1C 5.3%  TSH 1.00  LIPID COMPLETE PANEL (01/21/2021 6:55 AM EST)  LIPID COMPLETE PANEL (01/21/2021 6:55 AM EST)   Component Value Ref Range Performed At Pathologist Signature   Cholesterol 130 110 - 200 mg/dL [facility]     Triglyceride 99 40 - 149 mg/dL [facility]     HDL 34 (L) >=40 mg/dL [facility]     Cholesterol/HDL 3.8 0.0 - 5.0 [facility]     Non-HDL Cholesterol 96 <130 mg/dL [facility]     LDL CALCULATION 76 50 - 99 mg/dL [facility]     VLDL CALCULATION 20 8 - 30 mg/dL [facility]     LDL/HDL Ratio 2.2   [facility]         Physical Exam  Constitutional:       Appearance: Normal appearance. He is normal weight. He is not ill-appearing. HENT:      Head: Normocephalic and atraumatic. Nose: Nose normal.   Eyes:      General:         Right eye: No discharge. Left eye: No discharge.       Conjunctiva/sclera: Conjunctivae normal.   Pulmonary:      Effort: Pulmonary effort is normal.   Skin:     Coloration: Skin is not jaundiced or pale. Neurological:      Mental Status: He is alert and oriented to person, place, and time. Psychiatric:         Mood and Affect: Mood normal.         Behavior: Behavior normal.         Thought Content: Thought content normal.         Judgment: Judgment normal.       Past Medical History:   Diagnosis Date    ADHD     Anxiety     Asperger's disorder     [facility] disorder ([facility]     Depression     Intermittent explosive disorder     Psychiatric disorder     schitzophrenic    Schizophrenia, schizo-affective ([facility]      Patient Active Problem List   Diagnosis Code    Schizoaffective disorder ([facility]) F25.9    Schizophrenia ([facility] F20.9    Visual hallucination R44.1    Cigarette nicotine dependence without complication P47.649    Substance induced mood disorder [facility] F19.94     Current Outpatient Medications on File Prior to Visit   Medication Sig Dispense Refill    [DISCONTINUED] melatonin 3 mg tablet Take 3 mg by mouth.  [DISCONTINUED] venlafaxine-SR (EFFEXOR-XR) 150 mg capsule Take 150 mg by mouth daily.  [DISCONTINUED] traZODone (DESYREL) 100 mg tablet TAKE 1 TABLET BY MOUTH AT BEDTIME      [DISCONTINUED] ARIPiprazole (ABILIFY) 15 mg tablet Take 30 mg by mouth daily.  [DISCONTINUED] benztropine (COGENTIN) 0.5 mg tablet Take 2 Tabs by mouth two (2) times a day. Indications: extrapyramidal symptoms caused by a medication 30 Tab 1    [DISCONTINUED] OLANZapine (ZYPREXA ZYDIS) 15 mg disintegrating tablet Take 1 Tab by mouth nightly. Indications: Schizophrenia 15 Tab 1    [DISCONTINUED] guaiFENesin ER (MUCINEX) 600 mg ER tablet Take 1 Tab by mouth two (2) times a day. Indications: congestion 30 Tab 1    [DISCONTINUED] traZODone (DESYREL) 50 mg tablet Take 1 Tab by mouth nightly as needed for Sleep. Indications: insomnia associated with depression 15 Tab 1    [DISCONTINUED] FLUoxetine (PROZAC) 40 mg capsule Take 1 Cap by mouth daily.  Indications: Anxiousness associated with Depression 15 Cap 1    [DISCONTINUED] OLANZapine (ZYPREXA ZYDIS) 5 mg disintegrating tablet Take 1 Tab by mouth daily. Indications: Schizophrenia 15 Tab 1     No current facility-administered medications on file prior to visit. ASSESSMENT and PLAN    ICD-10-CM ICD-9-CM    1. Schizoaffective disorder, bipolar type ([facility]  F25.0 295.70 ARIPiprazole (ABILIFY) 15 mg tablet      traZODone (DESYREL) 100 mg tablet      venlafaxine-SR (EFFEXOR-XR) 150 mg capsule   2. Insomnia, unspecified type  G47.00 780.52 melatonin 3 mg tablet   3. Schizophrenia, unspecified type ([facility]  F20.9 295.90 ARIPiprazole (ABILIFY) 15 mg tablet      venlafaxine-SR (EFFEXOR-XR) 150 mg capsule   4. Chronic pain of left knee  M25.562 719.46 REFERRAL TO PHYSICAL THERAPY    G89.29 338.29      Follow-up and Dispositions    · Return in about 2 weeks (around 3/15/2021).         50% of 30 minutes spent on counseling and managing of care

## 2021-03-19 ENCOUNTER — TELEPHONE (OUTPATIENT)
Dept: FAMILY MEDICINE CLINIC | Age: 26
End: 2021-03-19

## 2021-03-19 NOTE — TELEPHONE ENCOUNTER
Called patient to schedule an appointment for a complete physical on number given [phone].  Unable to leave a vm

## 2021-05-11 ENCOUNTER — HOSPITAL ENCOUNTER (INPATIENT)
Age: 26
LOS: 6 days | Discharge: HOME | DRG: 750 | End: 2021-05-17
Attending: EMERGENCY MEDICINE | Admitting: PSYCHIATRY & NEUROLOGY
Payer: MEDICAID

## 2021-05-11 DIAGNOSIS — F20.3 UNDIFFERENTIATED SCHIZOPHRENIA (HCC): ICD-10-CM

## 2021-05-11 DIAGNOSIS — R45.851 SUICIDAL IDEATION: Primary | ICD-10-CM

## 2021-05-11 DIAGNOSIS — F25.0 SCHIZOAFFECTIVE DISORDER, BIPOLAR TYPE (HCC): ICD-10-CM

## 2021-05-11 LAB
ALBUMIN SERPL-MCNC: 3.9 G/DL (ref 3.4–5)
ALBUMIN/GLOB SERPL: 1.2 {RATIO} (ref 0.8–1.7)
ALP SERPL-CCNC: 114 U/L (ref 45–117)
ALT SERPL-CCNC: 59 U/L (ref 16–61)
AMPHET UR QL SCN: NEGATIVE
ANION GAP SERPL CALC-SCNC: 5 MMOL/L (ref 3–18)
AST SERPL-CCNC: 27 U/L (ref 10–38)
BARBITURATES UR QL SCN: NEGATIVE
BASOPHILS # BLD: 0.1 K/UL (ref 0–0.1)
BASOPHILS NFR BLD: 1 % (ref 0–2)
BENZODIAZ UR QL: NEGATIVE
BILIRUB SERPL-MCNC: 0.3 MG/DL (ref 0.2–1)
BUN SERPL-MCNC: 18 MG/DL (ref 7–18)
BUN/CREAT SERPL: 23 (ref 12–20)
CALCIUM SERPL-MCNC: 9.1 MG/DL (ref 8.5–10.1)
CANNABINOIDS UR QL SCN: POSITIVE
CHLORIDE SERPL-SCNC: 109 MMOL/L (ref 100–111)
CO2 SERPL-SCNC: 26 MMOL/L (ref 21–32)
COCAINE UR QL SCN: NEGATIVE
COVID-19 RAPID TEST, COVR: NOT DETECTED
CREAT SERPL-MCNC: 0.79 MG/DL (ref 0.6–1.3)
DIFFERENTIAL METHOD BLD: NORMAL
EOSINOPHIL # BLD: 0.1 K/UL (ref 0–0.4)
EOSINOPHIL NFR BLD: 2 % (ref 0–5)
ERYTHROCYTE [DISTWIDTH] IN BLOOD BY AUTOMATED COUNT: 12.7 % (ref 11.6–14.5)
ETHANOL SERPL-MCNC: <3 MG/DL (ref 0–3)
GLOBULIN SER CALC-MCNC: 3.3 G/DL (ref 2–4)
GLUCOSE SERPL-MCNC: 122 MG/DL (ref 74–99)
HCT VFR BLD AUTO: 44.4 % (ref 36–48)
HDSCOM,HDSCOM: ABNORMAL
HGB BLD-MCNC: 15.5 G/DL (ref 13–16)
LYMPHOCYTES # BLD: 1.7 K/UL (ref 0.9–3.6)
LYMPHOCYTES NFR BLD: 24 % (ref 21–52)
MCH RBC QN AUTO: 31.8 PG (ref 24–34)
MCHC RBC AUTO-ENTMCNC: 34.9 G/DL (ref 31–37)
MCV RBC AUTO: 91 FL (ref 74–97)
METHADONE UR QL: NEGATIVE
MONOCYTES # BLD: 0.5 K/UL (ref 0.05–1.2)
MONOCYTES NFR BLD: 7 % (ref 3–10)
NEUTS SEG # BLD: 4.7 K/UL (ref 1.8–8)
NEUTS SEG NFR BLD: 67 % (ref 40–73)
OPIATES UR QL: NEGATIVE
PCP UR QL: NEGATIVE
PLATELET # BLD AUTO: 164 K/UL (ref 135–420)
PMV BLD AUTO: 9.8 FL (ref 9.2–11.8)
POTASSIUM SERPL-SCNC: 3.9 MMOL/L (ref 3.5–5.5)
PROT SERPL-MCNC: 7.2 G/DL (ref 6.4–8.2)
RBC # BLD AUTO: 4.88 M/UL (ref 4.35–5.65)
SODIUM SERPL-SCNC: 140 MMOL/L (ref 136–145)
SOURCE, COVRS: NORMAL
WBC # BLD AUTO: 7 K/UL (ref 4.6–13.2)

## 2021-05-11 PROCEDURE — 87635 SARS-COV-2 COVID-19 AMP PRB: CPT

## 2021-05-11 PROCEDURE — 80307 DRUG TEST PRSMV CHEM ANLYZR: CPT

## 2021-05-11 PROCEDURE — 85025 COMPLETE CBC W/AUTO DIFF WBC: CPT

## 2021-05-11 PROCEDURE — 99282 EMERGENCY DEPT VISIT SF MDM: CPT

## 2021-05-11 PROCEDURE — 65220000003 HC RM SEMIPRIVATE PSYCH

## 2021-05-11 PROCEDURE — 82077 ASSAY SPEC XCP UR&BREATH IA: CPT

## 2021-05-11 PROCEDURE — 80053 COMPREHEN METABOLIC PANEL: CPT

## 2021-05-11 RX ORDER — ZIPRASIDONE HYDROCHLORIDE 20 MG/1
20 CAPSULE ORAL 2 TIMES DAILY WITH MEALS
Status: DISCONTINUED | OUTPATIENT
Start: 2021-05-11 | End: 2021-05-11

## 2021-05-11 NOTE — ED PROVIDER NOTES
EMERGENCY DEPARTMENT HISTORY AND PHYSICAL EXAM    Date: 5/11/2021  Patient Name: Robin Paez    History of Presenting Illness     Chief Complaint   Patient presents with    Suicidal         History Provided By: Patient      Additional History (Context): Robin Paez is a 31 y.o. male with ADHD, anxiety, schizophrenia, noncompliant with outpatient psychiatry who presents with suicidal x3 days. He is not homeless. He has not used marijuana recently drinks infrequently and still smokes tobacco.  He is compliant with his medications were never he can get them but he does not follow with an outpatient psychiatrist.  He gets his prescriptions from the hospitals. PCP: Hortensia Thakkar NP    Current Facility-Administered Medications   Medication Dose Route Frequency Provider Last Rate Last Admin    ziprasidone (GEODON) capsule 20 mg  20 mg Oral BID WITH MEALS Asha Sosa MD         Current Outpatient Medications   Medication Sig Dispense Refill    melatonin 3 mg tablet Take 1 Tab by mouth nightly for 90 days.  30 Tab 2    traZODone (DESYREL) 100 mg tablet TAKE 1 TABLET BY MOUTH AT BEDTIME 30 Tab 0       Past History     Past Medical History:  Past Medical History:   Diagnosis Date    ADHD     Anxiety     Asperger's disorder     Bipolar 1 disorder (HCC)     Depression     Intermittent explosive disorder     Psychiatric disorder     schitzophrenic    Schizophrenia, schizo-affective ([facility]        Past Surgical History:  Past Surgical History:   Procedure Laterality Date    HX TONSIL AND ADENOIDECTOMY         Family History:  Family History   Problem Relation Age of Onset    Diabetes Mother     Sleep Apnea Mother        Social History:  Social History     Tobacco Use    Smoking status: Current Every Day Smoker     Packs/day: 1.00     Years: 10.00     Pack years: 10.00    Smokeless tobacco: Never Used   Substance Use Topics    Alcohol use: No    Drug use: Yes     Types: Marijuana     Comment: history of abuse of crack       Allergies:  No Known Allergies      Review of Systems   Review of Systems   Unable to perform ROS: Psychiatric disorder   Psychiatric/Behavioral: Positive for suicidal ideas. Negative for behavioral problems and sleep disturbance. All Other Systems Negative  Physical Exam     Vitals:    05/11/21 1556   BP: (!) 144/86   Pulse: (!) 117   Resp: 16   Temp: 99.1 °F (37.3 °C)   SpO2: 100%     Physical Exam  Vitals signs and nursing note reviewed. Constitutional:       General: He is not in acute distress. Appearance: He is well-developed. He is not ill-appearing, toxic-appearing or diaphoretic. HENT:      Head: Normocephalic and atraumatic. Neck:      Musculoskeletal: Normal range of motion and neck supple. Thyroid: No thyromegaly. Vascular: No carotid bruit. Trachea: No tracheal deviation. Cardiovascular:      Rate and Rhythm: Normal rate and regular rhythm. Heart sounds: Normal heart sounds. No murmur. No friction rub. No gallop. Pulmonary:      Effort: Pulmonary effort is normal. No respiratory distress. Breath sounds: Normal breath sounds. No stridor. No wheezing or rales. Chest:      Chest wall: No tenderness. Abdominal:      General: There is no distension. Palpations: Abdomen is soft. There is no mass. Tenderness: There is no abdominal tenderness. There is no guarding or rebound. Musculoskeletal: Normal range of motion. Skin:     General: Skin is warm and dry. Coloration: Skin is not pale. Neurological:      Mental Status: He is alert. Psychiatric:         Speech: Speech normal.         Behavior: Behavior normal.         Thought Content:  Thought content normal.         Judgment: Judgment normal.            Diagnostic Study Results     Labs -     Recent Results (from the past 12 hour(s))   COVID-19 RAPID TEST    Collection Time: 05/11/21  3:09 PM   Result Value Ref Range    Specimen source Nasopharyngeal COVID-19 rapid test Not detected NOTD     METABOLIC PANEL, COMPREHENSIVE    Collection Time: 05/11/21  3:59 PM   Result Value Ref Range    Sodium 140 136 - 145 mmol/L    Potassium 3.9 3.5 - 5.5 mmol/L    Chloride 109 100 - 111 mmol/L    CO2 26 21 - 32 mmol/L    Anion gap 5 3.0 - 18 mmol/L    Glucose 122 (H) 74 - 99 mg/dL    BUN 18 7.0 - 18 MG/DL    Creatinine 0.79 0.6 - 1.3 MG/DL    BUN/Creatinine ratio 23 (H) 12 - 20      GFR est AA >60 >60 ml/min/1.73m2    GFR est non-AA >60 >60 ml/min/1.73m2    Calcium 9.1 8.5 - 10.1 MG/DL    Bilirubin, total 0.3 0.2 - 1.0 MG/DL    ALT (SGPT) 59 16 - 61 U/L    AST (SGOT) 27 10 - 38 U/L    Alk. phosphatase 114 45 - 117 U/L    Protein, total 7.2 6.4 - 8.2 g/dL    Albumin 3.9 3.4 - 5.0 g/dL    Globulin 3.3 2.0 - 4.0 g/dL    A-G Ratio 1.2 0.8 - 1.7     CBC WITH AUTOMATED DIFF    Collection Time: 05/11/21  3:59 PM   Result Value Ref Range    WBC 7.0 4.6 - 13.2 K/uL    RBC 4.88 4.35 - 5.65 M/uL    HGB 15.5 13.0 - 16.0 g/dL    HCT 44.4 36.0 - 48.0 %    MCV 91.0 74.0 - 97.0 FL    MCH 31.8 24.0 - 34.0 PG    MCHC 34.9 31.0 - 37.0 g/dL    RDW 12.7 11.6 - 14.5 %    PLATELET 018 961 - 184 K/uL    MPV 9.8 9.2 - 11.8 FL    NEUTROPHILS 67 40 - 73 %    LYMPHOCYTES 24 21 - 52 %    MONOCYTES 7 3 - 10 %    EOSINOPHILS 2 0 - 5 %    BASOPHILS 1 0 - 2 %    ABS. NEUTROPHILS 4.7 1.8 - 8.0 K/UL    ABS. LYMPHOCYTES 1.7 0.9 - 3.6 K/UL    ABS. MONOCYTES 0.5 0.05 - 1.2 K/UL    ABS. EOSINOPHILS 0.1 0.0 - 0.4 K/UL    ABS.  BASOPHILS 0.1 0.0 - 0.1 K/UL    DF AUTOMATED     ETHYL ALCOHOL    Collection Time: 05/11/21  3:59 PM   Result Value Ref Range    ALCOHOL(ETHYL),SERUM <3 0 - 3 MG/DL   DRUG SCREEN, URINE    Collection Time: 05/11/21  3:59 PM   Result Value Ref Range    BENZODIAZEPINES Negative NEG      BARBITURATES Negative NEG      THC (TH-CANNABINOL) Positive (A) NEG      OPIATES Negative NEG      PCP(PHENCYCLIDINE) Negative NEG      COCAINE Negative NEG      AMPHETAMINES Negative NEG      METHADONE Negative NEG      HDSCOM (NOTE)        Radiologic Studies -   No orders to display     CT Results  (Last 48 hours)    None        CXR Results  (Last 48 hours)    None            Medical Decision Making   I am the first provider for this patient. I reviewed the vital signs, available nursing notes, past medical history, past surgical history, family history and social history. Vital Signs-Reviewed the patient's vital signs. Provider Notes (Medical Decision Making): Clear medically present to crisis. 5:39 PM  Patient is medically clear spoke with crisis. 7:57 PM  Evaluated by crisis who is recommending admission. MED RECONCILIATION:  Current Facility-Administered Medications   Medication Dose Route Frequency    ziprasidone (GEODON) capsule 20 mg  20 mg Oral BID WITH MEALS     Current Outpatient Medications   Medication Sig    melatonin 3 mg tablet Take 1 Tab by mouth nightly for 90 days.  traZODone (DESYREL) 100 mg tablet TAKE 1 TABLET BY MOUTH AT BEDTIME       Disposition:  admit      Diagnosis     Clinical Impression:   1. Suicidal ideation    2. Undifferentiated schizophrenia ([facility]      Update at 9:30 PM: Patient has been accepted for admission to this facility's psychiatric floor, Dr. Ming Cedeno. I personally saw and examined the patient. I have reviewed and agree with the MLP's findings, including all diagnostic interpretations, and plans as written. I was present during the key portions of separately billed procedures.     Adrianna Galaviz MD

## 2021-05-12 PROBLEM — F14.20 COCAINE USE DISORDER, MODERATE, DEPENDENCE (HCC): Status: ACTIVE | Noted: 2021-05-12

## 2021-05-12 PROBLEM — F12.20 CANNABIS USE DISORDER, MODERATE, DEPENDENCE (HCC): Status: ACTIVE | Noted: 2021-05-12

## 2021-05-12 PROBLEM — F25.1 SCHIZOAFFECTIVE DISORDER, DEPRESSIVE TYPE (HCC): Status: ACTIVE | Noted: 2018-04-01

## 2021-05-12 PROBLEM — F19.94 SUBSTANCE INDUCED MOOD DISORDER (HCC): Status: RESOLVED | Noted: 2021-01-20 | Resolved: 2021-05-12

## 2021-05-12 PROBLEM — F20.9 SCHIZOPHRENIA (HCC): Status: RESOLVED | Noted: 2019-08-20 | Resolved: 2021-05-12

## 2021-05-12 PROCEDURE — 65220000003 HC RM SEMIPRIVATE PSYCH

## 2021-05-12 PROCEDURE — 74011250637 HC RX REV CODE- 250/637: Performed by: PSYCHIATRY & NEUROLOGY

## 2021-05-12 RX ORDER — HALOPERIDOL 5 MG/ML
5 INJECTION INTRAMUSCULAR
Status: DISCONTINUED | OUTPATIENT
Start: 2021-05-12 | End: 2021-05-17 | Stop reason: HOSPADM

## 2021-05-12 RX ORDER — ARIPIPRAZOLE 10 MG/1
10 TABLET ORAL DAILY
Status: DISCONTINUED | OUTPATIENT
Start: 2021-05-12 | End: 2021-05-13

## 2021-05-12 RX ORDER — LORAZEPAM 2 MG/ML
1-2 INJECTION INTRAMUSCULAR
Status: DISCONTINUED | OUTPATIENT
Start: 2021-05-12 | End: 2021-05-17 | Stop reason: HOSPADM

## 2021-05-12 RX ORDER — VENLAFAXINE HYDROCHLORIDE 75 MG/1
75 CAPSULE, EXTENDED RELEASE ORAL
Status: DISCONTINUED | OUTPATIENT
Start: 2021-05-12 | End: 2021-05-13

## 2021-05-12 RX ORDER — HYDROXYZINE PAMOATE 50 MG/1
50 CAPSULE ORAL
Status: DISCONTINUED | OUTPATIENT
Start: 2021-05-12 | End: 2021-05-17 | Stop reason: HOSPADM

## 2021-05-12 RX ORDER — BENZTROPINE MESYLATE 1 MG/ML
1 INJECTION INTRAMUSCULAR; INTRAVENOUS
Status: DISCONTINUED | OUTPATIENT
Start: 2021-05-12 | End: 2021-05-17 | Stop reason: HOSPADM

## 2021-05-12 RX ORDER — ACETAMINOPHEN 325 MG/1
650 TABLET ORAL
Status: DISCONTINUED | OUTPATIENT
Start: 2021-05-12 | End: 2021-05-17 | Stop reason: HOSPADM

## 2021-05-12 RX ORDER — BENZTROPINE MESYLATE 1 MG/1
1 TABLET ORAL
Status: DISCONTINUED | OUTPATIENT
Start: 2021-05-12 | End: 2021-05-17 | Stop reason: HOSPADM

## 2021-05-12 RX ORDER — TRAZODONE HYDROCHLORIDE 100 MG/1
100 TABLET ORAL
Status: DISCONTINUED | OUTPATIENT
Start: 2021-05-12 | End: 2021-05-17 | Stop reason: HOSPADM

## 2021-05-12 RX ORDER — HALOPERIDOL 5 MG/1
5 TABLET ORAL
Status: DISCONTINUED | OUTPATIENT
Start: 2021-05-12 | End: 2021-05-17 | Stop reason: HOSPADM

## 2021-05-12 RX ADMIN — TRAZODONE HYDROCHLORIDE 100 MG: 100 TABLET ORAL at 20:34

## 2021-05-12 RX ADMIN — ACETAMINOPHEN 650 MG: 325 TABLET ORAL at 12:21

## 2021-05-12 RX ADMIN — TRAZODONE HYDROCHLORIDE 100 MG: 100 TABLET ORAL at 01:00

## 2021-05-12 RX ADMIN — ARIPIPRAZOLE 10 MG: 10 TABLET ORAL at 08:10

## 2021-05-12 RX ADMIN — HALOPERIDOL 5 MG: 5 TABLET ORAL at 01:00

## 2021-05-12 RX ADMIN — VENLAFAXINE HYDROCHLORIDE 75 MG: 75 CAPSULE, EXTENDED RELEASE ORAL at 08:08

## 2021-05-12 NOTE — BSMART NOTE
OCCUPATIONAL THERAPY PROGRESS NOTE Group time:2083 The patient did not awaken/get up when called for group.

## 2021-05-12 NOTE — GROUP NOTE
CORTNEY  GROUP DOCUMENTATION INDIVIDUAL Group Therapy Note Date: 5/12/2021 Group Start Time: 8046 Group End Time: 6927 Group Topic: Social Work Group [facility] 1 ADULT CHEM DEP [place], [address]  Group Therapy Note Attendees: 4 Attendance: Did not attend Delisa Olmedo

## 2021-05-12 NOTE — BSMART NOTE
ART THERAPY GROUP PROGRESS NOTE Group time:10:15 The patient did not awaken/get up when called for group.

## 2021-05-12 NOTE — BSMART NOTE
Comprehensive Assessment Integrated Summary Patient is a 31year old male who presented to the emergency room with c/o \"I feel like killing myself. I don't want to live, anymore. My mother just  from getting the '[employer] [employer] and  [address]' shot. Plus, my 12 year brother suffers from Ella. \" Patient stated that he does not live with his brother and he lives in [place], [facility]. Patient stated that he is \"depressed and suicidal with a plan to cut myself. \" Patient also stated that he hears \"voices telling me to hurt myself and others. \" Patient verbalized that he has never tried to harm other people, and he ignore the voices. Patient voiced that he sometimes sees boys, girls, and people getting killed when asked about hallucinations. Patient denied thoughts of harm towards others. Patient said that he has difficulty sleeping, and he take Trazodone to help with sleep, but Trazodone does not work. Patient verbalized to ED-RN that his brother who suffers with Ella lives in [place]. Mental Status Exam 
 
The patient's appearance is unkempt. The patient's behavior calm, cooperative, pleasant. The patient is oriented to time, place, person and situation. The patient's speech shows no evidence of impairment. The patient's mood is depressed. The range of affect is flat and slightly tearful. The patient's thought content demonstrates no evidence of impairment. The thought process shows no evidence of impairment. The patient's perception demonstrated changes in the following:  auditory  visual hallucinations. The patient's memory shows no evidence of impairment. The patient's appetite shows no evidence of impairment. The patient's sleep has evidence of insomnia. DME: None Access to weapons: Denied Housing: I live at [address] [place] [address], [facility]" Legal Issues: Denied Education: \"12th grade, IEP\" Medications: \"Effexor  mg daily, Abilify 30 mg daily, Trazodone 100 mg every bedtime\" Substance Abuse: \" I used to smoke meth for 3 years, snorted cocaine for 5 years, heroin IV for 2 years, and I smoke crack; last time smoking crack was last week. \" Outpatient Care: \"[facility]\" Inpatient Services: \"[facility], [facility], [facility]\" Contact/Support Person: \"None\" Disposition Patient is receptive to voluntary admission at [facility]; discussed with on-call psychiatrist, admission orders received, and report called to charge nurse.  
 
Sunshine Reyes, RN, BSN

## 2021-05-12 NOTE — PROGRESS NOTES
Patient did come out for nurse's group this morning. He was able to participate and then he returned to his room. He states he is suicidal but contracts for safety. He also states he was hearing voices. He was medication compliant.

## 2021-05-12 NOTE — BSMART NOTE
History: Shae Castaneda is a 31 y.o. male with ADHD, anxiety, schizophrenia, noncompliant with outpatient psychiatry who presents with suicidal x3 days. He is not homeless. He has not used marijuana recently drinks infrequently and still smokes tobacco.  He is compliant with his medications were never he can get them but he does not follow with an outpatient psychiatrist.  He gets his prescriptions from the hospitals. SW attempted contact with patient as he remained in bed. Patient did not respond to this writers efforts. SW will continue to monitor patient and will assist patient with discharge.  
 
Sarai Zheng, [id number]

## 2021-05-12 NOTE — ED NOTES
TRANSFER - OUT REPORT:    Verbal report given to Savannah Maldonado (name) on Jairo Bloom  being transferred to [facility] (unit) for routine progression of care       Report consisted of patients Situation, Background, Assessment and   Recommendations(SBAR). Information from the following report(s) SBAR, Kardex, ED Summary, [facility] and Recent Results was reviewed with the receiving nurse. Lines:       Opportunity for questions and clarification was provided.       Patient transported with:   [employer]

## 2021-05-12 NOTE — BH NOTES
Patient admitted to [address] unit for depression with SI (pt denied having a plan) on a voluntary status. Patient is currently alert and oriented x 4 and he is able to recall events that led to admission. Jose reported, \"I have been doing good. I ran out of my meds. That's why I'm here. I have been calling my psychiatrist (Erik Sheldon), but I haven't been able to speak to anyone. I have an appointment May 17 at 1230 for my medication. \"  Patient reported that he has attempted suicide \"at least 20 times\" in the past.  Pt reported that his current stressors are: \"My mom just . My brother is fighting COVID, I haven't got my checks yet. \"  Pt reports [facility] \"Just loud. Telling me to kill myself. \"  Pt endorses SI, but agrees to talk with staff. Pt explained, \"I have been doing my coping skills that I have learned from my past admissions: \"Tapping (showed how he taps his wrist), singing, talking with friends. But then I just started feeling really bad. I walked all the way up to [facility] from my new house. \" Patient reported that he is tired, but has not had much sleep. \"I'm going to ask her to increase my Trazodone. \"  Pt again explained that he was doing well, \"I haven't done any drugs in 3 months, but right now it's so hard not too. I do smoke weed, but nothing else. \"    Patient's current mental status is \"I'm doing fine. \"  Pt was informed of medication ordered and asked if he would like to have medication to help with auditory hallucinations. Pt agreed to take medication. Pt was encouraged to sit in day area for a while to let medication work. While in day area pt talked with nurse and reported that he was feeling safe to go take a shower and try to sleep. Encouraged pt to speak with staff whenever feeling unsafe. Pt was educated on safety rounds. Pt was provided with 2 meals and ate 200%. Pt reported that he suffers from night terrors. Patient was oriented to unit and rules. Patient was checked for contraband. Patient placed on suicide precautions where rounds will be monitored every 15 minutes for safety. Opportunity for questions provided. All needs assessed. RN will initiate, develop, implement, review or revise treatment plan.

## 2021-05-12 NOTE — BH NOTES
Pt appeared to have slept for 4.5 hours thus far during the night shift. Pt is a new admission in the unit.

## 2021-05-12 NOTE — GROUP NOTE
[facility] GROUP DOCUMENTATION INDIVIDUAL Group Therapy Note Date: 5/12/2021 Group Start Time: 3285 Group End Time: 0830 Group Topic: Nursing [address] 1 SPECIAL TRTMT 1 Domingo Segovia RN 
 
[facility] GROUP DOCUMENTATION GROUP Group Therapy Note Attendees: 4 Attendance: Attended Patient's Goal:  Mental Health Relapse Worksheet Interventions/techniques: Informed Follows Directions: Followed directions Interactions: Interacted appropriately Mental Status: Calm Behavior/appearance: Cooperative Goals Achieved: Able to engage in interactions and Able to listen to others Pam Juarez RN

## 2021-05-12 NOTE — H&P
[address]  Inpatient Admission Note    Date of Service:  21    Historian(s): Doug Alcazar and chart review  Referral Source: [facility] ED    Chief Complaint   Suicidal ideation    History of Present Illness     Doug Alcazar is a 31 y.o. WHITE male with a history of multiple psychiatric diagnoses including Schizophrenia, Schizoaffective Disorder, Unspecified Depressive Disorder, SIMD, polysusbtance abuse who was admitted voluntarily from our ED for SI with plan to hang himself. Pt does not appear to be a reliable historian. Collateral was obtained from review of medical records. Pt states he has had SI for the past week and no longer wants to be alive because his mother  last month after taking the J&J COVID vaccine. Later in the interview he said he ran out of medications 2 days ago and was here to get restarted on meds. He has an appointment with the [address] [place] [address] on May 17. He called them prior to coming to the hospital but was told he had to see the doctor before anymore prescriptions could be provided. The pt states he is also hearing multiple voices telling him to hurt and kill himself and making other derogatory comments to him such as he is worthless and would not succeed in life. He also endorses feeling paranoid about people watching him. He endorses VH of \"little boys and girls being killed\". He says he first started having hallucinations at the age of 14. He also endorses a long history of substance use including cocaine, heroin and meth. He says he stopped using recreational drugs 3 months ago however still smoking marijuana once a week and smoking 2 packs of cigarettes daily. Review of medical records indicate he has had multiple admissions to [facility] facilities with last admission at [facility] last month. He has been diagnosed with Malingering, borderline personality traits, borderline intellect, ASD  and substance induced mood disorder.  He has been consistently treated with a combination of Effexor, Abilify and Trazodone. Medical Review of Systems     Constitutional: No fever or chills. All other systems reviewed and are negative except for what is mentioned in HPI>    Psychiatric Treatment History     Self-injurious behavior/risky thoughts or behaviors (past suicidal ideation/attempt):   Pt says he has attempted suicide by jumping off a building, trying to electrocute himself and cutting his wrists. Violence/Risk to others (past homicidal ideation/attempt):   Denies any prior history of violence or homicidal ideation. Previous psychiatric medication trials: Prozac, Zyprexa, Effexor, Abilify    Previous psychiatric hospitalizations: greater than 10 hospitalizations in [facility]. Pt has also been admitted to [facility]. Current psychiatric provider: Dr. Dainela Kothari with [facility]    Allergies    No Known Allergies    Medical History     Past Medical History:   Diagnosis Date    ADHD     Anxiety     Asperger's disorder     Bipolar 1 disorder ([facility]     Depression     Intermittent explosive disorder     Psychiatric disorder     schitzophrenic    Schizophrenia, schizo-affective ([facility] [address]        Medication(s)     Prior to Admission Medications   Prescriptions Last Dose Informant Patient Reported? Taking?   melatonin 3 mg tablet Not Taking at Unknown time  No No   Sig: Take 1 Tab by mouth nightly for 90 days. traZODone (DESYREL) 100 mg tablet Not Taking at Unknown time  No No   Sig: TAKE 1 TABLET BY MOUTH AT BEDTIME      Facility-Administered Medications: None         Family History     Family History   Problem Relation Age of Onset    Diabetes Mother     Sleep Apnea Mother        Psychiatric Family History  Pt denies family history of mental illness    Social History     The pt was born in [place] and raised in [place] by his father. His parents  when he was 1 and his Dad raised him till he was 24.  He moved to [place] at the age of 24 to live with his mom. He says he got into drugs and became homeless for some time. He currently lives in an apartment with a room mate in [place]. He does not have a support system. Vitals/Labs      Vitals:    05/11/21 1556 05/12/21 0013 05/12/21 0104 05/12/21 0755   BP: (!) 144/86 108/78 (!) 131/90 123/70   Pulse: (!) 117 88 88 (!) 106   Resp: 16 16 18 18   Temp: 99.1 °F (37.3 °C) 97.9 °F (36.6 °C) 97.8 °F (36.6 °C) 97.3 °F (36.3 °C)   SpO2: 100% 97%         Labs:   Results for orders placed or performed during the hospital encounter of 05/11/21   COVID-19 RAPID TEST   Result Value Ref Range    Specimen source Nasopharyngeal      COVID-19 rapid test Not detected NOTD     METABOLIC PANEL, COMPREHENSIVE   Result Value Ref Range    Sodium 140 136 - 145 mmol/L    Potassium 3.9 3.5 - 5.5 mmol/L    Chloride 109 100 - 111 mmol/L    CO2 26 21 - 32 mmol/L    Anion gap 5 3.0 - 18 mmol/L    Glucose 122 (H) 74 - 99 mg/dL    BUN 18 7.0 - 18 MG/DL    Creatinine 0.79 0.6 - 1.3 MG/DL    BUN/Creatinine ratio 23 (H) 12 - 20      GFR est AA >60 >60 ml/min/1.73m2    GFR est non-AA >60 >60 ml/min/1.73m2    Calcium 9.1 8.5 - 10.1 MG/DL    Bilirubin, total 0.3 0.2 - 1.0 MG/DL    ALT (SGPT) 59 16 - 61 U/L    AST (SGOT) 27 10 - 38 U/L    Alk. phosphatase 114 45 - 117 U/L    Protein, total 7.2 6.4 - 8.2 g/dL    Albumin 3.9 3.4 - 5.0 g/dL    Globulin 3.3 2.0 - 4.0 g/dL    A-G Ratio 1.2 0.8 - 1.7     CBC WITH AUTOMATED DIFF   Result Value Ref Range    WBC 7.0 4.6 - 13.2 K/uL    RBC 4.88 4.35 - 5.65 M/uL    HGB 15.5 13.0 - 16.0 g/dL    HCT 44.4 36.0 - 48.0 %    MCV 91.0 74.0 - 97.0 FL    MCH 31.8 24.0 - 34.0 PG    MCHC 34.9 31.0 - 37.0 g/dL    RDW 12.7 11.6 - 14.5 %    PLATELET 696 384 - 170 K/uL    MPV 9.8 9.2 - 11.8 FL    NEUTROPHILS 67 40 - 73 %    LYMPHOCYTES 24 21 - 52 %    MONOCYTES 7 3 - 10 %    EOSINOPHILS 2 0 - 5 %    BASOPHILS 1 0 - 2 %    ABS. NEUTROPHILS 4.7 1.8 - 8.0 K/UL    ABS. LYMPHOCYTES 1.7 0.9 - 3.6 K/UL    ABS. MONOCYTES 0.5 0.05 - 1.2 K/UL    ABS. EOSINOPHILS 0.1 0.0 - 0.4 K/UL    ABS. BASOPHILS 0.1 0.0 - 0.1 K/UL    DF AUTOMATED     ETHYL ALCOHOL   Result Value Ref Range    ALCOHOL(ETHYL),SERUM <3 0 - 3 MG/DL   DRUG SCREEN, URINE   Result Value Ref Range    BENZODIAZEPINES Negative NEG      BARBITURATES Negative NEG      THC (TH-CANNABINOL) Positive (A) NEG      OPIATES Negative NEG      PCP(PHENCYCLIDINE) Negative NEG      COCAINE Negative NEG      AMPHETAMINES Negative NEG      METHADONE Negative NEG      HDSCOM (NOTE)        Mental Status Examination     Appearance/Hygiene 31 y.o. WHITE male  Hygiene: unkempt   Behavior/Social Relatedness pleasant   Musculoskeletal Gait/Station: appropriate  Tone (flaccid, cogwheeling, spastic): not assessed  Psychomotor (hyperkinetic, hypokinetic): calm  Involuntary movements (tics, dyskinesias, akathisa, stereotypies): none   Speech   Rate, rhythm, volume, fluency and articulation are appropriate   Mood   depressed   Affect    incongruent   Thought Process Linear and goal directed       Thought Content and Perceptual Disturbances Endorses SI, AH and VH   Sensorium and Cognition  A&Ox4, attention intact, memory intact, language use appropriate, and fund of knowledge age appropriate   Insight  limited   Judgment limited       Suicide Risk Assessment     Admission  Date/Time: 05/12/21    [x] Admission  [] Discharge     Key Factors:   Current admission precipitated by suicide attempt?   []  Yes     2    [x]  No     1     Suicide Attempt History  [] Past attempts of high lethality    2 [x]  Past attempts of low lethality    1 []  No previous attempts       0   Suicidal Ideation []  Constant suicidal thoughts      2 [x]  Intermittent or fleeting suicidal  thoughts  1 []  Denies current suicidal thoughts    0   Suicide Plan   []  Has plan with actual OR potential access to planned method    2 [x]  Has plan without access to planned method      1 []  No plan            0   Plan Lethality [x] Highly lethal plan (Carbon monoxide, gun, hanging, jumping)    2 []  Moderate lethality of plan          1 []  Low lethality of plan (biting, head banging, superficial scratching, pillow over face)  0   Safety Plan Agreement  []  Unwilling OR unable to agree due to impaired reality testing   2   []  Patient is ambivalent and/or guarded      1 [x]  Reliably agrees        0   Current Morbid Thoughts (reunion fantasies, preoccupations with death) []  Constantly     2     []  Frequently    1 [x]  Rarely    0   Elopement Risk  []  High risk     2 []  Moderate risk    1 [x]   Low risk    0   Symptoms    []  Hopeless  [x]  Helpless  []  Anhedonia   []  Guilt/shame  []  Anger/rage  []  Anxiety  []  Insomnia   []  Agitation   []  Impulsivity  []  5-6 symptoms present    2 []  3-4 symptoms present    1  [x]  0-2 symptoms present    0     Total Score: 6  --------------------------------------------------------------------------------------------------------------  Subjective Appraisal of Risk:  []  Patient replies not trustworthy: several non-verbal cues. [x]  Patient replies questionable: trustworthy: at least 1 non-verbal cue.  []  Patient replies appear trustworthy. Protective measures (select all that apply):  []  Successful past responses to stress  []  Spiritual/Nondenominational beliefs  [x]  Capacity for reality testing  [x]  Positive therapeutic relationships  []  Social supports/connections  []  Positive coping skills  []  Frustration tolerance/optimism  []  Children or pets in the home  []  Sense of responsibility to family  [x]  Agrees to treatment plan and follow up    High Risk Diagnoses (select all that apply):  [x]  Depression/Bipolar Disorder  [x]  Dual Diagnosis  []  Cardiovascular Disease  [x]  Schizophrenia  []  Chronic Pain  []  Epilepsy  []  Cancer  []  Personality Disorder  []  HIV/AIDS  []  Multiple Sclerosis    Dangerousness Assessment (Suicide, homicide, property destruction. ..)    Risk Factors reviewed and risk assessed to be:  [] low  [] low-moderate  [] moderate   [x] moderate-high  [] high     Protection factors reviewed and risk assessed to be:  [] low  [x] low-moderate  [] moderate   [] moderate-high  [] high     Response to treatment and risk assessed to be:  [x] low  [] low-moderate  [] moderate   [] moderate-high  [] high     Support reviewed and risk assessed to be:  [] low  [] low-moderate  [x] moderate   [] moderate-high  [] high     Acceptance of Discharge and outpatient treatment reviewed and risk assessed to be:    [x] low  [] low-moderate  [] moderate   [] moderate-high  [] high   Overall risk assessed to be:  [] low  [] low-moderate  [x] moderate   [] moderate-high  [] high       Assessment and Plan     Psychiatric Diagnoses:   Patient Active Problem List   Diagnosis Code    Schizoaffective disorder, depressive type ([facility] [address] F25.1    Cigarette nicotine dependence without complication W06.604    Cannabis use disorder, moderate, dependence ([facility] [address] [facility] F12.20    Cocaine use disorder, moderate, dependence ([facility] [address] F14.20       Level of impairment/disability: Moderate    Bonny Perry is a 31 y.o. who is currently requiring acute stabilization after endorsing SI with plan to hang self. Nevertheless, pt stating he needs to be out of the hospital before May 17 because he has an appointment with the [facility] on May 17 so he is future oriented. He feels safe in the hospital and has no intention of harming himself here. 1. Admit to locked inpatient behavioral health unit. Start milieu, group, art and occupation therapy. 2. Continue prior home medications at lower doses since he has not been taking regularly. Star Effexor XR 75mg daily, Abilify 10mg daily and Trazodone 100mg daily as needed. 3. Routine labs ordered and reviewed by this provider. 4. Reviewed instructions, risks, benefits and side effects.    5. Start disposition planning; verify upcoming outpatient appointments with therapist and/or psychiatric medication prescriber.    6. Tentative date of discharge: 3-5 days       Cathy Gutiérrez MD  [id number]

## 2021-05-13 PROCEDURE — 74011250637 HC RX REV CODE- 250/637: Performed by: PSYCHIATRY & NEUROLOGY

## 2021-05-13 PROCEDURE — 65220000003 HC RM SEMIPRIVATE PSYCH

## 2021-05-13 PROCEDURE — 99232 SBSQ HOSP IP/OBS MODERATE 35: CPT | Performed by: PSYCHIATRY & NEUROLOGY

## 2021-05-13 RX ORDER — VENLAFAXINE HYDROCHLORIDE 75 MG/1
150 CAPSULE, EXTENDED RELEASE ORAL
Status: DISCONTINUED | OUTPATIENT
Start: 2021-05-14 | End: 2021-05-17 | Stop reason: HOSPADM

## 2021-05-13 RX ORDER — ARIPIPRAZOLE 15 MG/1
15 TABLET ORAL DAILY
Status: DISCONTINUED | OUTPATIENT
Start: 2021-05-14 | End: 2021-05-16

## 2021-05-13 RX ADMIN — ARIPIPRAZOLE 10 MG: 10 TABLET ORAL at 08:08

## 2021-05-13 RX ADMIN — ACETAMINOPHEN 650 MG: 325 TABLET ORAL at 15:52

## 2021-05-13 RX ADMIN — TRAZODONE HYDROCHLORIDE 100 MG: 100 TABLET ORAL at 20:13

## 2021-05-13 RX ADMIN — HALOPERIDOL 5 MG: 5 TABLET ORAL at 18:23

## 2021-05-13 RX ADMIN — ACETAMINOPHEN 650 MG: 325 TABLET ORAL at 10:09

## 2021-05-13 RX ADMIN — VENLAFAXINE HYDROCHLORIDE 75 MG: 75 CAPSULE, EXTENDED RELEASE ORAL at 08:08

## 2021-05-13 NOTE — PROGRESS NOTES
Problem: Suicide  Goal: *STG: Remains safe in hospital  Description: AEB remaining safe and free from harm daily while hospitalized. Outcome: Progressing Towards Goal  Goal: *STG: Seeks staff when feelings of self harm or harm towards others arise  Description: AEB seeking staff when feelings of harm towards self/others arise daily while hospitalized. Outcome: Progressing Towards Goal  Goal: *STG/LTG: No longer expresses self destructive or suicidal thoughts  Description: AEB verbalizing that he no longer experiences self destructive or suicidal thoughts prior to discharge from hospital.   Outcome: Progressing Towards Goal       Pt presents anxious and depressed w/ congruent affect. C/o ah and does cfs. Has been cooperative. Wishes to have double portions added to his dietary order.   Will continue to support

## 2021-05-13 NOTE — BSMART NOTE
History: Latha Valles is a 31 y. o. male with ADHD, anxiety, schizophrenia, noncompliant with outpatient psychiatry who presents with suicidal x3 days. [facility] is not homeless. [facility] has not used marijuana recently drinks infrequently and still smokes tobacco. [facility] is compliant with his medications were never he can get them but he does not follow with an outpatient psychiatrist. [facility] gets his prescriptions from the hospitals. SW made contact with patient as he engaged with peers in the milieu. Patient is encouraged to continue addressing treatment needs. Patient continues to endorse AVH. Denies SI. Patient is encouraged to remain medication compliant. SW will continue to monitor patient and will assist as needed.  
 
Gennett Schilder, LCSW-E

## 2021-05-13 NOTE — BH NOTES
The patient was monitored for safety. Affect was social but did say during the day that he continued to hear voices. Pt was encouraged to talk with his Dr and to continue to verbalize all issues. Pt was able to see his SW also. Pt participated in all groups and activities. Pt was redirected during the shift for talking inappropriately to his peers. Pt does show immature behaviors. Pt does respond well to processing out his issues. Staff will continue to monitor for safety and locations.

## 2021-05-13 NOTE — PROGRESS NOTES
[address]  Inpatient Progress Note     Date of Service: 05/13/21  Hospital Day: 2     Subjective/Interval History   05/13/21    Treatment Team Notes:  Notes reviewed and/or discussed and report that Neil Knight is a 24-year-old male admitted for suicidal ideation. No acute events overnight. No behavioral problems. Patient slept 6.5 hours last night according to the MHT note. Patient interview: Neil Knight was interviewed by this writer today. Patient continues to endorse depressed mood but says he is slightly better compared to yesterday. He says he is still grieving the loss of his mother. He complains of not sleeping well last night and says he needs his dose of trazodone increased although MHT documented that he slept 6.5 hours. He says the auditory hallucinations are \"very loud today\". He denies suicidal ideations. He has been out in the milieu attending groups and interacting with peers. Objective     Visit Vitals  /84 (BP 1 Location: Left upper arm, BP Patient Position: At rest)   Pulse 92   Temp 96.9 °F (36.1 °C)   Resp 16   SpO2 97%       No results found for this or any previous visit (from the past 24 hour(s)). Mental Status Examination     Appearance/Hygiene 31 y.o.  WHITE male  Hygiene: fair   Behavior/Social Relatedness Appropriate   Musculoskeletal Gait/Station: appropriate  Tone (flaccid, cogwheeling, spastic): not assessed  Psychomotor (hyperkinetic, hypokinetic): calm   Involuntary movements (tics, dyskinesias, akathisa, stereotypies): none   Speech   Rate, rhythm, volume, fluency and articulation are appropriate   Mood   depressed   Affect    Wide range of affect   Thought Process Linear and goal directed   Thought Content and Perceptual Disturbances Denies self-injurious behavior (SIB), suicidal ideation (SI), aggressive behavior or homicidal ideation (HI)    Endorses auditory hallucinations   Sensorium and Cognition  Grossly intact Insight  limited   Judgment limited        Assessment/Plan      Psychiatric Diagnoses:   Patient Active Problem List   Diagnosis Code    Schizoaffective disorder, depressive type ([facility] [address] F25.1    Cigarette nicotine dependence without complication F02.059    Cannabis use disorder, moderate, dependence ([facility] [address] [facility] F12.20    Cocaine use disorder, moderate, dependence ([facility] [address] F14.20       Level of impairment/disability: Guillaume Foley is a 31 y.o. who is currently admitted for suicidal ideation, doing slightly better. 1.  Increase Abilify to 15 mg daily for auditory hallucinations. Patient prescribed 30 mg at home. Increase Effexor XR to 150 mg daily for mood. 2.  Reviewed instructions, risks, benefits and side effects of medications  3. Disposition/Discharge Date: self-care/home, to be determined.     Delilah Appiah MD  [facility]  Psychiatry

## 2021-05-13 NOTE — BSMART NOTE
OCCUPATIONAL THERAPY PROGRESS NOTE Group Time:  4687 Attendance: The patient attended full group. Participation: The patient participated fully in the activity. Attention: The patient needed redirection to activity at least once. Interaction: The patient occasionally  interacts with others. Participated actively, concrete with some cognitive limitations. Mood a little brighter than when seen briefly yesterday.

## 2021-05-13 NOTE — GROUP NOTE
[facility]  GROUP DOCUMENTATION INDIVIDUAL Group Therapy Note Date: 5/12/2021 Group Start Time: 2000 Group End Time: 2030 Group Topic: Nursing [facility] 1 [address] Monserrat Slaughter RN 
 
[facility] GROUP DOCUMENTATION GROUP Group Therapy Note Attendees:  10 Attendance: Attended Patient's Goal:  Medication compliance Interventions/techniques: Informed Follows Directions: Followed directions Interactions: Interacted appropriately Mental Status: Calm Behavior/appearance: Cooperative Goals Achieved: Able to engage in interactions Gurwinder Chun RN

## 2021-05-13 NOTE — BH NOTES
Pt c/o .  Pt requesting prn medication. Pt given prn medication 5 mg haldol po for psychosis.   Will continue to monitor

## 2021-05-13 NOTE — GROUP NOTE
[facility]  GROUP DOCUMENTATION INDIVIDUAL Group Therapy Note Date: 5/13/2021 Group Start Time: 9015 Group End Time: 1000 Group Topic: Nursing [facility] 1 [address] Albina Pelaez RN 
 
[facility] GROUP DOCUMENTATION GROUP Group Therapy Note Attendees: 9 Attendance: Attended Patient's Goal:  Discharge Planning/ treatment team 
 
Interventions/techniques: Informed Follows Directions: Followed directions Interactions: Interacted appropriately Mental Status: Calm Behavior/appearance: Cooperative Goals Achieved: Able to listen to others Additional Notes:   Discussion on discharge planning. Roles of the of the  treatment team. Staying compliant with follow up . Jessica Haywood RN

## 2021-05-13 NOTE — BH NOTES
Patient was calm, cooperative, and pleasant during shift. Patient attended groups and interacted appropriately with staff and peers. Will continue to monitor.

## 2021-05-14 PROCEDURE — 74011250637 HC RX REV CODE- 250/637: Performed by: PSYCHIATRY & NEUROLOGY

## 2021-05-14 PROCEDURE — 99231 SBSQ HOSP IP/OBS SF/LOW 25: CPT | Performed by: PSYCHIATRY & NEUROLOGY

## 2021-05-14 PROCEDURE — 65220000003 HC RM SEMIPRIVATE PSYCH

## 2021-05-14 RX ADMIN — ACETAMINOPHEN 650 MG: 325 TABLET ORAL at 19:15

## 2021-05-14 RX ADMIN — ARIPIPRAZOLE 15 MG: 15 TABLET ORAL at 08:07

## 2021-05-14 RX ADMIN — VENLAFAXINE HYDROCHLORIDE 150 MG: 75 CAPSULE, EXTENDED RELEASE ORAL at 08:07

## 2021-05-14 RX ADMIN — TRAZODONE HYDROCHLORIDE 100 MG: 100 TABLET ORAL at 20:06

## 2021-05-14 NOTE — BSMART NOTE
SOCIAL WORK GROUP THERAPY PROGRESS NOTE Group Time:  10am 
 
Group Topic:  Coping Skills Group Participation: Pt was unavailable for group due to \"resting\", \"will come later\", never did.

## 2021-05-14 NOTE — PROGRESS NOTES
Problem: Suicide  Goal: *STG: Remains safe in hospital  Description: AEB remaining safe and free from harm daily while hospitalized. Outcome: Progressing Towards Goal  Goal: *STG: Seeks staff when feelings of self harm or harm towards others arise  Description: AEB seeking staff when feelings of harm towards self/others arise daily while hospitalized. Outcome: Progressing Towards Goal  Goal: *STG/LTG: No longer expresses self destructive or suicidal thoughts  Description: AEB verbalizing that he no longer experiences self destructive or suicidal thoughts prior to discharge from hospital.   Outcome: Progressing Towards Goal       The patient has been up at will. He is alert and orientated to time, place and situation. He is interacting very well with staff and peers. He denies that there are thoughts of harming others or himself. He has been compliant on the unit. Continuing to monitor; will continue to provide support and kwill report all changes.

## 2021-05-14 NOTE — BSMART NOTE
Nancy Valles is a 31 y. o. male with ADHD, anxiety, schizophrenia, noncompliant with outpatient psychiatry who presents with suicidal x3 days. [facility] is not homeless. [facility] has not used marijuana recently drinks infrequently and still smokes tobacco. [facility] is compliant with his medications were never he can get them but he does not follow with an outpatient psychiatrist. [facility] prescriptions from the hospitals. Patient reports no major issues or concerns. Patient reports he is preparing for discharge on Monday. Patient denies SI/HI. Patient denies AVH. SW encouraged group therapy to address loss and grief. SW will continue supportive counseling and will assist patient as needed.  
 
JESSENIA MuellerE

## 2021-05-14 NOTE — GROUP NOTE
[facility] GROUP DOCUMENTATION INDIVIDUAL Group Therapy Note Date: 5/14/2021 Group Start Time: 1052 Group End Time: 3544 Group Topic: Nursing [facility] 1 SPECIAL TRTMT 1 Liborio Mathew RN 
 
[facility] GROUP DOCUMENTATION GROUP Group Therapy Note Attendees: 11 Attendance: Attended Patient's Goal:  Worksheet on \" My Anxiety\" and Discussion Interventions/techniques: Informed Follows Directions: Followed directions Interactions: Interacted appropriately Mental Status: Calm Behavior/appearance: Cooperative Goals Achieved: Able to listen to others Additional Notes:   
Discussion on Things that make you feel anxious, the way your body reacts, and coping skills used to help things we don't have control over.   
 
Adis Woods RN

## 2021-05-14 NOTE — BSMART NOTE
OCCUPATIONAL THERAPY PROGRESS NOTE Group Time:  9083 Attendance: The patient attended full group. Participation: The patient participated fully in the activity. Attention: The patient was able to focus on the activity. Interaction: The patient frequently interacts with others. Mood very bright, energetic. Stated he felt much better. [place], responses appropriate.

## 2021-05-14 NOTE — GROUP NOTE
[facility]  GROUP DOCUMENTATION INDIVIDUAL Group Therapy Note Date: 5/13/2021 Group Start Time: 2000 Group End Time: 2030 Group Topic: Medication [facility] 1 SPECIAL TRTMT 1 Ismael Ates [facility]  GROUP DOCUMENTATION GROUP Group Therapy Note Attendees:11 Attendance: Attended Interventions/techniques: Informed Follows Directions: Followed directions Interactions: Interacted appropriately Mental Status: Congruent Behavior/appearance: Attentive Goals Achieved: Able to listen to others Florinda Nunez

## 2021-05-14 NOTE — PROGRESS NOTES
[address] [facility] [address]  Inpatient Progress Note     Date of Service: 05/14/21  Hospital Day: 3     Subjective/Interval History   05/14/21    Treatment Team Notes:  Notes reviewed and/or discussed and report that Bonny Perry is a 79-year-old male admitted for suicidal ideation. No acute events overnight. No behavioral problems. Patient slept 7 hours last night according to the MHT note. He received Haldol 5 mg yesterday evening for auditory hallucinations. Patient interview: Bonny Perry was interviewed by this writer today. Patient continues to endorse depressed mood but says he is slightly better compared to yesterday. Staff has reported that he is very sociable on the milieu and seems to have very bright affect and euthymic mood. However, patient states he is still struggling to fight his depression and thinks that talking and socializing with others will help. He was encouraged to keep going to groups and expressing his feelings. He denies suicidal ideations today. He says he feels he will be ready for discharge by Monday and he has a follow-up appointment with the [address] [place] [address] on Monday, May 17. He is tolerating current medication regimen well. Objective     Visit Vitals  BP (!) 144/83   Pulse 97   Temp 97.1 °F (36.2 °C)   Resp 16   SpO2 97%       No results found for this or any previous visit (from the past 24 hour(s)). Mental Status Examination     Appearance/Hygiene 31 y.o.  WHITE male  Hygiene: fair   Behavior/Social Relatedness Appropriate   Musculoskeletal Gait/Station: appropriate  Tone (flaccid, cogwheeling, spastic): not assessed  Psychomotor (hyperkinetic, hypokinetic): calm   Involuntary movements (tics, dyskinesias, akathisa, stereotypies): none   Speech   Rate, rhythm, volume, fluency and articulation are appropriate   Mood   depressed   Affect    incongruent   Thought Process Linear and goal directed   Thought Content and Perceptual Disturbances Denies self-injurious behavior (SIB), suicidal ideation (SI), aggressive behavior or homicidal ideation (HI)    Endorses auditory hallucinations   Sensorium and Cognition  Grossly intact   Insight  limited   Judgment limited        Assessment/Plan      Psychiatric Diagnoses:   Patient Active Problem List   Diagnosis Code    Schizoaffective disorder, depressive type ([facility] [address] F25.1    Cigarette nicotine dependence without complication W71.171    Cannabis use disorder, moderate, dependence ([facility] [address] F12.20    Cocaine use disorder, moderate, dependence ([facility] [address] F14.20       Level of impairment/disability: Moderate    Gokul Cummings is a 31 y.o. who is currently admitted for suicidal ideation, doing slightly better. 1. Continue current medication regimen regimen without changes. 2.  Reviewed instructions, risks, benefits and side effects of medications  3. Disposition/Discharge Date: Discharge planning for Monday.     Zandra Sanabria MD  [facility]  Psychiatry

## 2021-05-14 NOTE — BSMART NOTE
ART THERAPY GROUP PROGRESS NOTE PATIENT SCHEDULED FOR GROUP AT: 8749 ATTENDANCE: Full PARTICIPATION LEVEL: Participates fully in the art process ATTENTION LEVEL : Able to focus on task FOCUS: Goals SYMBOLIC & THEMATIC CONTENT AS NOTED IN IMAGERY: He was cheerful and presented with a bright affect. He was invested in the task at hand and actively participated in group discussion.  His thought process was concrete and associations simplistic and general.

## 2021-05-14 NOTE — BH NOTES
The patient was monitored for safety. Pt affect was social, but needed redirection for his boundaries with his peers. Pt was easily redirected. Pt confronted the staff and verbalized that he continues to hear voices. The staff processed with pt and he was able to cope with his issues. Pt was encouraged to continue to verbalize all issues to his DR. Pt did talk with his DR and SW. Pt was able to attend all of his groups and activities. Pt did eat at all meals. Staff will continue to monitor for safety and locations.

## 2021-05-15 PROCEDURE — 99231 SBSQ HOSP IP/OBS SF/LOW 25: CPT | Performed by: PSYCHIATRY & NEUROLOGY

## 2021-05-15 PROCEDURE — 74011250637 HC RX REV CODE- 250/637: Performed by: PSYCHIATRY & NEUROLOGY

## 2021-05-15 PROCEDURE — 65220000003 HC RM SEMIPRIVATE PSYCH

## 2021-05-15 RX ADMIN — HALOPERIDOL 5 MG: 5 TABLET ORAL at 08:05

## 2021-05-15 RX ADMIN — ACETAMINOPHEN 650 MG: 325 TABLET ORAL at 09:37

## 2021-05-15 RX ADMIN — TRAZODONE HYDROCHLORIDE 100 MG: 100 TABLET ORAL at 20:10

## 2021-05-15 RX ADMIN — ARIPIPRAZOLE 15 MG: 15 TABLET ORAL at 09:00

## 2021-05-15 RX ADMIN — VENLAFAXINE HYDROCHLORIDE 150 MG: 75 CAPSULE, EXTENDED RELEASE ORAL at 08:00

## 2021-05-15 RX ADMIN — BENZTROPINE MESYLATE 1 MG: 1 TABLET ORAL at 08:05

## 2021-05-15 NOTE — PROGRESS NOTES
[address] [facility] [address]  Inpatient Progress Note     Date of Service: 05/15/21  Hospital Day: 4     Subjective/Interval History   05/15/21    Treatment Team Notes:  Notes reviewed and/or discussed and report that Santino Hamilton is a 53-year-old male admitted for suicidal ideation. No acute events overnight. No behavioral problems. Patient slept 7 hours last night according to the MHT note. He received Haldol 5 mg this morning for auditory hallucinations for auditory hallucinations. Patient interview: Santino Hamilton was interviewed by this writer today. The patient reports that he is feeling much better today in terms of his mood. Also reports improvement in auditory hallucinations and says the voices are no longer telling him to harm himself. He is sleeping well, has good energy and appetite. He is tolerating current medication regimen well. Supportive therapy provided. Patient encouraged to expand his social network and support system. He says his support system consists of his  from [employer] and a few friends. He plans to start individual outpatient psychotherapy and hopes his psychiatrist will refer him for that. He has appointment on Monday. Objective     Visit Vitals  /82   Pulse 85   Temp 98 °F (36.7 °C)   Resp 18   SpO2 97%       No results found for this or any previous visit (from the past 24 hour(s)). Mental Status Examination     Appearance/Hygiene 31 y.o.  WHITE male  Hygiene: fair   Behavior/Social Relatedness Appropriate   Musculoskeletal Gait/Station: appropriate  Tone (flaccid, cogwheeling, spastic): not assessed  Psychomotor (hyperkinetic, hypokinetic): calm   Involuntary movements (tics, dyskinesias, akathisa, stereotypies): none   Speech   Rate, rhythm, volume, fluency and articulation are appropriate   Mood   euthymic   Affect    congruent   Thought Process Linear and goal directed   Thought Content and Perceptual Disturbances Denies self-injurious behavior (SIB), suicidal ideation (SI), aggressive behavior or homicidal ideation (HI)    Endorses auditory hallucinations   Sensorium and Cognition  Grossly intact   Insight  limited   Judgment limited        Assessment/Plan      Psychiatric Diagnoses:   Patient Active Problem List   Diagnosis Code    Schizoaffective disorder, depressive type ([facility] [address] F25.1    Cigarette nicotine dependence without complication T83.258    Cannabis use disorder, moderate, dependence ([facility] [address] F12.20    Cocaine use disorder, moderate, dependence ([facility] [address] F14.20       Level of impairment/disability: Guillaume Shields is a 31 y.o. who is currently admitted for suicidal ideation, doing slightly better. 1. Continue current medication regimen regimen without changes. 2.  Reviewed instructions, risks, benefits and side effects of medications  3. Disposition/Discharge Date: Discharge planning for Monday.     Rachell Velazco MD DR. [facility]  Psychiatry

## 2021-05-15 NOTE — PROGRESS NOTES
Problem: Suicide  Goal: *STG: Remains safe in hospital  Description: AEB remaining safe and free from harm daily while hospitalized. Outcome: Progressing Towards Goal  Goal: *STG: Seeks staff when feelings of self harm or harm towards others arise  Description: AEB seeking staff when feelings of harm towards self/others arise daily while hospitalized. Outcome: Progressing Towards Goal  Goal: *STG:  Verbalizes alternative ways of dealing with maladaptive feelings/behaviors  Description: AEB verbalizing at least 3 alternative ways of dealing with maladaptive feelings/behaviors daily while hospitalized. Outcome: Progressing Towards Goal  Goal: *STG/LTG: No longer expresses self destructive or suicidal thoughts  Description: AEB verbalizing that he no longer experiences self destructive or suicidal thoughts prior to discharge from hospital.   Outcome: Progressing Towards Goal     Sun Belle is a 31 y.o. Male that presented today as depressed, anxious, and responding to internal stimuli, but cooperative with staff. Sun Belle has been compliant with medications, has eaten all meals offered, and has attended some groups. RN's will initiate, develop, implement, review, and revise treatment plans as necessary. Will continue to provide education, redirection, and support as needed or verbalized by patient.    Signed By: Talib Rutherford RN     May 15, 2021

## 2021-05-15 NOTE — BH NOTES
MHT Note   Patient was calm and compliant throughout the shift. Patient was cooperative with all of staffs instructions. Patient reported that he was feeling better today than he did when he was first admitted. Patient consumed all of his meals during the shift. No falls or major issues to report at this time.

## 2021-05-15 NOTE — BH NOTES
Group Therapy Note    Patient: Darius Garcia    Patient # in Group: 10    Group Type: Leisure-Creative Group    Group Time: 30 minutes    Method used: Free discussion    Attendance: Darius Garcia was      compliant with group and participated fully for 100% of the duration. Interaction Narrative: Darius Garcia had a Depressed and Anxious mood, was Cooperative during group and [employer] thought content was Paranoid. Writer Reinforced patient's understanding of the importance of incorporating leisure activities to help reduce the stressors in life that often add to worsening symptoms of one's diagnosis. Patient was Able to listen to others, Able to reflect/comment on own behavior and Able to experience relief/decrease in symptoms. Will continue to monitor and provide redirection, education, and support as needed.

## 2021-05-16 PROCEDURE — 65220000003 HC RM SEMIPRIVATE PSYCH

## 2021-05-16 PROCEDURE — 74011250637 HC RX REV CODE- 250/637: Performed by: PSYCHIATRY & NEUROLOGY

## 2021-05-16 PROCEDURE — 99231 SBSQ HOSP IP/OBS SF/LOW 25: CPT | Performed by: PSYCHIATRY & NEUROLOGY

## 2021-05-16 RX ORDER — ARIPIPRAZOLE 15 MG/1
30 TABLET ORAL DAILY
Status: DISCONTINUED | OUTPATIENT
Start: 2021-05-17 | End: 2021-05-17 | Stop reason: HOSPADM

## 2021-05-16 RX ADMIN — HALOPERIDOL 5 MG: 5 TABLET ORAL at 02:08

## 2021-05-16 RX ADMIN — TRAZODONE HYDROCHLORIDE 100 MG: 100 TABLET ORAL at 20:17

## 2021-05-16 RX ADMIN — ARIPIPRAZOLE 15 MG: 15 TABLET ORAL at 08:06

## 2021-05-16 RX ADMIN — VENLAFAXINE HYDROCHLORIDE 150 MG: 75 CAPSULE, EXTENDED RELEASE ORAL at 08:06

## 2021-05-16 NOTE — BH NOTES
Patient stated he was hearing voices, patient given Haldol for psychosis will continue to follow current POC and interventions per policies/protocols.

## 2021-05-16 NOTE — BH NOTES
Group Therapy Note    Patient: Jairo Bloom    Patient # in Group: 10    Group Type: Leisure-Creative Group    Group Time: 30 minutes    Method used: Free discussion    Attendance: Jairo Bloom was      compliant with group and participated fully for 100% of the duration. Interaction Narrative: Jairo Bloom had a Depressed mood, was Cooperative during group and [employer] thought content was Goal Directed. Writer Reinforced patient's understanding of how to use recreational therapies to enhance one's own ability to effectively process significant stressors that stem from life and issues related to diagnosis symptoms. Patient was Able to engage in interactions, Able to reflect/comment on own behavior and Able to experience relief/decrease in symptoms. Will continue to monitor and provide redirection, education, and support as needed.

## 2021-05-16 NOTE — PROGRESS NOTES
Problem: Suicide  Goal: *STG: Remains safe in hospital  Description: AEB remaining safe and free from harm daily while hospitalized. Outcome: Progressing Towards Goal  Goal: *STG: Seeks staff when feelings of self harm or harm towards others arise  Description: AEB seeking staff when feelings of harm towards self/others arise daily while hospitalized. Outcome: Progressing Towards Goal  Goal: *STG:  Verbalizes alternative ways of dealing with maladaptive feelings/behaviors  Description: AEB verbalizing at least 3 alternative ways of dealing with maladaptive feelings/behaviors daily while hospitalized. Outcome: Progressing Towards Goal  Goal: *STG/LTG: No longer expresses self destructive or suicidal thoughts  Description: AEB verbalizing that he no longer experiences self destructive or suicidal thoughts prior to discharge from hospital.   Outcome: Progressing Towards Goal     Karen Zelaya is a 31 y.o. Male that presented today as anxious, depressed, but overall amenable towards staff. Karen Zelaya has been compliant with medications, has eaten all meals offered, and has attended groups offered. RN's will initiate, develop, implement, review, and revise treatment plans as necessary. Will continue to provide education, redirection, and support as needed or verbalized by patient.    Signed By: Mari Quinones RN     May 16, 2021

## 2021-05-16 NOTE — BH NOTES
The patient was monitored for safety. Pt affect was social, positive and compliant. Pt did eat at all meals and snacks. Pt did talk with his DR. Pt did participate in all groups and activities. Staff will continue to monitor for safety and locations.

## 2021-05-16 NOTE — PROGRESS NOTES
[address] [facility] [address]  Inpatient Progress Note     Date of Service: 05/16/21  Hospital Day: 5     Subjective/Interval History   05/16/21    Treatment Team Notes:  Notes reviewed and/or discussed and report that Gokul Cummings is a 62-year-old male admitted for suicidal ideation. No acute events overnight. No behavioral problems. Patient slept 7 hours last night according to the MHT note. He received Haldol 5 mg around 2 AM for auditory hallucinations for auditory hallucinations. Patient interview: Gokul Cummings was interviewed by this writer today. The patient reports that he is feeling much better today in terms of his mood. Also reports improvement in auditory hallucinations and says the voices are no longer telling him to harm himself. He is sleeping well, has good energy and appetite. He is tolerating current medication regimen well. He wants to be discharged tomorrow so he can go to his outpatient psychiatrist appointment. We discussed increasing his dose of Abilify to 30 mg which is what he has been prescribed in the recent past given that he continues to have auditory hallucinations. Objective     Visit Vitals  /68   Pulse 94   Temp 97 °F (36.1 °C)   Resp 18   SpO2 97%       No results found for this or any previous visit (from the past 24 hour(s)). Mental Status Examination     Appearance/Hygiene 31 y.o.  WHITE male  Hygiene: fair   Behavior/Social Relatedness Appropriate   Musculoskeletal Gait/Station: appropriate  Tone (flaccid, cogwheeling, spastic): not assessed  Psychomotor (hyperkinetic, hypokinetic): calm   Involuntary movements (tics, dyskinesias, akathisa, stereotypies): none   Speech   Rate, rhythm, volume, fluency and articulation are appropriate   Mood   euthymic   Affect    congruent   Thought Process Linear and goal directed   Thought Content and Perceptual Disturbances Denies self-injurious behavior (SIB), suicidal ideation (SI), aggressive behavior or homicidal ideation (HI)    Endorses auditory hallucinations   Sensorium and Cognition  Grossly intact   Insight  limited   Judgment limited        Assessment/Plan      Psychiatric Diagnoses:   Patient Active Problem List   Diagnosis Code    Schizoaffective disorder, depressive type ([facility] [address] F25.1    Cigarette nicotine dependence without complication L82.400    Cannabis use disorder, moderate, dependence ([facility] [address] F12.20    Cocaine use disorder, moderate, dependence ([facility] [address] F14.20       Level of impairment/disability: Guillaume Perry is a 31 y.o. who is currently admitted for suicidal ideation, doing slightly better. 1.  Increase Abilify to 30 mg daily. 2.  Reviewed instructions, risks, benefits and side effects of medications  3. Disposition/Discharge Date: Discharge planning for Monday.     Ji López MD DR. [facility]  Psychiatry

## 2021-05-17 VITALS
HEART RATE: 78 BPM | TEMPERATURE: 97 F | OXYGEN SATURATION: 97 % | SYSTOLIC BLOOD PRESSURE: 141 MMHG | RESPIRATION RATE: 14 BRPM | DIASTOLIC BLOOD PRESSURE: 70 MMHG

## 2021-05-17 PROCEDURE — 74011250637 HC RX REV CODE- 250/637: Performed by: PSYCHIATRY & NEUROLOGY

## 2021-05-17 PROCEDURE — 99239 HOSP IP/OBS DSCHRG MGMT >30: CPT | Performed by: PSYCHIATRY & NEUROLOGY

## 2021-05-17 RX ORDER — TRAZODONE HYDROCHLORIDE 100 MG/1
TABLET ORAL
Qty: 30 TAB | Refills: 0 | Status: SHIPPED | OUTPATIENT
Start: 2021-05-17 | End: 2022-01-31

## 2021-05-17 RX ORDER — VENLAFAXINE HYDROCHLORIDE 150 MG/1
150 CAPSULE, EXTENDED RELEASE ORAL
Qty: 30 CAP | Refills: 0 | Status: SHIPPED | OUTPATIENT
Start: 2021-05-18 | End: 2022-01-31

## 2021-05-17 RX ORDER — ARIPIPRAZOLE 30 MG/1
30 TABLET ORAL DAILY
Qty: 30 TAB | Refills: 0 | Status: SHIPPED | OUTPATIENT
Start: 2021-05-18 | End: 2022-01-31

## 2021-05-17 RX ADMIN — ACETAMINOPHEN 650 MG: 325 TABLET ORAL at 09:02

## 2021-05-17 RX ADMIN — ARIPIPRAZOLE 30 MG: 15 TABLET ORAL at 08:14

## 2021-05-17 RX ADMIN — VENLAFAXINE HYDROCHLORIDE 150 MG: 75 CAPSULE, EXTENDED RELEASE ORAL at 08:14

## 2021-05-17 NOTE — DISCHARGE INSTRUCTIONS
BEHAVIORAL HEALTH NURSING DISCHARGE NOTE      The following personal items collected during your admission are returned to you:   Dental Appliance: Dental Appliances: None  Vision: Visual Aid: None  Hearing Aid:    Jewelry: Jewelry: None  Clothing: Clothing: Footwear, Pants, Shirt  Other Valuables: Other Valuables: Cell Phone, Lighter/matches  Valuables sent to safe: Personal Items Sent to Safe: 3 lighters      PATIENT INSTRUCTIONS:           The discharge information has been reviewed with the patient. The patient verbalized understanding.         Patient armband removed and shredded

## 2021-05-17 NOTE — GROUP NOTE
CORTNEY  GROUP DOCUMENTATION INDIVIDUAL Group Therapy Note Date: 5/17/2021 Group Start Time: 0830 Group End Time: 6390 Group Topic: Medication [facility] [place] [id number] [address] MYRON Cuevas  GROUP DOCUMENTATION GROUP Group Therapy Note Attendees: 11 Attendance: Attended Patient's Goal:  Medication compliance Interventions/techniques: Informed Follows Directions: Followed directions Interactions: Interacted appropriately Mental Status: Anxious Behavior/appearance: Cooperative Goals Achieved: Able to self-disclose Edwardo Almeida RN 
 

## 2021-05-17 NOTE — SUICIDE SAFETY PLAN
SAFETY PLAN    A suicide Safety Plan is a document that supports someone when they are having thoughts of suicide. Warning Signs that indicate a suicidal crisis may be developing: What (situations, thoughts, feelings, body sensations, behaviors, etc.) do you experience that lets you know you are beginning to think about suicide? 1.when I start to shake  2. Hearing voices  3. depression    Internal Coping Strategies:  What things can I do (relaxation techniques, hobbies, physical activities, etc.) to take my mind off my problems without contacting another person? 1. Video games   2.listen to [place] and social settings that provide distraction: Who can I call or where can I go to distract me? 1. Name: friends   2. Name:   3. Place: walks             People whom I can ask for help: Who can I call when I need help - for example, friends, family, clergy, someone else? 1. Name: Friends              2. Name: Silverio Doyle or [address] [place] [address] I can contact during a crisis: Who can I call for help - for example, my doctor, my psychiatrist, my psychologist, a mental health provider, a suicide hotline?     1.  Suicide Prevention Lifeline: 1-963-531-TALK (9164)

## 2021-05-18 ENCOUNTER — TELEPHONE (OUTPATIENT)
Dept: ADDICTION MEDICINE | Age: 26
End: 2021-05-18

## 2021-05-18 NOTE — TELEPHONE ENCOUNTER
This writer attempted to complete follow-up call at [phone] to phone number [phone]. Phone number not in service. No alternate phone number listed.

## 2021-05-19 NOTE — DISCHARGE SUMMARY
[facility]  Inpatient Psychiatry   Discharge Summary     Admit date: 2021    Discharge date and time: 2021 11:05 AM    Discharge Physician: Concetta Salgado MD    DISCHARGE DIAGNOSES     Psychiatric Diagnoses:   Patient Active Problem List   Diagnosis Code    Schizoaffective disorder, depressive type ([facility] F25.1    Cigarette nicotine dependence without complication S19.497    Cannabis use disorder, moderate, dependence ([facility] F12.20    Cocaine use disorder, moderate, dependence ([facility] F14.20       Level of impairment/disability:  [address]    Zafar Bolanos is a 31 y.o. WHITE male with a history of multiple psychiatric diagnoses including Schizophrenia, Schizoaffective Disorder, Unspecified Depressive Disorder, SIMD, polysusbtance abuse who was admitted voluntarily from our ED for SI with plan to hang himself. Pt does not appear to be a reliable historian. Collateral was obtained from review of medical records.     Pt states he has had SI for the past week and no longer wants to be alive because his mother  last month after taking the [employer] COVID vaccine. Later in the interview he said he ran out of medications 2 days ago and was here to get restarted on meds. He has an appointment with the [address] [place] [address] on May 17. He called them prior to coming to the hospital but was told he had to see the doctor before anymore prescriptions could be provided. The pt states he is also hearing multiple voices telling him to hurt and kill himself and making other derogatory comments to him such as he is worthless and would not succeed in life. He also endorses feeling paranoid about people watching him. He endorses VH of \"little boys and girls being killed\". He says he first started having hallucinations at the age of 14. He also endorses a long history of substance use including cocaine, heroin and meth.  He says he stopped using recreational drugs 3 months ago however still smoking marijuana once a week and smoking 2 packs of cigarettes daily.     Review of medical records indicate he has had multiple admissions to [facility] facilities with last admission at [facility] last month. He has been diagnosed with Malingering, borderline personality traits, borderline intellect, ASD  and substance induced mood disorder. He has been consistently treated with a combination of Effexor, Abilify and Trazodone. Hospital course: The patient was admitted and monitored for suicidal ideation. He received individual, group and medication therapy. He was continued on home medications of Effexor, Abilify and trazodone without any changes made in the doses. He complained of auditory hallucinations although did not really appear to be responding to internal stimuli. He also complained of feeling very depressed but his affect was quite bright. He socialized appropriately with staff and peers. He interacted appropriately. He attended groups with good participation. He was not a behavioral problem and did not engage in any self-injurious behavior. He reported improvement in his mood and denied suicidal ideations for a number of days prior to discharge. His plan was to continue outpatient treatment with the [facility]. No SI, HI, psychosis, madi at time of discharge. Patient is deemed to be at a low acute risk of suicide at this time as he reports euthymic mood, his affect is bright, he is motivated for treatment in the outpatient setting. He denies access to guns. DISPOSITION/FOLLOW-UP     Disposition: Home    Follow-up Appointments:    Follow-up Information     Follow up With Specialties Details Why [address] [facility], [address] [facility], NP Nurse Practitioner   [address] [facility]  [address] [place] [address]  [phone]          Patient has an appointment with Dr. Trell Farah on 5/17/21 @ 12:30pm  [facility] AT [facility]. [address], [address] [facility]  [phone] # 3  [phone]:HTR MEDICATION CHANGES   Outpatient medications:  No current facility-administered medications on file prior to encounter. No current outpatient medications on file prior to encounter. Discharged medication:  Discharge Medication List as of 5/17/2021  2:42 PM      START taking these medications    Details   ARIPiprazole (ABILIFY) 30 mg tablet Take 1 Tab by mouth daily. Indications: schizophrenia, Print, Disp-30 Tab, R-0      venlafaxine-SR (EFFEXOR-XR) 150 mg capsule Take 1 Cap by mouth daily (with breakfast). Indications: major depressive disorder, Print, Disp-30 Cap, R-0         CONTINUE these medications which have CHANGED    Details   traZODone (DESYREL) 100 mg tablet TAKE 1 TABLET BY MOUTH AT BEDTIME  Indications: insomnia associated with depression, Print, Disp-30 Tab, R-0         STOP taking these medications       melatonin 3 mg tablet Comments:   Reason for Stopping:               Instructions, risks (black box warning), benefits and side effects (EPS, TD, NMS) were discussed in detail prior to discharge. Patient denied any adverse medication side effects prior to discharge. LABS/IMAGING DURING ADMISSION     Results for orders placed or performed during the hospital encounter of 05/11/21   COVID-19 RAPID TEST   Result Value Ref Range    Specimen source Nasopharyngeal      COVID-19 rapid test Not detected NOTD     METABOLIC PANEL, COMPREHENSIVE   Result Value Ref Range    Sodium 140 136 - 145 mmol/L    Potassium 3.9 3.5 - 5.5 mmol/L    Chloride 109 100 - 111 mmol/L    CO2 26 21 - 32 mmol/L    Anion gap 5 3.0 - 18 mmol/L    Glucose 122 (H) 74 - 99 mg/dL    BUN 18 7.0 - 18 MG/DL    Creatinine 0.79 0.6 - 1.3 MG/DL    BUN/Creatinine ratio 23 (H) 12 - 20      GFR est AA >60 >60 ml/min/1.73m2    GFR est non-AA >60 >60 ml/min/1.73m2    Calcium 9.1 8.5 - 10.1 MG/DL    Bilirubin, total 0.3 0.2 - 1.0 MG/DL    ALT (SGPT) 59 16 - 61 U/L    AST (SGOT) 27 10 - 38 U/L    Alk.  phosphatase 114 45 - 117 U/L    Protein, total 7.2 6.4 - 8.2 g/dL    Albumin 3.9 3.4 - 5.0 g/dL    Globulin 3.3 2.0 - 4.0 g/dL    A-G Ratio 1.2 0.8 - 1.7     CBC WITH AUTOMATED DIFF   Result Value Ref Range    WBC 7.0 4.6 - 13.2 K/uL    RBC 4.88 4.35 - 5.65 M/uL    HGB 15.5 13.0 - 16.0 g/dL    HCT 44.4 36.0 - 48.0 %    MCV 91.0 74.0 - 97.0 FL    MCH 31.8 24.0 - 34.0 PG    MCHC 34.9 31.0 - 37.0 g/dL    RDW 12.7 11.6 - 14.5 %    PLATELET 608 648 - 176 K/uL    MPV 9.8 9.2 - 11.8 FL    NEUTROPHILS 67 40 - 73 %    LYMPHOCYTES 24 21 - 52 %    MONOCYTES 7 3 - 10 %    EOSINOPHILS 2 0 - 5 %    BASOPHILS 1 0 - 2 %    ABS. NEUTROPHILS 4.7 1.8 - 8.0 K/UL    ABS. LYMPHOCYTES 1.7 0.9 - 3.6 K/UL    ABS. MONOCYTES 0.5 0.05 - 1.2 K/UL    ABS. EOSINOPHILS 0.1 0.0 - 0.4 K/UL    ABS. BASOPHILS 0.1 0.0 - 0.1 K/UL    DF AUTOMATED     ETHYL ALCOHOL   Result Value Ref Range    ALCOHOL(ETHYL),SERUM <3 0 - 3 MG/DL   DRUG SCREEN, URINE   Result Value Ref Range    BENZODIAZEPINES Negative NEG      BARBITURATES Negative NEG      THC (TH-CANNABINOL) Positive (A) NEG      OPIATES Negative NEG      PCP(PHENCYCLIDINE) Negative NEG      COCAINE Negative NEG      AMPHETAMINES Negative NEG      METHADONE Negative NEG      HDSCOM (NOTE)         DISCHARGE MENTAL STATUS EVALUATION     Appearance/Hygiene 31 y.o.  WHITE male  Hygiene: Good   Attitude/Behavior/Social Relatedness Appropriate   Musculoskeletal Gait/Station: appropriate  Tone (flaccid, cogwheeling, spastic): not assessed  Psychomotor (hyperkinetic, hypokinetic): calm  Involuntary movements (tics, dyskinesias, akathisa, stereotypies): none   Speech   Rate, rhythm, volume, fluency and articulation are appropriate   Mood   euthymic   Affect    congruent   Thought Process Linear and goal directed   Thought Content and Perceptual Disturbances Denies self-injurious behavior (SIB), suicidal ideation (SI), aggressive behavior or homicidal ideation (HI)    Denies auditory and visual hallucinations   Sensorium and Cognition Grossly intact   Insight  fair   Judgment  fair       SUICIDE RISK ASSESSMENT     [] Admission  [x] Discharge     Key Factors:   Current admission precipitated by suicide attempt?   []  Yes     2    [x]  No     1     Suicide Attempt History  [] Past attempts of high lethality    2 [x]  Past attempts of low lethality    1 []  No previous attempts       0   Suicidal Ideation []  Constant suicidal thoughts      2 []  Intermittent or fleeting suicidal  thoughts  1 [x]  Denies current suicidal thoughts    0   Suicide Plan   []  Has plan with actual OR potential access to planned method    2 []  Has plan without access to planned method      1 [x]  No plan            0   Plan Lethality []  Highly lethal plan (Carbon monoxide, gun, hanging, jumping)    2 []  Moderate lethality of plan          1 []  Low lethality of plan (biting, head banging, superficial scratching, pillow over face)  0   Safety Plan Agreement  []  Unwilling OR unable to agree due to impaired reality testing   2   []  Patient is ambivalent and/or guarded      1 [x]  Reliably agrees        0   Current Morbid Thoughts (reunion fantasies, preoccupations with death) []  Constantly     2     []  Frequently    1 [x]  Rarely    0   Elopement Risk  []  High risk     2 []  Moderate risk    1 [x]   Low risk    0   Symptoms    []  Hopeless  []  Helpless  []  Anhedonia   []  Guilt/shame  []  Anger/rage  []  Anxiety  []  Insomnia   []  Agitation   []  Impulsivity  []  5-6 symptoms present    2 []  3-4 symptoms present    1  [x]  0-2 symptoms present    0     Scoring Key:  10 or higher = Imminent Risk (consider 1:1)  4 - 9 = Moderate Risk (consider q 15 minute observation)Attended alcohol, tobacco, prescription and other drug psychoeducation group.   0 - 3 = Low Risk (consider q 30 minute observation)    Total Score: 2  ------------------------------------------------------------------------------------------------------------------  PLEASE ADDRESS THE FOLLOWING 5 ISSUES     Physician's Subjective Appraisal of Risk (check one):  []  Patient replies not trustworthy: several non-verbal cues. []  Patient replies questionable: trustworthy: at least 1 non-verbal cue. [x]  Patient replies appear trustworthy. Family History of Suicide? []  Yes  [x]  No    Protective measures (select all that apply):  [x]  Successful past responses to stress  []  Spiritual/Worship beliefs  [x]  Capacity for reality testing  [x]  Positive therapeutic relationships  [x]  Social supports/connections  []  Positive coping skills  []  Frustration tolerance/optimism  []  Children or pets in the home  []  Sense of responsibility to family  [x]  Agrees to treatment plan and follow up    Others (list):    High Risk Diagnoses (select all that apply):  [x]  Depression/Bipolar Disorder  [x]  Dual Diagnosis  []  Cardiovascular Disease  [x]  Schizophrenia  []  Chronic Pain  []  Epilepsy  []  Cancer  []  Personality Disorder  []  HIV/AIDS  []  Multiple Sclerosis    Dangerousness Assessment (Suicide, homicide, property destruction. ..)    Risk Factors reviewed and risk assessed to be:  [] low  [x] low-moderate  [] moderate   [] moderate-high  [] high     Protection factors reviewed and risk assessed to be:  [x] low  [] low-moderate  [] moderate   [] moderate-high  [] high     Response to treatment and risk assessed to be:  [x] low  [] low-moderate  [] moderate   [] moderate-high  [] high     Support reviewed and risk assessed to be:  [x] low  [] low-moderate  [] moderate   [] moderate-high  [] high     Acceptance of Discharge and outpatient treatment reviewed and risk assessed to be:    [x] low  [] low-moderate  [] moderate   [] moderate-high  [] high   Overall risk assessed to be:  [x] low  [] low-moderate  [] moderate   [] moderate-high  [] high     Completion of discharge was greater than 30 minutes. Over 50% of today's discharge was geared towards counseling and coordination of care. Randell Anderson MD  Psychiatry  [facility]

## 2022-01-07 ENCOUNTER — HOSPITAL ENCOUNTER (EMERGENCY)
Age: 27
Discharge: HOME | End: 2022-01-07
Attending: EMERGENCY MEDICINE
Payer: MEDICAID

## 2022-01-07 ENCOUNTER — APPOINTMENT (OUTPATIENT)
Dept: GENERAL RADIOLOGY | Age: 27
End: 2022-01-07
Attending: PHYSICIAN ASSISTANT
Payer: MEDICAID

## 2022-01-07 ENCOUNTER — APPOINTMENT (OUTPATIENT)
Dept: CT IMAGING | Age: 27
End: 2022-01-07
Attending: PHYSICIAN ASSISTANT
Payer: MEDICAID

## 2022-01-07 VITALS
DIASTOLIC BLOOD PRESSURE: 85 MMHG | SYSTOLIC BLOOD PRESSURE: 140 MMHG | RESPIRATION RATE: 20 BRPM | HEART RATE: 105 BPM | OXYGEN SATURATION: 99 % | TEMPERATURE: 98.2 F

## 2022-01-07 DIAGNOSIS — Z20.822 SUSPECTED COVID-19 VIRUS INFECTION: Primary | ICD-10-CM

## 2022-01-07 LAB
ALBUMIN SERPL-MCNC: 4 G/DL (ref 3.4–5)
ALBUMIN/GLOB SERPL: 1.3 {RATIO} (ref 0.8–1.7)
ALP SERPL-CCNC: 115 U/L (ref 45–117)
ALT SERPL-CCNC: 39 U/L (ref 16–61)
ANION GAP SERPL CALC-SCNC: 4 MMOL/L (ref 3–18)
AST SERPL-CCNC: 14 U/L (ref 10–38)
ATRIAL RATE: 103 BPM
BASOPHILS # BLD: 0.1 K/UL (ref 0–0.1)
BASOPHILS NFR BLD: 1 % (ref 0–2)
BILIRUB SERPL-MCNC: 0.4 MG/DL (ref 0.2–1)
BUN SERPL-MCNC: 10 MG/DL (ref 7–18)
BUN/CREAT SERPL: 13 (ref 12–20)
CALCIUM SERPL-MCNC: 9.4 MG/DL (ref 8.5–10.1)
CALCULATED P AXIS, ECG09: 70 DEGREES
CALCULATED R AXIS, ECG10: 29 DEGREES
CALCULATED T AXIS, ECG11: 38 DEGREES
CHLORIDE SERPL-SCNC: 105 MMOL/L (ref 100–111)
CO2 SERPL-SCNC: 29 MMOL/L (ref 21–32)
CREAT SERPL-MCNC: 0.8 MG/DL (ref 0.6–1.3)
DIAGNOSIS, 93000: NORMAL
DIFFERENTIAL METHOD BLD: NORMAL
EOSINOPHIL # BLD: 0.1 K/UL (ref 0–0.4)
EOSINOPHIL NFR BLD: 1 % (ref 0–5)
ERYTHROCYTE [DISTWIDTH] IN BLOOD BY AUTOMATED COUNT: 12.1 % (ref 11.6–14.5)
GLOBULIN SER CALC-MCNC: 3.1 G/DL (ref 2–4)
GLUCOSE SERPL-MCNC: 78 MG/DL (ref 74–99)
HCT VFR BLD AUTO: 45.8 % (ref 36–48)
HGB BLD-MCNC: 15.5 G/DL (ref 13–16)
IMM GRANULOCYTES # BLD AUTO: 0 K/UL (ref 0–0.04)
IMM GRANULOCYTES NFR BLD AUTO: 0 % (ref 0–0.5)
LYMPHOCYTES # BLD: 1.4 K/UL (ref 0.9–3.6)
LYMPHOCYTES NFR BLD: 22 % (ref 21–52)
MCH RBC QN AUTO: 30.3 PG (ref 24–34)
MCHC RBC AUTO-ENTMCNC: 33.8 G/DL (ref 31–37)
MCV RBC AUTO: 89.6 FL (ref 78–100)
MONOCYTES # BLD: 0.5 K/UL (ref 0.05–1.2)
MONOCYTES NFR BLD: 8 % (ref 3–10)
NEUTS SEG # BLD: 4.3 K/UL (ref 1.8–8)
NEUTS SEG NFR BLD: 68 % (ref 40–73)
NRBC # BLD: 0 K/UL (ref 0–0.01)
NRBC BLD-RTO: 0 PER 100 WBC
P-R INTERVAL, ECG05: 122 MS
PLATELET # BLD AUTO: 152 K/UL (ref 135–420)
PMV BLD AUTO: 9.2 FL (ref 9.2–11.8)
POTASSIUM SERPL-SCNC: 4.3 MMOL/L (ref 3.5–5.5)
PROT SERPL-MCNC: 7.1 G/DL (ref 6.4–8.2)
Q-T INTERVAL, ECG07: 324 MS
QRS DURATION, ECG06: 90 MS
QTC CALCULATION (BEZET), ECG08: 424 MS
RBC # BLD AUTO: 5.11 M/UL (ref 4.35–5.65)
SARS-COV-2, NAA: NOT DETECTED
SODIUM SERPL-SCNC: 138 MMOL/L (ref 136–145)
TROPONIN-HIGH SENSITIVITY: 3 NG/L (ref 0–78)
VENTRICULAR RATE, ECG03: 103 BPM
WBC # BLD AUTO: 6.4 K/UL (ref 4.6–13.2)

## 2022-01-07 PROCEDURE — 80053 COMPREHEN METABOLIC PANEL: CPT

## 2022-01-07 PROCEDURE — 71046 X-RAY EXAM CHEST 2 VIEWS: CPT

## 2022-01-07 PROCEDURE — 84484 ASSAY OF TROPONIN QUANT: CPT

## 2022-01-07 PROCEDURE — 85025 COMPLETE CBC W/AUTO DIFF WBC: CPT

## 2022-01-07 PROCEDURE — 71275 CT ANGIOGRAPHY CHEST: CPT

## 2022-01-07 PROCEDURE — 74011000636 HC RX REV CODE- 636: Performed by: EMERGENCY MEDICINE

## 2022-01-07 PROCEDURE — 99283 EMERGENCY DEPT VISIT LOW MDM: CPT

## 2022-01-07 PROCEDURE — 93005 ELECTROCARDIOGRAM TRACING: CPT

## 2022-01-07 PROCEDURE — U0003 INFECTIOUS AGENT DETECTION BY NUCLEIC ACID (DNA OR RNA); SEVERE ACUTE RESPIRATORY SYNDROME CORONAVIRUS 2 (SARS-COV-2) (CORONAVIRUS DISEASE [COVID-19]), AMPLIFIED PROBE TECHNIQUE, MAKING USE OF HIGH THROUGHPUT TECHNOLOGIES AS DESCRIBED BY CMS-2020-01-R: HCPCS

## 2022-01-07 RX ADMIN — IOPAMIDOL 62 ML: 755 INJECTION, SOLUTION INTRAVENOUS at 12:34

## 2022-01-07 NOTE — ED PROVIDER NOTES
EMERGENCY DEPARTMENT HISTORY AND PHYSICAL EXAM      Date: 1/7/2022  Patient Name: Arianne Johnson    History of Presenting Illness     Chief Complaint   Patient presents with    Flu Like Symptoms    Covid Testing       History Provided By: Patient    HPI: Arianne Johnson, 31 y.o. male PMHx significant for schizophrenia, anxiety, asperger's, ADHD, presents ambulatory to the ED with cc of productive cough, myalgias, subjective fever, chills x 3 days. Denies known exposure to COVID. Patient has not been vaccinated against COVID. Patient also reports intermittent generalized nonradiating chest pain with intermittent shortness of breath. Patient also reports hemoptysis and pleuritic chest pain. Denies history of blood clot. Denies taking blood thinners. Denies lower leg pain or swelling. Patient has not taken anything for symptoms. Denies cardiac history. There are no other complaints, changes, or physical findings at this time. PCP: Serafin Mcguire NP    No current facility-administered medications on file prior to encounter. Current Outpatient Medications on File Prior to Encounter   Medication Sig Dispense Refill    traZODone (DESYREL) 100 mg tablet TAKE 1 TABLET BY MOUTH AT BEDTIME  Indications: insomnia associated with depression 30 Tab 0    ARIPiprazole (ABILIFY) 30 mg tablet Take 1 Tab by mouth daily. Indications: schizophrenia 30 Tab 0    venlafaxine-SR (EFFEXOR-XR) 150 mg capsule Take 1 Cap by mouth daily (with breakfast).  Indications: major depressive disorder 30 Cap 0       Past History     Past Medical History:  Past Medical History:   Diagnosis Date    ADHD     Anxiety     Asperger's disorder     Bipolar 1 disorder ([facility]     Depression     Intermittent explosive disorder     Psychiatric disorder     schitzophrenic    Schizophrenia, schizo-affective ([facility]        Past Surgical History:  Past Surgical History:   Procedure Laterality Date    HX TONSIL AND ADENOIDECTOMY Family History:  Family History   Problem Relation Age of Onset    Diabetes Mother     Sleep Apnea Mother        Social History:  Social History     Tobacco Use    Smoking status: Current Every Day Smoker     Packs/day: 1.00     Years: 10.00     Pack years: 10.00    Smokeless tobacco: Never Used   Substance Use Topics    Alcohol use: No    Drug use: Yes     Types: Marijuana     Comment: history of abuse of crack       Allergies:  No Known Allergies      Review of Systems   Review of Systems   Constitutional: Negative for chills and fever. HENT: Negative for facial swelling. Eyes: Negative for photophobia and visual disturbance. Respiratory: Positive for cough and shortness of breath. Hemoptysis   Cardiovascular: Positive for chest pain. Gastrointestinal: Negative for abdominal pain, nausea and vomiting. Genitourinary: Negative for flank pain. Skin: Negative for color change, pallor, rash and wound. Neurological: Negative for dizziness, weakness, light-headedness and headaches. All other systems reviewed and are negative. Physical Exam   Physical Exam  Vitals and nursing note reviewed. Constitutional:       General: He is not in acute distress. Appearance: He is well-developed. Comments: Pt in NAD   HENT:      Head: Normocephalic and atraumatic. Eyes:      Conjunctiva/sclera: Conjunctivae normal.   Cardiovascular:      Rate and Rhythm: Normal rate and regular rhythm. Heart sounds: Normal heart sounds. Comments: No lower leg pain or swelling  Pulmonary:      Effort: Pulmonary effort is normal. No respiratory distress. Breath sounds: Normal breath sounds. Comments: Lungs CTA  Not working to breathe  Abdominal:      General: Bowel sounds are normal.      Palpations: Abdomen is soft. Tenderness: There is no abdominal tenderness. Musculoskeletal:         General: Normal range of motion. Skin:     General: Skin is warm.       Findings: No rash.   Neurological:      Mental Status: He is alert and oriented to person, place, and time. Cranial Nerves: No cranial nerve deficit. Psychiatric:         Behavior: Behavior normal.         Diagnostic Study Results     Labs -     Recent Results (from the past 12 hour(s))   EKG, 12 LEAD, INITIAL    Collection Time: 01/07/22 10:53 AM   Result Value Ref Range    Ventricular Rate 103 BPM    Atrial Rate 103 BPM    P-R Interval 122 ms    QRS Duration 90 ms    Q-T Interval 324 ms    QTC Calculation (Bezet) 424 ms    Calculated P Axis 70 degrees    Calculated R Axis 29 degrees    Calculated T Axis 38 degrees    Diagnosis       Sinus tachycardia  Nonspecific T wave abnormality  Abnormal ECG  No previous ECGs available     CBC WITH AUTOMATED DIFF    Collection Time: 01/07/22 11:00 AM   Result Value Ref Range    WBC 6.4 4.6 - 13.2 K/uL    RBC 5.11 4.35 - 5.65 M/uL    HGB 15.5 13.0 - 16.0 g/dL    HCT 45.8 36.0 - 48.0 %    MCV 89.6 78.0 - 100.0 FL    MCH 30.3 24.0 - 34.0 PG    MCHC 33.8 31.0 - 37.0 g/dL    RDW 12.1 11.6 - 14.5 %    PLATELET 765 501 - 079 K/uL    MPV 9.2 9.2 - 11.8 FL    NRBC 0.0 0  WBC    ABSOLUTE NRBC 0.00 0.00 - 0.01 K/uL    NEUTROPHILS 68 40 - 73 %    LYMPHOCYTES 22 21 - 52 %    MONOCYTES 8 3 - 10 %    EOSINOPHILS 1 0 - 5 %    BASOPHILS 1 0 - 2 %    IMMATURE GRANULOCYTES 0 0.0 - 0.5 %    ABS. NEUTROPHILS 4.3 1.8 - 8.0 K/UL    ABS. LYMPHOCYTES 1.4 0.9 - 3.6 K/UL    ABS. MONOCYTES 0.5 0.05 - 1.2 K/UL    ABS. EOSINOPHILS 0.1 0.0 - 0.4 K/UL    ABS. BASOPHILS 0.1 0.0 - 0.1 K/UL    ABS. IMM.  GRANS. 0.0 0.00 - 0.04 K/UL    DF AUTOMATED     METABOLIC PANEL, COMPREHENSIVE    Collection Time: 01/07/22 11:00 AM   Result Value Ref Range    Sodium 138 136 - 145 mmol/L    Potassium 4.3 3.5 - 5.5 mmol/L    Chloride 105 100 - 111 mmol/L    CO2 29 21 - 32 mmol/L    Anion gap 4 3.0 - 18 mmol/L    Glucose 78 74 - 99 mg/dL    BUN 10 7.0 - 18 MG/DL    Creatinine 0.80 0.6 - 1.3 MG/DL    BUN/Creatinine ratio 13 12 - 20      GFR est AA >60 >60 ml/min/1.73m2    GFR est non-AA >60 >60 ml/min/1.73m2    Calcium 9.4 8.5 - 10.1 MG/DL    Bilirubin, total 0.4 0.2 - 1.0 MG/DL    ALT (SGPT) 39 16 - 61 U/L    AST (SGOT) 14 10 - 38 U/L    Alk. phosphatase 115 45 - 117 U/L    Protein, total 7.1 6.4 - 8.2 g/dL    Albumin 4.0 3.4 - 5.0 g/dL    Globulin 3.1 2.0 - 4.0 g/dL    A-G Ratio 1.3 0.8 - 1.7     TROPONIN-HIGH SENSITIVITY    Collection Time: 01/07/22 11:00 AM   Result Value Ref Range    Troponin-High Sensitivity 3 0 - 78 ng/L       Radiologic Studies -   CTA CHEST W OR W WO CONT   Final Result      1. No acute abnormality. Specifically, no pulmonary embolism. Thank you for enabling us to participate in the care of this patient. XR CHEST PA LAT   Final Result      Negative study. CT Results  (Last 48 hours)               01/07/22 1235  CTA CHEST W OR W WO CONT Final result    Impression:      1. No acute abnormality. Specifically, no pulmonary embolism. Thank you for enabling us to participate in the care of this patient. Narrative:  EXAM: CTA CHEST PULMONARY EMBOLISM        CLINICAL INDICATION/HISTORY: hemoptysis, cough, concern for pe. Cough, chills,   fever, and generalized body aches for 3 days. COMPARISON:  Chest radiograph earlier today       TECHNIQUE: CTA of the chest was performed using timing optimized for pulmonary   embolism technique, with IV contrast.  To maximize sensitivity the sagittal and   coronal reconstructions were created using a 3D multislice MIP (maximal   intensity projection) methodology. CT scans at this facility are performed using dose optimization technique as   appropriate to the performed exam, to include automated exposure control,   adjustment of the mA and/or kV according to patient size (including appropriate   matching for site specific examinations), or use of iterative reconstruction   technique. FINDINGS:       Lung/Airway:  Unremarkable. Lower neck: Unremarkable. Mediastinum:  No mediastinal lymphadenopathy. Pulmonary arteries (includes assessment of MIP images): Main pulmonary artery   measures less than 3 cm. No filling defect is seen in the main, lobar, or   visualized segmental pulmonary arteries. Aorta and other cardiovascular structures: No aortic aneurysm or dissection. Left gastric artery arises as a separate branch directly from the aorta, normal   variant. Heart Strain assessment:   -  RV/LV ratio (normal <0.9): Normal   -  Dysfunction or bowing of interventricular septum: None   -  There is not visualization of contrast reflux from the right heart into the   IVC/hepatic veins. Upper abdominal structures: Unremarkable. Chest wall: Unremarkable. Bones: No acute osseous abnormality. CXR Results  (Last 48 hours)               01/07/22 1047  XR CHEST PA LAT Final result    Impression:      Negative study. Narrative:  EXAM:  Chest Frontal and Lateral.       INDICATION:  Cough for 3 days        COMPARISON:  None. TECHNIQUE:  PA and lateral views. FINDINGS:        - Both lungs are clear.    - No pleural effusion or pneumothorax is detected. - Cardiac silhouette, mediastinum and hilar regions are unremarkable. Medical Decision Making   I am the first provider for this patient. I reviewed the vital signs, available nursing notes, past medical history, past surgical history, family history and social history. Vital Signs-Reviewed the patient's vital signs. Patient Vitals for the past 12 hrs:   Temp Pulse Resp BP SpO2   01/07/22 1026 98.2 °F (36.8 °C) (!) 105 20 (!) 140/85 99 %         EKG interpretation: (Preliminary)  Rhythm: sinus tachycardia; and regular . Rate (approx.): 103; Axis: normal; KY interval: normal; QRS interval: normal ; ST/T wave: non-specific changes; Other findings: normal.    No acute ischemic changes.     Records Reviewed: Nursing Notes, Old Medical Records, Previous electrocardiograms, Previous Radiology Studies and Previous Laboratory Studies    Provider Notes (Medical Decision Making):   DDx: COVID, PNA, ACS, PE    30 yo M who presents with productive cough, hemoptysis, intermittent SOB and chest pain x3 days. Denies exposure to COVID. Has not received COVID-vaccine. On exam lungs CTA not working to breathe. Normal oxygen saturation and respiratory rate. EKG shows no ischemic changes and negative troponin. CT negative for PE.  COVID swab sent. Suspect likely COVID and discussed importance of self-isolation. Discussed importance of returning if symptoms or symptoms. At time of discharge, pt non-toxic appearing in NAD. Pt stable for prompt outpatient follow-up with PCP 1 to 2 days. Patient given strict instructions to return if symptoms worsen. ED Course:   Initial assessment performed. The patients presenting problems have been discussed, and they are in agreement with the care plan formulated and outlined with them. I have encouraged them to ask questions as they arise throughout their visit. Disposition:  2:29 PM  Discussed lab and imaging results with pt along with dx and treatment plan. Discussed importance of PCP follow up. All questions answered. Pt voiced they understood. Return if sx worsen. PLAN:  1. Current Discharge Medication List        2. Follow-up Information     Follow up With Specialties Details Why Contact Info    Daly Hutson NP Nurse Practitioner Schedule an appointment as soon as possible for a visit in 1 day  [address] [place] [facility] EMERGENCY DEPT Emergency Medicine  As needed, If symptoms worsen [address] [place] [id number]  [phone]        Return to ED if worse     Diagnosis     Clinical Impression:   1.  Suspected COVID-19 virus infection        Attestations:    JUSTIN Raya    Please note that this dictation was completed with [employer], the computer voice recognition software. Quite often unanticipated grammatical, syntax, homophones, and other interpretive errors are inadvertently transcribed by the computer software. Please disregard these errors. Please excuse any errors that have escaped final proofreading. Thank you.

## 2022-01-07 NOTE — Clinical Note
[address] [place] [address] Dr [facility] EMERGENCY DEPT  [id number] [address] [phone]    Work/School Note    Date: 1/7/2022     To Whom It May concern:    Hosea Burrell was evaluated by the following provider(s):  Attending Provider: Ra Ybarra MD  Physician Assistant: Aliyah Penn, [address] [place] [address] virus is suspected. Per the CDC guidelines we recommend home isolation until the following conditions are all met:    1. At least five days have passed since symptoms first appeared and/or had a close exposure,   2. After home isolation for five days, wearing a mask around others for the next five days,  3. At least 24 have passed since last fever without the use of fever-reducing medications and  4.  Symptoms (eg cough, shortness of breath) have improved      Sincerely,          JUSTIN Marte

## 2022-01-18 ENCOUNTER — HOSPITAL ENCOUNTER (INPATIENT)
Age: 27
LOS: 13 days | Discharge: HOME | DRG: 750 | End: 2022-01-31
Attending: EMERGENCY MEDICINE | Admitting: PSYCHIATRY & NEUROLOGY
Payer: MEDICAID

## 2022-01-18 DIAGNOSIS — R45.851 SUICIDAL IDEATION: Primary | ICD-10-CM

## 2022-01-18 DIAGNOSIS — F25.1 SCHIZOAFFECTIVE DISORDER, DEPRESSIVE TYPE (HCC): ICD-10-CM

## 2022-01-18 PROBLEM — F25.9 SCHIZOAFFECTIVE DISORDER (HCC): Status: ACTIVE | Noted: 2022-01-18

## 2022-01-18 LAB
AMPHET UR QL SCN: NEGATIVE
ANION GAP SERPL CALC-SCNC: 3 MMOL/L (ref 3–18)
BARBITURATES UR QL SCN: NEGATIVE
BASOPHILS # BLD: 0 K/UL (ref 0–0.1)
BASOPHILS NFR BLD: 1 % (ref 0–2)
BENZODIAZ UR QL: NEGATIVE
BUN SERPL-MCNC: 13 MG/DL (ref 7–18)
BUN/CREAT SERPL: 14 (ref 12–20)
CALCIUM SERPL-MCNC: 9.5 MG/DL (ref 8.5–10.1)
CANNABINOIDS UR QL SCN: POSITIVE
CHLORIDE SERPL-SCNC: 105 MMOL/L (ref 100–111)
CO2 SERPL-SCNC: 30 MMOL/L (ref 21–32)
COCAINE UR QL SCN: NEGATIVE
COVID-19 RAPID TEST, COVR: NOT DETECTED
CREAT SERPL-MCNC: 0.95 MG/DL (ref 0.6–1.3)
DIFFERENTIAL METHOD BLD: NORMAL
EOSINOPHIL # BLD: 0 K/UL (ref 0–0.4)
EOSINOPHIL NFR BLD: 1 % (ref 0–5)
ERYTHROCYTE [DISTWIDTH] IN BLOOD BY AUTOMATED COUNT: 12.4 % (ref 11.6–14.5)
ETHANOL SERPL-MCNC: <3 MG/DL (ref 0–3)
GLUCOSE SERPL-MCNC: 93 MG/DL (ref 74–99)
HCT VFR BLD AUTO: 43.4 % (ref 36–48)
HDSCOM,HDSCOM: ABNORMAL
HGB BLD-MCNC: 15 G/DL (ref 13–16)
IMM GRANULOCYTES # BLD AUTO: 0 K/UL (ref 0–0.04)
IMM GRANULOCYTES NFR BLD AUTO: 0 % (ref 0–0.5)
LYMPHOCYTES # BLD: 1.7 K/UL (ref 0.9–3.6)
LYMPHOCYTES NFR BLD: 28 % (ref 21–52)
MCH RBC QN AUTO: 30.5 PG (ref 24–34)
MCHC RBC AUTO-ENTMCNC: 34.6 G/DL (ref 31–37)
MCV RBC AUTO: 88.2 FL (ref 78–100)
METHADONE UR QL: NEGATIVE
MONOCYTES # BLD: 0.4 K/UL (ref 0.05–1.2)
MONOCYTES NFR BLD: 7 % (ref 3–10)
NEUTS SEG # BLD: 3.9 K/UL (ref 1.8–8)
NEUTS SEG NFR BLD: 63 % (ref 40–73)
NRBC # BLD: 0 K/UL (ref 0–0.01)
NRBC BLD-RTO: 0 PER 100 WBC
OPIATES UR QL: NEGATIVE
PCP UR QL: NEGATIVE
PLATELET # BLD AUTO: 148 K/UL (ref 135–420)
PMV BLD AUTO: 9.7 FL (ref 9.2–11.8)
POTASSIUM SERPL-SCNC: 3.7 MMOL/L (ref 3.5–5.5)
RBC # BLD AUTO: 4.92 M/UL (ref 4.35–5.65)
SODIUM SERPL-SCNC: 138 MMOL/L (ref 136–145)
SOURCE, COVRS: NORMAL
WBC # BLD AUTO: 6.1 K/UL (ref 4.6–13.2)

## 2022-01-18 PROCEDURE — 80307 DRUG TEST PRSMV CHEM ANLYZR: CPT

## 2022-01-18 PROCEDURE — 99285 EMERGENCY DEPT VISIT HI MDM: CPT

## 2022-01-18 PROCEDURE — 74011250637 HC RX REV CODE- 250/637: Performed by: PSYCHIATRY & NEUROLOGY

## 2022-01-18 PROCEDURE — 82077 ASSAY SPEC XCP UR&BREATH IA: CPT

## 2022-01-18 PROCEDURE — 65220000003 HC RM SEMIPRIVATE PSYCH

## 2022-01-18 PROCEDURE — 80048 BASIC METABOLIC PNL TOTAL CA: CPT

## 2022-01-18 PROCEDURE — 85025 COMPLETE CBC W/AUTO DIFF WBC: CPT

## 2022-01-18 PROCEDURE — 87635 SARS-COV-2 COVID-19 AMP PRB: CPT

## 2022-01-18 RX ORDER — HALOPERIDOL 5 MG/1
5 TABLET ORAL
Status: DISCONTINUED | OUTPATIENT
Start: 2022-01-18 | End: 2022-01-31 | Stop reason: HOSPADM

## 2022-01-18 RX ORDER — VENLAFAXINE HYDROCHLORIDE 150 MG/1
150 CAPSULE, EXTENDED RELEASE ORAL DAILY
Status: DISCONTINUED | OUTPATIENT
Start: 2022-01-19 | End: 2022-01-19

## 2022-01-18 RX ORDER — HALOPERIDOL 5 MG/ML
5 INJECTION INTRAMUSCULAR
Status: DISCONTINUED | OUTPATIENT
Start: 2022-01-18 | End: 2022-01-31 | Stop reason: HOSPADM

## 2022-01-18 RX ORDER — BENZTROPINE MESYLATE 1 MG/1
1 TABLET ORAL
Status: DISCONTINUED | OUTPATIENT
Start: 2022-01-18 | End: 2022-01-31 | Stop reason: HOSPADM

## 2022-01-18 RX ORDER — HALOPERIDOL 5 MG/1
5 TABLET ORAL
Status: DISCONTINUED | OUTPATIENT
Start: 2022-01-18 | End: 2022-01-18 | Stop reason: SDUPTHER

## 2022-01-18 RX ORDER — LORAZEPAM 2 MG/ML
1-2 INJECTION INTRAMUSCULAR
Status: DISCONTINUED | OUTPATIENT
Start: 2022-01-18 | End: 2022-01-19

## 2022-01-18 RX ORDER — HYDROXYZINE PAMOATE 50 MG/1
50 CAPSULE ORAL
Status: DISCONTINUED | OUTPATIENT
Start: 2022-01-18 | End: 2022-01-31 | Stop reason: HOSPADM

## 2022-01-18 RX ORDER — TRAZODONE HYDROCHLORIDE 100 MG/1
100 TABLET ORAL
Status: DISCONTINUED | OUTPATIENT
Start: 2022-01-18 | End: 2022-01-20

## 2022-01-18 RX ORDER — BENZTROPINE MESYLATE 1 MG/ML
1 INJECTION INTRAMUSCULAR; INTRAVENOUS
Status: DISCONTINUED | OUTPATIENT
Start: 2022-01-18 | End: 2022-01-31 | Stop reason: HOSPADM

## 2022-01-18 RX ADMIN — HALOPERIDOL 5 MG: 5 TABLET ORAL at 18:22

## 2022-01-18 RX ADMIN — HYDROXYZINE PAMOATE 50 MG: 50 CAPSULE ORAL at 20:14

## 2022-01-18 RX ADMIN — TRAZODONE HYDROCHLORIDE 100 MG: 100 TABLET ORAL at 20:14

## 2022-01-18 NOTE — ED PROVIDER NOTES
EMERGENCY DEPARTMENT HISTORY AND PHYSICAL EXAM    11:51 AM      Date: 1/18/2022  Patient Name: Pinky Arias    History of Presenting Illness     Chief Complaint   Patient presents with   [address] Problem    Illness       History Provided By: Patient    Additional History (Context): Pinky Arias is a 31 y.o. male with History of ADHD, anxiety, Asperger's, bipolar, depression, schizoaffective who presents with complaint of suicidal ideation with a plan to take all of his medication. Patient has auditory and visual hallucinations, would not elaborate, \"it is too scary\". Patient denies HI. Denies alcohol or drug use. Patient notes concern for COVID. Notes myalgias and rhinorrhea x13 days. Notes he is not vaccinated for COVID. Denies sick contacts or known exposure. PCP: Lena Steve NP    Current Outpatient Medications   Medication Sig Dispense Refill    traZODone (DESYREL) 100 mg tablet TAKE 1 TABLET BY MOUTH AT BEDTIME  Indications: insomnia associated with depression 30 Tab 0    ARIPiprazole (ABILIFY) 30 mg tablet Take 1 Tab by mouth daily. Indications: schizophrenia 30 Tab 0    venlafaxine-SR (EFFEXOR-XR) 150 mg capsule Take 1 Cap by mouth daily (with breakfast).  Indications: major depressive disorder 30 Cap 0       Past History     Past Medical History:  Past Medical History:   Diagnosis Date    ADHD     Anxiety     Asperger's disorder     Bipolar 1 disorder ([address]     Depression     Intermittent explosive disorder     Psychiatric disorder     schitzophrenic    Schizophrenia, schizo-affective ([facility]        Past Surgical History:  Past Surgical History:   Procedure Laterality Date    HX TONSIL AND ADENOIDECTOMY         Family History:  Family History   Problem Relation Age of Onset    Diabetes Mother     Sleep Apnea Mother        Social History:  Social History     Tobacco Use    Smoking status: Current Every Day Smoker     Packs/day: 1.00     Years: 10.00     Pack years: 10.00    Smokeless tobacco: Never Used   Substance Use Topics    Alcohol use: No    Drug use: Yes     Types: Marijuana     Comment: history of abuse of crack       Allergies:  No Known Allergies      Review of Systems       Review of Systems   Constitutional: Negative for chills and fever. HENT: Positive for rhinorrhea. Respiratory: Negative for shortness of breath. Cardiovascular: Negative for chest pain. Gastrointestinal: Negative for abdominal pain, nausea and vomiting. Musculoskeletal: Positive for myalgias. Skin: Negative for rash. Neurological: Negative for weakness. Psychiatric/Behavioral: Positive for hallucinations and suicidal ideas. All other systems reviewed and are negative. Physical Exam     Visit Vitals  /86 (BP 1 Location: Left upper arm, BP Patient Position: At rest)   Pulse 93   Temp 98.9 °F (37.2 °C)   Resp 16   SpO2 98%         Physical Exam  Vitals and nursing note reviewed. Constitutional:       General: He is not in acute distress. Appearance: Normal appearance. He is well-developed. He is not ill-appearing, toxic-appearing or diaphoretic. HENT:      Head: Normocephalic and atraumatic. Cardiovascular:      Rate and Rhythm: Normal rate and regular rhythm. Heart sounds: Normal heart sounds. No murmur heard. No friction rub. No gallop. Pulmonary:      Effort: Pulmonary effort is normal. No respiratory distress. Breath sounds: Normal breath sounds. No wheezing or rales. Musculoskeletal:         General: Normal range of motion. Cervical back: Normal range of motion and neck supple. Skin:     General: Skin is warm. Findings: No rash. Neurological:      General: No focal deficit present. Mental Status: He is alert. Psychiatric:         Attention and Perception: Attention normal.         Mood and Affect: Mood normal. Affect is blunt.          Speech: Speech normal.         Behavior: Behavior normal.         Thought Content: Thought content includes suicidal ideation. Thought content does not include homicidal ideation. Thought content includes suicidal plan. Diagnostic Study Results     Labs -  Recent Results (from the past 12 hour(s))   DRUG SCREEN, URINE    Collection Time: 01/18/22 11:12 AM   Result Value Ref Range    BENZODIAZEPINES Negative NEG      BARBITURATES Negative NEG      THC (TH-CANNABINOL) Positive (A) NEG      OPIATES Negative NEG      PCP(PHENCYCLIDINE) Negative NEG      COCAINE Negative NEG      AMPHETAMINES Negative NEG      METHADONE Negative NEG      HDSCOM (NOTE)    CBC WITH AUTOMATED DIFF    Collection Time: 01/18/22  2:06 PM   Result Value Ref Range    WBC 6.1 4.6 - 13.2 K/uL    RBC 4.92 4.35 - 5.65 M/uL    HGB 15.0 13.0 - 16.0 g/dL    HCT 43.4 36.0 - 48.0 %    MCV 88.2 78.0 - 100.0 FL    MCH 30.5 24.0 - 34.0 PG    MCHC 34.6 31.0 - 37.0 g/dL    RDW 12.4 11.6 - 14.5 %    PLATELET 065 032 - 470 K/uL    MPV 9.7 9.2 - 11.8 FL    NRBC 0.0 0  WBC    ABSOLUTE NRBC 0.00 0.00 - 0.01 K/uL    NEUTROPHILS 63 40 - 73 %    LYMPHOCYTES 28 21 - 52 %    MONOCYTES 7 3 - 10 %    EOSINOPHILS 1 0 - 5 %    BASOPHILS 1 0 - 2 %    IMMATURE GRANULOCYTES 0 0.0 - 0.5 %    ABS. NEUTROPHILS 3.9 1.8 - 8.0 K/UL    ABS. LYMPHOCYTES 1.7 0.9 - 3.6 K/UL    ABS. MONOCYTES 0.4 0.05 - 1.2 K/UL    ABS. EOSINOPHILS 0.0 0.0 - 0.4 K/UL    ABS. BASOPHILS 0.0 0.0 - 0.1 K/UL    ABS. IMM. GRANS. 0.0 0.00 - 0.04 K/UL    DF AUTOMATED         Radiologic Studies -   No orders to display         Medical Decision Making   I am the first provider for this patient. I reviewed the vital signs, available nursing notes, past medical history, past surgical history, family history and social history. Vital Signs-Reviewed the patient's vital signs.     Records Reviewed: Nursing Notes and Old Medical Records (Time of Review: 11:51 AM)    ED Course: Progress Notes, Reevaluation, and Consults:  2:30 PM: Spoke with Perlita Sun, crisis, will admit    PROGRESS NOTE:  7:00 PM:   Patient care will be transferred to Natasha Snow PA-C. Discussed available diagnostic results and care plan at length. Pending bed assignment. Written by Ishmael Dominguez PA-C      Diagnosis     Clinical Impression:   1. Suicidal ideation        Disposition: admission     Follow-up Information    None          Patient's Medications   Start Taking    No medications on file   Continue Taking    ARIPIPRAZOLE (ABILIFY) 30 MG TABLET    Take 1 Tab by mouth daily. Indications: schizophrenia    TRAZODONE (DESYREL) 100 MG TABLET    TAKE 1 TABLET BY MOUTH AT BEDTIME  Indications: insomnia associated with depression    VENLAFAXINE-SR (EFFEXOR-XR) 150 MG CAPSULE    Take 1 Cap by mouth daily (with breakfast). Indications: major depressive disorder   These Medications have changed    No medications on file   Stop Taking    No medications on file       Dictation disclaimer:  Please note that this dictation was completed with [employer], the computer voice recognition software. Quite often unanticipated grammatical, syntax, homophones, and other interpretive errors are inadvertently transcribed by the computer software. Please disregard these errors. Please excuse any errors that have escaped final proofreading.

## 2022-01-18 NOTE — ED TRIAGE NOTES
\"I think I have covid because I have no taste and I have chills and aches. I've been feeling suicidal for the past two weeks.

## 2022-01-18 NOTE — ED NOTES
Patient has 2 large belonging bags in cabinet 7.  Patient placed in scrubs and all belongings secured

## 2022-01-18 NOTE — BSMART NOTE
Patient seen in discharge room at the request of Dr. Odalys Corral. Patient presented to the emergency room endorsing suicidal ideations with plan to \"choke myself. Patient reports he has attempted suicide multiple times. In fact he stated he has over 10 attempts. Patient endorsing CAH stating the voices are telling me \"to kill myself\". Patient also endorsing visual hallucinations. Patient reports seeing blood everywhere. Patient reports using Larrie Hilda, methamphetamines and cocaine. Patient reports smoking marijuanna daily. He reports he last smoked methamphetamine last month and inhaled cocaine. Patient denied medical hx. Patient report psychiatric hx of schizophrenia, bipolar disorder and depression. Patient denies allergies to food or medication. Patient is currently prescribed Haldol, Effexor and Trazodone. Patient reports compliance with medication regimen. Mental Status Exam    The patients appearance shows no evidence of impairment. The patients behavior shows no evidence of impairment. The patient is oriented to person, place, time and situation. The patients speech normal. The patient mood is depressed. The patients range of affect is flat. The patients thought content shows no evidence of impairment. The patients thought process shows no evidence of impairment. The patients memory shows no evidence of impairment. The patients appetite decreased. The patients sleep decreased. The patients insight fair. The patients judgement poor. Outpatient treatment: Mr Hilary York with     npatient treatment: [facility] 12/31/21 - 1/3/22    Support/: Rivas Callaway [phone] (mother)    Access to weapons: denies    Lives alone: lives in transitional house with 4 others    Education: 12 th grade    Legal issues: denies    : denies    DME: denies    Disposition: Patient is receptive to voluntary admission to [facility].  Discussed with on call psychiatrist, and JUSTIN Monahan; orders received and report given to unit nurse.

## 2022-01-18 NOTE — BH NOTES
Pt. arrived to the unit from ED under voluntary admission escorted by  Security and ED staff for suicidal ideation with the plan \"to choke self\". He reports that he has attempted > 10 times  In the past. He also endorses command auditory hallucinations, stating the voices are telling him \" to kill myself\". Pt. Is alert and oriented x 4. His affect is dull ,sad  With depressed mood. He is cooperative with the admission process. RN WILL INITIATE, IMPLEMENT, DEVELOP, REVIEW OR REVISE TREATMENT PLAN.

## 2022-01-19 PROBLEM — F25.9 SCHIZOAFFECTIVE DISORDER (HCC): Status: RESOLVED | Noted: 2022-01-18 | Resolved: 2022-01-19

## 2022-01-19 PROBLEM — F19.10 POLYSUBSTANCE ABUSE (HCC): Chronic | Status: ACTIVE | Noted: 2022-01-19

## 2022-01-19 PROCEDURE — 74011250637 HC RX REV CODE- 250/637: Performed by: PSYCHIATRY & NEUROLOGY

## 2022-01-19 PROCEDURE — 65220000003 HC RM SEMIPRIVATE PSYCH

## 2022-01-19 PROCEDURE — 99222 1ST HOSP IP/OBS MODERATE 55: CPT | Performed by: PSYCHIATRY & NEUROLOGY

## 2022-01-19 RX ORDER — ACETAMINOPHEN 325 MG/1
650 TABLET ORAL
Status: DISCONTINUED | OUTPATIENT
Start: 2022-01-19 | End: 2022-01-28

## 2022-01-19 RX ORDER — OLANZAPINE 7.5 MG/1
15 TABLET ORAL EVERY EVENING
Status: DISCONTINUED | OUTPATIENT
Start: 2022-01-19 | End: 2022-01-20

## 2022-01-19 RX ADMIN — VENLAFAXINE HYDROCHLORIDE 150 MG: 150 CAPSULE, EXTENDED RELEASE ORAL at 08:04

## 2022-01-19 RX ADMIN — OLANZAPINE 15 MG: 7.5 TABLET, FILM COATED ORAL at 17:33

## 2022-01-19 RX ADMIN — ACETAMINOPHEN 650 MG: 325 TABLET ORAL at 15:48

## 2022-01-19 RX ADMIN — TRAZODONE HYDROCHLORIDE 100 MG: 100 TABLET ORAL at 20:23

## 2022-01-19 NOTE — H&P
[address] [facility] HISTORY AND PHYSICAL    Name:  Domitila Yoo  MR#:   [id number]  :  1995  ACCOUNT #:  [de-identified]  ADMIT DATE:  2022    IDENTIFYING DATA:  The patient is a 79-year-old, single, white male, says he is currently staying with [facility] transitional housing in [place], [place]. He is covered by [facility] Medicaid. BASIS FOR ADMISSION:  The patient is admitted after presenting to emergency room, saying that he was having worsening of auditory and visual hallucinations with thoughts to take all of his medicines. He described increased paranoia, could not say why he was having worsening of his thoughts to harm self. He has a long history of diagnosis of schizoaffective disorder or schizophrenia. He has had multiple hospitalizations including one to this facility, [address], [address] [facility].  He had just gotten out of the former hospital following an 18-day admission on 2021. He says he had been discharged being placed back on Effexor  mg daily, trazodone 100 mg at night, haloperidol 10 mg in the morning, 15 mg in the afternoon and 20 mg at night which was a huge dose. He said the Effexor was not helping with depression and so he had thoughts that he would choke himself. He does have a long history of substance abuse, had smoked marijuana but had not relapsed to his cocaine or methamphetamine use this time. MEDICAL HISTORY:  He denied significant medical complaints other than having a headache. ALLERGIES:  HE DENIED FOOD OR DRUG ALLERGIES. SUBSTANCE USE HISTORY:  He does smoke marijuana routinely. As noted above, he also has the history of cocaine and methamphetamine use on a regular basis, though had not used just before he came to the hospital.  He smokes one pack of cigarettes a day, did not want nicotine patch or gum. He does not drink.     FAMILY AND SOCIAL HISTORY:  He denied a family history of psychiatric illness. He does say that currently mother lives in [place] and has Ella right now. He does not date, never , does not have children. He did not get the COVID vaccine, saying he did not believe in it. He denied legal problems. He is a high school graduate. He does say he has a therapist Rajeev Hernandez at the transitional housing with [facility]. MENTAL STATUS EXAMINATION:  Revealed the patient to be an alert, strange white male who is rolling his hair in his fingers. Eye contact was poor. Mood was unhappy with a strange blunted affect. Thought processing was simplistic and concrete but goal-directed. He endorsed auditory hallucinations of a negative and derogatory nature. He endorsed paranoid ideas. He did not appear to be actively responding to internal stimuli. IQ was estimated in the borderline intellectual disorder, disabled range. He endorsed the suicidal ideas with voices telling him to harm himself and denied homicidal ideas. Insight and judgment were influenced by his chronic mental illness and his history of substance use. ASSESSMENT:  AXIS I:  Schizoaffective disorder, depressed type. Cannabis use disorder, severe. Cocaine use disorder, moderate. Amphetamine use disorder, moderate. Nicotine use disorder, moderate. AXIS II:  Borderline intellectual function. AXIS III:  None. TREATMENT PLAN:  This patient is admitted voluntarily after presenting to emergency room, saying he had suicidal ideas. This is of longstanding nature. He had just gotten out of 18 days in psychiatric hospital 2 weeks ago. He did say that he was still feeling depressed at that point. He was placed on a large dose of haloperidol which still did not work. He does not like the haloperidol. We will place him on Zyprexa which he says he has taken before. We will initiate 15 mg at bedtime.   We will place him on the Effexor  mg daily as well as the trazodone at bedtime. We will continue individual, group, and milieu therapies, art and recreation therapy, social work services. We will need to be in contact with [facility]. ESTIMATED LENGTH OF STAY:  7-10 days. ANTICIPATED DISPOSITION:  Follow up [facility]. PROGNOSIS:  Guarded. He has a chronic long-term history of multiple hospitalizations, substance use and poor compliance.       Bahman Isaac MD      [id number]  D:  01/19/2022 12:14  T:  01/19/2022 15:08  JOB #:  [id number]

## 2022-01-19 NOTE — BH NOTES
Presented as flat and anxious. Reported that he is \"not doing good. I am hearing voiced. I got Haldol a little while ago and it is working. \"  PRN Vistaril provided for signs of anxiety. Will continue to monitor and support as needed.

## 2022-01-19 NOTE — PROGRESS NOTES
Problem: Suicide  Goal: *STG: Remains safe in hospital  Description: Pt. will be monitored every 15 minutes for safety, free from self harm, harming others and falls. Outcome: Progressing Towards Goal  Goal: *STG/LTG: Complies with medication therapy  Description: Pt. will take his scheduled daily medications . Outcome: Progressing Towards Goal     Problem: Suicide  Goal: *STG: Attends activities and groups  Description: Pt. will attend and participate to his daily activities and groups at least 2 x day. Outcome: Not Progressing Towards Goal     Roberto Hammer is meal and med compliant. He is free from falls and harm. He has not attended groups, has been withdrawn to room. Has received PRN Tylenol for headache today. Will continue to provide support as needed.

## 2022-01-19 NOTE — H&P
Psychiatry History and Physical    Subjective:     Date of Evaluation:  1/19/2022    Reason for Referral:  Raul Cedeno was referred to the examiners from  ED for SI. History of Presenting Problem: Raul Cedeno is a 32 yo M with PMH substance abuse, Schizoaffective disorder, ADHD, Asperger's, schizophrenia who was admitted for SI and hallucinations. He denies HI. Tox screen positive for THC. EtOH and Rapid COVID both negative. He reports chronic back pain but no new complaints today. Patient Active Problem List    Diagnosis Date Noted    Polysubstance abuse ([address] 01/19/2022    Cannabis use disorder, moderate, dependence ([address] 05/12/2021    Cocaine use disorder, moderate, dependence ([address] 05/12/2021    Schizoaffective disorder, depressive type ([address] 04/01/2018    Cigarette nicotine dependence without complication 47/63/7865     Past Medical History:   Diagnosis Date    ADHD     Anxiety     Asperger's disorder     Bipolar 1 disorder ([address]     Depression     Intermittent explosive disorder     Psychiatric disorder     schitzophrenic    Schizophrenia, schizo-affective ([address]      Past Surgical History:   Procedure Laterality Date    HX TONSIL AND ADENOIDECTOMY         Family History   Problem Relation Age of Onset    Diabetes Mother     Sleep Apnea Mother       Social History     Tobacco Use    Smoking status: Current Every Day Smoker     Packs/day: 1.00     Years: 10.00     Pack years: 10.00    Smokeless tobacco: Never Used   Substance Use Topics    Alcohol use: No     Prior to Admission medications    Medication Sig Start Date End Date Taking? Authorizing Provider   traZODone (DESYREL) 100 mg tablet TAKE 1 TABLET BY MOUTH AT BEDTIME  Indications: insomnia associated with depression 5/17/21   Ja Jolly MD   ARIPiprazole (ABILIFY) 30 mg tablet Take 1 Tab by mouth daily.  Indications: schizophrenia 5/18/21   Leslee Hooker MD   venlafaxine-SR [facility] 150 mg capsule Take 1 Cap by mouth daily (with breakfast). Indications: major depressive disorder 5/18/21   Jazlyn Pittman MD     No Known Allergies     Review of Systems - History obtained from chart review and the patient  General ROS: negative  Psychological ROS: positive for - depression, hallucinations and suicidal ideation  Ophthalmic ROS: negative  ENT ROS: negative  Respiratory ROS: negative  Cardiovascular ROS: negative  Gastrointestinal ROS: negative  Musculoskeletal ROS: positive for - muscle pain  Neurological ROS: negative  Dermatological ROS: negative      Objective:     No data found. Mental Status exam: WNL except for    Sensorium  oriented to time, place and person   Orientation situation   Relations cooperative   Eye Contact appropriate   Appearance:  age appropriate   Motor Behavior:  Hyperactive    Speech:  pressured   Vocabulary average   Thought Process: goal directed   Thought Content hallucinations   Suicidal ideations intention and no plan    Homicidal ideations no plan  and no intention   Mood:  stable   Affect:  stable   Memory recent  adequate   Memory remote:  adequate   Concentration:  adequate   Abstraction:  concrete   Insight:  fair   Reliability fair   Judgment:  fair         Physical Exam:   Visit Vitals  /82   Pulse 98   Temp 97.6 °F (36.4 °C)   Resp 17   Ht 5' 11\" (1.803 m)   Wt 95.7 kg (211 lb)   SpO2 98%   BMI 29.43 kg/m²     General appearance: alert, cooperative, no distress, appears stated age  Head: Normocephalic, without obvious abnormality, atraumatic  Eyes: negative  Ears: normal TM's and external ear canals AU  Nose: Nares normal. Septum midline. Mucosa normal. No drainage or sinus tenderness. Throat: Lips, mucosa, and tongue normal. Teeth and gums normal  Neck: supple, symmetrical, trachea midline and no adenopathy  Lungs: clear to auscultation bilaterally  Heart: regular rate and rhythm, S1, S2 normal, no murmur, click, rub or gallop  Abdomen: soft, non-tender.    Extremities: extremities normal, atraumatic, no cyanosis or edema  Skin: Skin color, texture, turgor normal. No rashes or lesions  Neurologic: Grossly normal        Impression:      Principal Problem:    Schizoaffective disorder, depressive type ([facility] (4/1/2018)    Active Problems:    Cigarette nicotine dependence without complication (60/0/0609)      Polysubstance abuse ([facility] (1/19/2022)          Plan:     Recommendations for Treatment/Conditions:  Psychiatric treatment recommended while in hospital  Admit to inpatient psych for SI/hallucinations     Referral To:    Inpatient psychiatric care        [place], [place]   1/19/2022 4:51 PM

## 2022-01-19 NOTE — BH NOTES
Pt presented as dull. Pt has been isolated to his room for majority of the shift (7am-3pm). Pt only came out for breakfast. Pt did not attend nor participate in groups. Will continue to monitor.

## 2022-01-19 NOTE — BSMART NOTE
ART THERAPY GROUP PROGRESS NOTE    Group time:[id number]    The patient did not awaken/get up when called for group.

## 2022-01-20 PROCEDURE — 74011250637 HC RX REV CODE- 250/637: Performed by: PSYCHIATRY & NEUROLOGY

## 2022-01-20 PROCEDURE — 65220000003 HC RM SEMIPRIVATE PSYCH

## 2022-01-20 PROCEDURE — 99231 SBSQ HOSP IP/OBS SF/LOW 25: CPT | Performed by: PSYCHIATRY & NEUROLOGY

## 2022-01-20 RX ORDER — OLANZAPINE 10 MG/1
20 TABLET ORAL EVERY EVENING
Status: DISCONTINUED | OUTPATIENT
Start: 2022-01-20 | End: 2022-01-31 | Stop reason: HOSPADM

## 2022-01-20 RX ADMIN — HYDROXYZINE PAMOATE 50 MG: 50 CAPSULE ORAL at 16:40

## 2022-01-20 RX ADMIN — HALOPERIDOL 5 MG: 5 TABLET ORAL at 16:40

## 2022-01-20 RX ADMIN — VENLAFAXINE HYDROCHLORIDE 225 MG: 150 CAPSULE, EXTENDED RELEASE ORAL at 08:04

## 2022-01-20 RX ADMIN — TRAZODONE HYDROCHLORIDE 150 MG: 50 TABLET ORAL at 20:06

## 2022-01-20 RX ADMIN — ACETAMINOPHEN 650 MG: 325 TABLET ORAL at 08:04

## 2022-01-20 RX ADMIN — OLANZAPINE 20 MG: 10 TABLET, FILM COATED ORAL at 17:39

## 2022-01-20 RX ADMIN — HALOPERIDOL 5 MG: 5 TABLET ORAL at 10:37
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
## 2022-01-20 NOTE — BSMART NOTE
ART THERAPY GROUP PROGRESS NOTE    PATIENT SCHEDULED FOR GROUP AT: 760    ATTENDANCE: Full    PARTICIPATION LEVEL: Participates fully in the art process    ATTENTION LEVEL :Able to focus on task    FOCUS: Mindfulness    SYMBOLIC & THEMATIC CONTENT AS NOTED IN IMAGERY: He was calm the majority of group, alert, and invested in the task at hand. He actively participated in both task directive and group discussion. He shared he purposely chose specific colors because focusing on said colors \"helps with the voices. \" He described that he goes through cycles where he doesn't hear voices and then \"they start up again,\" in which focusing on said colors help revert his focus and cease voices. Later in group he became visibly upset and anxious and began rocking with his hands over his ears in response to a very loud and disruptive peer. He told this writer he was \"autistic and doesn't like loud noises. \" He was able to process appropriately with this writer. Said disruptive peer appeared to be focused on him and attempted to approach him once more, however he removed himself from said disruptive peer and went to his room as a means to collect himself. He was offered validation and acknowledgment for removing himself and encouraged to seek staff if/when he feels overwhelmed or needs assistance.

## 2022-01-20 NOTE — BSMART NOTE
History of Presenting Problem: Yonatan Martin is a 30 yo M with PMH substance abuse, Schizoaffective disorder, ADHD, Asperger's, schizophrenia who was admitted for SI and hallucinations. He denies HI. Tox screen positive for THC. EtOH and Rapid COVID both negative. He reports chronic back pain but no new complaints today. SW made contact with patient however, he continues to remain isolated and anxious. Patient is praised for attending group therapy. Patient reports he is afraid of the voices he hears. He reports he does not know what they sound like and is unsure of what they are saying. Patient reports he has services with [facility] and his  is Prasad Dow. SW will contact agency to address concerns as well as discharge plans.     MELISA Fletcher

## 2022-01-20 NOTE — BH NOTES
Patient has been isolative to room this shift, except during dinner meal. Patient voiced effectiveness of PRN medications when reassessed. Patient reminded \"our number one priority is your safety, everyone's safety. We're not going to let anything happen to you. \"  Patient voiced understanding but continues to appear fearful of female peer. Patient compliant with unit guidelines, free from falls and harm toward self and/or others. Will continue to monitor and provide interventions as appropriate.

## 2022-01-20 NOTE — PROGRESS NOTES
Behavioral Health Progress Note    Admit Date: 1/18/2022  Hospital day 2    Vitals : Patient Vitals for the past 8 hrs:   BP Temp Pulse Resp SpO2   01/20/22 0840 122/82 (!) 96.4 °F (35.8 °C) 83 18 100 %     Labs:  No results found for this or any previous visit (from the past 24 hour(s)).   Meds:   Current Facility-Administered Medications   Medication Dose Route Frequency    venlafaxine-SR (EFFEXOR-XR) capsule 225 mg  225 mg Oral DAILY    OLANZapine (ZyPREXA) tablet 15 mg  15 mg Oral QPM    acetaminophen (TYLENOL) tablet 650 mg  650 mg Oral Q6H PRN    hydrOXYzine pamoate (VISTARIL) capsule 50 mg  50 mg Oral TID PRN    haloperidoL (HALDOL) tablet 5 mg  5 mg Oral Q6H PRN    haloperidol lactate (HALDOL) injection 5 mg  5 mg IntraMUSCular Q6H PRN    traZODone (DESYREL) tablet 100 mg  100 mg Oral QHS    benztropine (COGENTIN) tablet 1 mg  1 mg Oral Q6H PRN    benztropine (COGENTIN) injection 1 mg  1 mg IntraVENous Q6H PRN      Hospital Problems: Principal Problem:    Schizoaffective disorder, depressive type ([facility] (4/1/2018)    Active Problems:    Cigarette nicotine dependence without complication (29/0/0479)      Polysubstance abuse ([facility] (1/19/2022)        Subjective:   Medication side effects: none  none    Mental Status Exam  Sensorium: alert  Orientation: only aware of  person  Relations: guarded, unreliable and vague  Eye Contact: poor  Appearance: is unkempt and is tense  Thought Process: slow rate of thoughts and poor abstract reasoning/computation   Thought Content: delusions, paranoia and hallucinations   Suicidal: auditory hallucinations   Homicidal: none   Mood: is depressed and is expansive   Affect: constricted, strange  Memory: is impaired and is remote     Concentration: distractable  Abstraction: concrete  Insight: The patient shows little insight    OR Poor  Judgement: is psychologically impaired OR  Poor    Assessment/Plan:   not changed    Continue close observation patient complains of having voices that are suicidal.  Nurses said that he told them they said kill himself but when I talked to him he said he cannot tell what they are saying just that they are extremely loud and yelling at him so he cannot calm down. He described paranoid ideas. He denied referential ideas. He denied constipation or dizziness. Female peer on the unit was extremely loud and yelling and he was constantly putting his hands over his ears dropping his head and rocking back and forth. He had received Haldol as needed this morning for agitation. He says that he cannot sleep. Previously had been taking trazodone up to 200 mg at bedtime he received 100 mg last night. He says he does not think he received any at all. We will increase the trazodone to 150 mg. He will receive higher dosing of the olanzapine 20 mg at bedtime for psychosis. Continue supportive care and reality orientation.

## 2022-01-20 NOTE — BSMART NOTE
ART THERAPY GROUP PROGRESS NOTE    Group time:8844    The patient declined group despite encouragement

## 2022-01-20 NOTE — PROGRESS NOTES
Problem: Suicide  Goal: *STG: Attends activities and groups  Description: Pt. will attend and participate to his daily activities and groups at least 2 x day. Outcome: Progressing Towards Goal     Problem: Anxiety-Behavioral Health (Adult/Pediatric)  Goal: *STG: Attends activities and groups  Description: Pt will attend 3 out of 5 groups daily. Outcome: Progressing Towards Goal  Note: Decrease anxiety and stress     Problem: Depressed Mood (Adult/Pediatric)  Goal: *STG: Remains safe in hospital  Description: Pt will be free of SI, intent, or plan as well as self-injurious behaviors every shift. Outcome: Progressing Towards Goal  Note: Encourage patient he is safe on the unit. Patient was in group therapy when he begin to hear voice. Patient stated that he hear voices and was anxious when another patient interrupted group. Patient covered his ears and removed himself from group, upset. Patient stated that he has chronic back pain. Patient was medicated and tech sit with patient until he felt comfortable. Patient was ensured that he was safe on the unit. Patient instructed to using group therapy to help him relax and to seek staff help to assist him in relaxing.

## 2022-01-20 NOTE — BH NOTES
Patient received PRN Haldol 5mg tab PO for complaint of auditory hallucinations with increasing agitation due to female peer on unit continually disrupting group. Patient excused self and went to room for several minutes and commended for removing self from distressing situation. Will monitor for effectiveness.

## 2022-01-20 NOTE — BSMART NOTE
Social Work Group Progress Note    Time: 1200    Attendance:  DId not attend     Participation Level: did not attend     Observation: Did not attend

## 2022-01-20 NOTE — BH NOTES
Patient received PRN Vistaril cap PO and PRN Haldol tab PO for complaint of anxiety increasing into agitation. Patient voiced female peer causing increase in auditory hallucinations which is causing distress. Will monitor for effectiveness.

## 2022-01-21 PROCEDURE — 65220000003 HC RM SEMIPRIVATE PSYCH

## 2022-01-21 PROCEDURE — 74011250637 HC RX REV CODE- 250/637: Performed by: PSYCHIATRY & NEUROLOGY

## 2022-01-21 PROCEDURE — 99231 SBSQ HOSP IP/OBS SF/LOW 25: CPT | Performed by: PSYCHIATRY & NEUROLOGY

## 2022-01-21 RX ORDER — TRAZODONE HYDROCHLORIDE 100 MG/1
200 TABLET ORAL
Status: DISCONTINUED | OUTPATIENT
Start: 2022-01-21 | End: 2022-01-31 | Stop reason: HOSPADM

## 2022-01-21 RX ADMIN — ACETAMINOPHEN 650 MG: 325 TABLET ORAL at 13:55

## 2022-01-21 RX ADMIN — OLANZAPINE 20 MG: 10 TABLET, FILM COATED ORAL at 17:44

## 2022-01-21 RX ADMIN — HALOPERIDOL 5 MG: 5 TABLET ORAL at 13:55

## 2022-01-21 RX ADMIN — VENLAFAXINE HYDROCHLORIDE 225 MG: 150 CAPSULE, EXTENDED RELEASE ORAL at 08:40

## 2022-01-21 RX ADMIN — TRAZODONE HYDROCHLORIDE 200 MG: 100 TABLET ORAL at 20:05

## 2022-01-21 RX ADMIN — HYDROXYZINE PAMOATE 50 MG: 50 CAPSULE ORAL at 08:40

## 2022-01-21 NOTE — BH NOTES
Patient given PRN Vistaril per request for increasing anxiety due to peer in dayroom requiring CONSTANT redirection from arguing with peers and staff. Patient reassured \"your safety is our top priority. \"

## 2022-01-21 NOTE — BSMART NOTE
ART THERAPY GROUP PROGRESS NOTE    Group time: 10:15    The patient reportedly expressed no interest in group despite encouragement.

## 2022-01-21 NOTE — PROGRESS NOTES
Problem: Suicide  Goal: *STG: Remains safe in hospital  Description: Pt. will be monitored every 15 minutes for safety, free from self harm, harming others and falls. Outcome: Progressing Towards Goal  Goal: *STG/LTG: Complies with medication therapy  Description: Pt. will take his scheduled daily medications . Outcome: Progressing Towards Goal     Problem: Suicide  Goal: *STG: Attends activities and groups  Description: Pt. will attend and participate to his daily activities and groups at least 2 x day. Outcome: Not Progressing Towards Goal     Augusto Monahan is meal and med compliant. He has been more withdrawn today and has not attended groups. He perhaps is fearful of a disruptive peer. He his free from falls and harm. Will continue to provide support as needed.

## 2022-01-21 NOTE — BSMART NOTE
Social Work Group Progress Note    Time: 2246    Attendance: did not attend     Participation Level: Did not attend     Observation: Did not attend

## 2022-01-21 NOTE — BSMART NOTE
SOCIAL WORK GROUP THERAPY PROGRESS NOTE    Group Time:  10:15am    Group Topic:  Coping Skills    Group Participation:     Pt expressed not interested in attending session despite staff support. Still upset female peer disrespecting him & others & doesn't want to deal with it.

## 2022-01-21 NOTE — GROUP NOTE
[facility]  GROUP DOCUMENTATION INDIVIDUAL                                                                          Group Therapy Note    Date: 1/21/2022    Group Start Time: 8987  Group End Time: 1430  Group Topic: Reflection/Relaxation    [facility] 1 ADULT CHEM [address], RN    IP [address] [facility] GROUP DOCUMENTATION GROUP    Group Therapy Note    Attendees:6         Attendance: Did not attend    Bianka Garcia RN

## 2022-01-21 NOTE — BSMART NOTE
History of Presenting Problem: Philip Valles is a 30 yo M with PMH substance abuse, Schizoaffective disorder, ADHD, Asperger's, schizophrenia who was admitted for SI and hallucinations. He denies HI. Tox screen positive for THC. EtOH and Rapid COVID both negative. He reports chronic back pain but no new complaints today.     SW made contact with patient as he engaged with peers in the milieu. Patient reports he is feeling better however, continues to reports he is afraid. SW informed patient of his safety. SW allowed patient to process thoughts and feelings. He reports the voices he hears are still present. He reports he has services with [facility]. SW attempted contact with  and left message to address discharge plan. SW will continue with supportive counseling and will assist as needed.     Ulices Balderas LCSW-E

## 2022-01-21 NOTE — BSMART NOTE
ART THERAPY GROUP PROGRESS NOTE    Group time: 13:15    The patient reportedly expressed not interested in group despite encouragement.

## 2022-01-21 NOTE — BH NOTES
Patient accepting meals. Patient isolative to room. Patient not attending groups. Patient resting in bed, chest rising and falling.

## 2022-01-21 NOTE — GROUP NOTE
[facility] GROUP DOCUMENTATION INDIVIDUAL                                                                          Group Therapy Note    Date: 1/20/2022    Group Start Time: 2000  Group End Time: 2015  Group Topic: Medication    [facility] 1 ADULT CHEM DEP    Evangelist Estes RN    IP [address] [facility] GROUP DOCUMENTATION GROUP    Group Therapy Note    Attendees: 3         Attendance: Attended    Patient's Goal:  Understanding the importance of medication compliance. Interventions/techniques: Informed    Follows Directions: Followed directions    Interactions: Interacted appropriately    Mental Status: Anxious    Behavior/appearance: Cooperative    Goals Achieved:  Identified feelings      Yun Gaming RN

## 2022-01-21 NOTE — PROGRESS NOTES
Behavioral Health Progress Note    Admit Date: 1/18/2022  Hospital day 3    Vitals : Patient Vitals for the past 8 hrs:   BP Temp Pulse Resp   01/21/22 0834 116/76 (!) 96.6 °F (35.9 °C) 88 17     Labs:  No results found for this or any previous visit (from the past 24 hour(s)).   Meds:   Current Facility-Administered Medications   Medication Dose Route Frequency    traZODone (DESYREL) tablet 200 mg  200 mg Oral QHS    OLANZapine (ZyPREXA) tablet 20 mg  20 mg Oral QPM    venlafaxine-SR (EFFEXOR-XR) capsule 225 mg  225 mg Oral DAILY    acetaminophen (TYLENOL) tablet 650 mg  650 mg Oral Q6H PRN    hydrOXYzine pamoate (VISTARIL) capsule 50 mg  50 mg Oral TID PRN    haloperidoL (HALDOL) tablet 5 mg  5 mg Oral Q6H PRN    haloperidol lactate (HALDOL) injection 5 mg  5 mg IntraMUSCular Q6H PRN    benztropine (COGENTIN) tablet 1 mg  1 mg Oral Q6H PRN    benztropine (COGENTIN) injection 1 mg  1 mg IntraVENous Q6H PRN      Hospital Problems: Principal Problem:    Schizoaffective disorder, depressive type ([facility] (4/1/2018)    Active Problems:    Cigarette nicotine dependence without complication (34/5/0404)      Polysubstance abuse ([facility] (1/19/2022)        Subjective:   Medication side effects: none  none    Mental Status Exam  Sensorium: alert  Orientation: only aware of  place and person  Relations: passive and vague  Eye Contact: poor  Appearance: is unkempt and is bizarre  Thought Process: slow rate of thoughts and poor abstract reasoning/computation   Thought Content: delusions, paranoia and hallucinations   Suicidal: voices say to kill self   Homicidal: none   Mood: is anxious, is withdrawn and unhappy   Affect: strange constricted  Memory: is impaired and is remote     Concentration: distractable  Abstraction: concrete  Insight: The patient shows little insight    OR Poor  Judgement: is psychologically impaired OR  Poor    Assessment/Plan:   not changed    Continue close observation  Nurses report that he keeps to himself and stays in his room most of the time. He does say that the trazodone dosing is not sufficient he still cannot sleep. He previously had been on 200 mg we will go back up to the 200 mg at bedtime. He says he is not groggy during the day by taking any of his medicines. He is still hearing voices in his head that are yelling at him but not quite as loud. He is still having complaint of them talking of suicide. He continues to feel dysphoric. Continue with supportive care reality orientation and antipsychotic medication management.

## 2022-01-22 PROCEDURE — 65220000003 HC RM SEMIPRIVATE PSYCH

## 2022-01-22 PROCEDURE — 74011250637 HC RX REV CODE- 250/637: Performed by: PSYCHIATRY & NEUROLOGY

## 2022-01-22 PROCEDURE — 99231 SBSQ HOSP IP/OBS SF/LOW 25: CPT | Performed by: PSYCHIATRY & NEUROLOGY

## 2022-01-22 RX ADMIN — ACETAMINOPHEN 650 MG: 325 TABLET ORAL at 10:00

## 2022-01-22 RX ADMIN — OLANZAPINE 20 MG: 10 TABLET, FILM COATED ORAL at 17:21

## 2022-01-22 RX ADMIN — HYDROXYZINE PAMOATE 50 MG: 50 CAPSULE ORAL at 17:21

## 2022-01-22 RX ADMIN — TRAZODONE HYDROCHLORIDE 200 MG: 100 TABLET ORAL at 20:11

## 2022-01-22 RX ADMIN — VENLAFAXINE HYDROCHLORIDE 225 MG: 150 CAPSULE, EXTENDED RELEASE ORAL at 08:08

## 2022-01-22 RX ADMIN — HALOPERIDOL 5 MG: 5 TABLET ORAL at 13:33

## 2022-01-22 NOTE — PROGRESS NOTES
Problem: Suicide  Goal: *STG: Remains safe in hospital  Description: Pt. will be monitored every 15 minutes for safety, free from self harm, harming others and falls. Outcome: Progressing Towards Goal  Goal: *STG: Attends activities and groups  Description: Pt. will attend and participate to his daily activities and groups at least 2 x day. Outcome: Progressing Towards Goal  Goal: *STG/LTG: Complies with medication therapy  Description: Pt. will take his scheduled daily medications . Outcome: Progressing Towards Goal     Problem: Anxiety-Behavioral Health (Adult/Pediatric)  Goal: *STG: Attends activities and groups  Description: Pt will attend 3 out of 5 groups daily. Outcome: Progressing Towards Goal  Patient states he is doing better. Medication compliant. Attending groups. Social with select peers. Voiced no complaints.

## 2022-01-22 NOTE — PROGRESS NOTES
[address] [facility] [address]  Inpatient Progress Note     Date of Service: 01/22/22  Hospital Day: 4     Subjective/Interval History   01/22/22    Treatment Team Notes:  Notes reviewed and/or discussed and report that Hosea Burrell is a patient with a history of affective disorder depressive type, attention invited to the admission note which is oscillatory. Patient interview: Hosea Burrell was interviewed by this writer today. The patient continues to describe the presence of auditory hallucinations, which are ego-dystonic. He is currently being prescribed olanzapine 20 mg during the evening, the same as venlafaxine extended release 225 mg every morning. Tolerance to this combination of medication is appropriate. For now we will maintain the same, however he may require an increase of his olanzapine dose if further improvement is not present. Objective     Visit Vitals  /86   Pulse 92   Temp 97.9 °F (36.6 °C)   Resp 17   Ht 5' 11\" (1.803 m)   Wt 95.7 kg (211 lb)   SpO2 100%   BMI 29.43 kg/m²     Vitals are stable    No results found for this or any previous visit (from the past 24 hour(s)). Mental Status Examination     Appearance/Hygiene 31 y.o. WHITE/NON- male  Hygiene: Limited   Behavior/Social Relatedness  withdrawn at times   Musculoskeletal Gait/Station: appropriate  Tone (flaccid, cogwheeling, spastic): not assessed  Psychomotor (hyperkinetic, hypokinetic): calm   Involuntary movements (tics, dyskinesias, akathisa, stereotypies): none   Speech   Rate, rhythm, volume, fluency and articulation are appropriate   Mood   depressed   Affect    labile at times   Thought Process Linear and goal directed   Thought Content and Perceptual Disturbances  suicidal thoughts are still present, however he is able to CFS while in the hospital  Auditory hallucinations are still present, they are improving very slowly.    Sensorium and Cognition  Grossly intact, however remote memory is described as impaired. Insight  Limited   Judgment  poor by history        Assessment/Plan      Psychiatric Diagnoses:   Patient Active Problem List   Diagnosis Code    Schizoaffective disorder, depressive type ([facility] F25.1    Cigarette nicotine dependence without complication L73.041    Cannabis use disorder, moderate, dependence ([facility] F12.20    Cocaine use disorder, moderate, dependence ([facility] F14.20    Polysubstance abuse ([facility] F19.10       Medical Diagnoses: Per attending physician    Psychosocial and contextual factors: Same    Level of impairment/disability: Severe by history    1. we will maintain the same combination of medications for now. There is no evidence of medications related side effects. Olanzapine dose may have to be increased. 2.  Reviewed instructions, risks, benefits and side effects of medications  3.   Disposition/Discharge Date: self-care/home, to be determined    Karen Brothers MD, [address] [place] [address]  Psychiatry

## 2022-01-22 NOTE — BH NOTES
Pt presents with dull affect, anxious  mood. Pt states, \"My voices are bothering me, and I feel really anxious. I don't know why. \" Pt received PRN Vistaril. Pt has been watching television in the day area for much of the evening. Pt denies SI/HI at this time. Pt is medication compliant. Will continue to monitor.

## 2022-01-22 NOTE — BH NOTES
Pt c/o dizziness. /72 . Pt encouraged to increase fluid intake, and change positions slowly. Will continue to monitor.

## 2022-01-23 PROCEDURE — 74011250637 HC RX REV CODE- 250/637: Performed by: PSYCHIATRY & NEUROLOGY

## 2022-01-23 PROCEDURE — 99231 SBSQ HOSP IP/OBS SF/LOW 25: CPT | Performed by: PSYCHIATRY & NEUROLOGY

## 2022-01-23 PROCEDURE — 65220000003 HC RM SEMIPRIVATE PSYCH

## 2022-01-23 RX ADMIN — OLANZAPINE 20 MG: 10 TABLET, FILM COATED ORAL at 17:07

## 2022-01-23 RX ADMIN — HYDROXYZINE PAMOATE 50 MG: 50 CAPSULE ORAL at 20:36

## 2022-01-23 RX ADMIN — ACETAMINOPHEN 650 MG: 325 TABLET ORAL at 10:30

## 2022-01-23 RX ADMIN — VENLAFAXINE HYDROCHLORIDE 225 MG: 150 CAPSULE, EXTENDED RELEASE ORAL at 08:16

## 2022-01-23 RX ADMIN — ACETAMINOPHEN 650 MG: 325 TABLET ORAL at 18:27

## 2022-01-23 RX ADMIN — TRAZODONE HYDROCHLORIDE 200 MG: 100 TABLET ORAL at 20:36

## 2022-01-23 NOTE — BH NOTES
Pt presents with dull affect, euthymic mood, preoccupied thought process. Pt endorses auditory hallucinations stating, \"My voices are bothering me a lot today. \" Pt has been social with his peers, watching television in the day area for much of the evening. Pt denies SI/HI at this time. Will continue to monitor.

## 2022-01-23 NOTE — PROGRESS NOTES
Problem: Suicide  Goal: *STG: Remains safe in hospital  Description: Pt. will be monitored every 15 minutes for safety, free from self harm, harming others and falls. Outcome: Progressing Towards Goal  Goal: *STG: Attends activities and groups  Description: Pt. will attend and participate to his daily activities and groups at least 2 x day. Outcome: Progressing Towards Goal  Goal: *STG/LTG: Complies with medication therapy  Description: Pt. will take his scheduled daily medications . Outcome: Progressing Towards Goal     Problem: Altered Thought Process (Adult/Pediatric)  Goal: *STG: Decreased delusional thinking  Description: Pt will be free of delusional thinking daily. Outcome: Progressing Towards Goal   Patient is pleasant on approach. Affect is flat, mood is calm. Continue to have auditory hallucinations , he will ask for prn haldol for this. Will continue to monitor.

## 2022-01-23 NOTE — PROGRESS NOTES
[address]  Inpatient Progress Note     Date of Service: 01/23/22  Hospital Day: 5     Subjective/Interval History   01/23/22    Treatment Team Notes:  Notes reviewed and/or discussed and report that Sandra Queen is the case was discussed with nursing staff this morning, and the patient was seen during psych rounds today. Patient interview: Sandra Queen was interviewed by this writer today. Treatment compliant, he described during the session today an improvement in his auditory hallucinations. There is no evidence of medications related side effects, so we will continue the same. Objective     Visit Vitals  /86   Pulse 99   Temp 97.6 °F (36.4 °C)   Resp 17   Ht 5' 11\" (1.803 m)   Wt 95.7 kg (211 lb)   SpO2 100%   BMI 29.43 kg/m²     Vitals are stable. Fair    No results found for this or any previous visit (from the past 24 hour(s)). Mental Status Examination     Appearance/Hygiene 31 y.o.  WHITE/NON- male  Hygiene:    Behavior/Social Relatedness Appropriate, less withdrawn, less agitated   Musculoskeletal Gait/Station: appropriate  Tone (flaccid, cogwheeling, spastic): not assessed  Psychomotor (hyperkinetic, hypokinetic): calm   Involuntary movements (tics, dyskinesias, akathisa, stereotypies): none   Speech   Rate, rhythm, volume, fluency and articulation are appropriate   Mood   depression is improving   Affect    lability has improved   Thought Process Linear and goal directed   Thought Content and Perceptual Disturbances Denies self-injurious behavior (SIB), suicidal ideation (SI), aggressive behavior or homicidal ideation (HI)  Denies auditory and visual hallucinations   Sensorium and Cognition  grossly intact, remote memory described as impaired   Insight  improving   Judgment  poor by history, however improving        Assessment/Plan      Psychiatric Diagnoses:   Patient Active Problem List   Diagnosis Code    Schizoaffective disorder, depressive type ([facility] F25.1    Cigarette nicotine dependence without complication O90.637    Cannabis use disorder, moderate, dependence ([facility]) F12.20    Cocaine use disorder, moderate, dependence ([facility]) F14.20    Polysubstance abuse ([facility] F19.10       Medical Diagnoses: Per attending physician    Psychosocial and contextual factors: Same    Level of impairment/disability: Severe by history    1. positive treatment response described the patient. No evidence of medications related side effects noted. For now we will maintain the same doses of olanzapine and venlafaxine extended release. 2.  Reviewed instructions, risks, benefits and side effects of medications  3.   Disposition/Discharge Date: self-care/home, TBD    Henrik Ma MD, [address] [place] [address]  Psychiatry

## 2022-01-23 NOTE — GROUP NOTE
IP  GROUP DOCUMENTATION INDIVIDUAL                                                                          Group Therapy Note    Date: 1/22/2022    Group Start Time: 2000  Group End Time: 2030  Group Topic: Medication    [facility] 1 ADULT CHEM DEP    Julio Pittman, [place]    IP [address] [facility] GROUP DOCUMENTATION GROUP    Group Therapy Note    Attendees: 7         Attendance: Did not attend    Patient's Goal:  Understanding medication being provided as scheduled and as needed. Additional Notes:  Pt has been quietly resting in bed with eyes closed, but anxious and flat when while awake. Alert and oriented x 4. Remains compliant with medication. Will continue to monitor and support as needed.      Martha Winston RN

## 2022-01-24 PROCEDURE — 99231 SBSQ HOSP IP/OBS SF/LOW 25: CPT | Performed by: PSYCHIATRY & NEUROLOGY

## 2022-01-24 PROCEDURE — 74011250637 HC RX REV CODE- 250/637: Performed by: PSYCHIATRY & NEUROLOGY

## 2022-01-24 PROCEDURE — 65220000003 HC RM SEMIPRIVATE PSYCH

## 2022-01-24 RX ORDER — OLANZAPINE 5 MG/1
5 TABLET ORAL DAILY
Status: DISCONTINUED | OUTPATIENT
Start: 2022-01-25 | End: 2022-01-31 | Stop reason: HOSPADM

## 2022-01-24 RX ORDER — OLANZAPINE 10 MG/1
10 TABLET ORAL DAILY
Status: DISCONTINUED | OUTPATIENT
Start: 2022-01-25 | End: 2022-01-24

## 2022-01-24 RX ADMIN — OLANZAPINE 20 MG: 10 TABLET, FILM COATED ORAL at 17:17

## 2022-01-24 RX ADMIN — ACETAMINOPHEN 650 MG: 325 TABLET ORAL at 15:21

## 2022-01-24 RX ADMIN — VENLAFAXINE HYDROCHLORIDE 225 MG: 150 CAPSULE, EXTENDED RELEASE ORAL at 08:16

## 2022-01-24 RX ADMIN — ACETAMINOPHEN 650 MG: 325 TABLET ORAL at 09:31

## 2022-01-24 RX ADMIN — TRAZODONE HYDROCHLORIDE 200 MG: 100 TABLET ORAL at 20:01

## 2022-01-24 RX ADMIN — HALOPERIDOL 5 MG: 5 TABLET ORAL at 11:11

## 2022-01-24 RX ADMIN — HYDROXYZINE PAMOATE 50 MG: 50 CAPSULE ORAL at 14:03

## 2022-01-24 NOTE — PROGRESS NOTES
Problem: Anxiety-Behavioral Health (Adult/Pediatric)  Goal: *STG: Attends activities and groups  Description: Pt will attend 3 out of 5 groups daily. Outcome: Progressing Towards Goal     Problem: Depressed Mood (Adult/Pediatric)  Goal: *STG: Complies with medication therapy  Description: Pt will take medications as prescribed daily. Outcome: Progressing Towards Goal     Patient has been in day room most of day and evening shift. Patient compliant with medications and has attended all groups, has been active participant. Patient has eaten meals and snacks and has been more interactive with staff and peers. Will continue to monitor and provide interventions as needed.

## 2022-01-24 NOTE — GROUP NOTE
IP  GROUP DOCUMENTATION INDIVIDUAL                                                                          Group Therapy Note    Date: 1/23/2022    Group Start Time: 2000  Group End Time: 2015  Group Topic: Medication    [facility] 1 ADULT CHEM DEP    Rosy Gomez RN    IP [address] GROUP DOCUMENTATION GROUP    Group Therapy Note    Attendees: 9           Attendance: Attended    Patient's Goal:  Understanding medication being provided as schedule and as needed medication. Interventions/techniques: Informed    Follows Directions: Followed directions    Interactions: Interacted appropriately    Mental Status: Anxious and Flat    Behavior/appearance: Cooperative    Goals Achieved: Able to receive feedback      Additional Notes:  Pt has been sitting quietly in day area friendly when approached and attempted to spontaneously interact with staff. Anxious and flat, but denied distress at present. Alert and oriented x 4. Pt has been compliant with medication. Will continue to monitor and support as needed. Gabriela Padgett RN

## 2022-01-24 NOTE — BSMART NOTE
ART THERAPY GROUP PROGRESS NOTE    PATIENT SCHEDULED FOR GROUP AT: 10:00    ATTENDANCE: Full    PARTICIPATION LEVEL: Participates fully in the art process    ATTENTION LEVEL: Able to focus on task    FOCUS: Mindfulness     SYMBOLIC & THEMATIC CONTENT AS NOTED IN IMAGERY: He was calm, slightly elevated, and presented with a bright affect. He was much more willing to join and was alert and invested in the task at hand compared to last week. He was talkative with the art therapist and art therapy intern and often called either over to talk during group process. He was responsive to re-direction, but needed it frequently. He whispered to the art therapist at least twice that his \"voices are really bad right now\" and that he was \"going to get something for them\" at the nurses station. He claimed that listening to instrumental music (as provided) and focusing on the art directive was \"helpful\" in regards to easing his voices. At one point he began focusing on \"not feeling safe,\" and when art therapy intern asked him to clarify, he claimed, quote: \"I don'tt feel safe right now and think I need to stay for one more day. If I leave now they will find a dead body. \" He did not clarify who \"they\" was or what/who he meant by \"dead body. \" He later claimed \"today is the two year anniversary of the death of my ex. 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 \" He claimed he wants to \"get back into it one day. \" He was offered validation and support from group members in which he appeared receptive and appreciative.

## 2022-01-24 NOTE — BSMART NOTE
Social Work Group Progress Note    Time: 1200    Attendance: Full     Participation Level: Engaged    Observation: Patient completed task at hand

## 2022-01-24 NOTE — BH NOTES
AIYANA Note: The above pt has been alert and cooperative this shift He has not been a management proble his shift. He verbally contract for safety this shift. Hien Henry

## 2022-01-24 NOTE — BSMART NOTE
History of Presenting Problem: Philip Valles is a 32 yo M with PMH substance abuse, Schizoaffective disorder, ADHD, Asperger's, schizophrenia who was admitted for SI and hallucinations. He denies HI. Tox screen positive for THC. EtOH and Rapid COVID both negative. He reports chronic back pain but no new complaints today.     ANNA assessment/Intervention:  SW made contact with patient as he engaged with peers in the milieu. Patient continues to endorse AVH. He reports he is still afraid of the voices. Patient reports he has been compliant with medications. He reports he is returning to his group home upon discharge. SW Collateral:  SW attempted contact with mother Adriane Calloway ([phone]) to address discharge plan however, patient reports she has COVID and is unavailable at this time. SW will continue with supportive counseling and will assist as needed.     Terri Hope, AGATA-E

## 2022-01-24 NOTE — PROGRESS NOTES
Behavioral Health Progress Note    Admit Date: 1/18/2022  Hospital day 6    Vitals : Patient Vitals for the past 8 hrs:   BP Temp Pulse Resp   01/24/22 0753 110/82 97.1 °F (36.2 °C) 90 17     Labs:  No results found for this or any previous visit (from the past 24 hour(s)).   Meds:   Current Facility-Administered Medications   Medication Dose Route Frequency    [START ON 1/25/2022] OLANZapine (ZyPREXA) tablet 10 mg  10 mg Oral DAILY    traZODone (DESYREL) tablet 200 mg  200 mg Oral QHS    OLANZapine (ZyPREXA) tablet 20 mg  20 mg Oral QPM    venlafaxine-SR (EFFEXOR-XR) capsule 225 mg  225 mg Oral DAILY    acetaminophen (TYLENOL) tablet 650 mg  650 mg Oral Q6H PRN    hydrOXYzine pamoate (VISTARIL) capsule 50 mg  50 mg Oral TID PRN    haloperidoL (HALDOL) tablet 5 mg  5 mg Oral Q6H PRN    haloperidol lactate (HALDOL) injection 5 mg  5 mg IntraMUSCular Q6H PRN    benztropine (COGENTIN) tablet 1 mg  1 mg Oral Q6H PRN    benztropine (COGENTIN) injection 1 mg  1 mg IntraVENous Q6H PRN      Hospital Problems: Principal Problem:    Schizoaffective disorder, depressive type ([facility] [address] (4/1/2018)    Active Problems:    Cigarette nicotine dependence without complication (47/3/4225)      Polysubstance abuse ([facility] (1/19/2022)        Subjective:   Medication side effects: none  none    Mental Status Exam  Sensorium: alert  Orientation: only aware of  time, place and person  Relations: cooperative and passive  Eye Contact: poor   Appearance: is unkempt and is bizarre  Thought Process: slow rate of thoughts and poor abstract reasoning/computation   Thought Content: paranoia and paucity of content   Suicidal: none   Homicidal: none   Mood: is anxious   Affect: odd, constricted  Memory: is impaired and is remote     Concentration: distractable  Abstraction: concrete  Insight: The patient shows little insight    OR Fair  Judgement: is psychologically impaired OR  Fair    Assessment/Plan:   not changed    Continue close observation    Patient reports that he was able to sleep at night. He is still seeing visions of people that he knows in the corner of his and then turns to look and no one is there. He continues to feel paranoid and have vague auditory hallucinations that he cannot tell what they are saying. He is unkempt. He does say that he is feeling somewhat better at this point. He is asking to try to take daytime dosing of the olanzapine because it helps him to calm down and was asking for more. We will do a further increase on the dose of olanzapine to 5 mg in the morning and 20 mg at night. This is slightly higher than the standard dosing of 20 mg. He is not having any side effects of the medication. He does say that he will need to follow-up with his therapist Deepika Lagos at [facility] who he talks to on telephone every Friday at 6 PM when he gets out of here to go stay at [facility] transitional housing. We will continue with reality orientation antipsychotic medication management and supportive care as well as the increased dose of Effexor  mg daily.

## 2022-01-24 NOTE — BH NOTES
Patient participating in the morning art therapy group. Patient stating \"I don't feel safe. \" \"I'm going to talk to my doctor about staying one more day. \" Patient becoming tearful during group. Patient able to color a mandala and listen to the music (as a coping mechanism) to process his emotions. Patient able to continue with the group activity and explain his picture to the group.

## 2022-01-24 NOTE — BH NOTES
Patient given Vistaril for anxiety per patient request will continue to follow current POC and interventions per policies/protocols.

## 2022-01-24 NOTE — BSMART NOTE
SOCIAL WORK GROUP THERAPY PROGRESS NOTE    Group Time:  10:15am to 11am    Group Topic:  Coping Skills    C D Issues    Group Participation:      Pt moderately involved during group discussion but remained attentive. Read part of handout. Less anxious. Emphasis of session was presentation of basic \"CBT\" principles including diagram of the process, as well as list of typical cognitive distortions. Taught client about relationship between mood disorders & self medicating them.

## 2022-01-24 NOTE — BH NOTES
Roque Mackay presented to nurses desk stating \"After I take Zyprexa, I have tremors at night\". States the tremors are felt, not seen. Pt encouraged to discuss with MD tomorrow.

## 2022-01-24 NOTE — BSMART NOTE
ART THERAPY GROUP PROGRESS NOTE    PATIENT SCHEDULED FOR GROUP AT: 13:30    ATTENDANCE: Full    PARTICIPATION LEVEL: Participates fully in the art process    ATTENTION LEVEL : Often needed re-direction    FOCUS: Goals    SYMBOLIC & THEMATIC CONTENT AS NOTED IN IMAGERY:  Pt had a difficult time focusing on the art directive. He frequently became tearful and anxious and needed re-direction to the art task and group discussion. He stated his goals were \"need better support, need to eat more vegetables, need to exercise, drink 4 cups of water, need to control anger, getting clean, and take naps. \" He also stated what he needs to reach his goals is \"support. \" He claimed he \"really doesn't have family or friends that are supportive. \" Group discussed different means to find an utilize support.

## 2022-01-25 PROCEDURE — 65220000003 HC RM SEMIPRIVATE PSYCH

## 2022-01-25 PROCEDURE — 99232 SBSQ HOSP IP/OBS MODERATE 35: CPT | Performed by: PSYCHIATRY & NEUROLOGY

## 2022-01-25 PROCEDURE — 74011250637 HC RX REV CODE- 250/637: Performed by: PSYCHIATRY & NEUROLOGY

## 2022-01-25 RX ORDER — BENZTROPINE MESYLATE 1 MG/1
1 TABLET ORAL 2 TIMES DAILY
Status: DISCONTINUED | OUTPATIENT
Start: 2022-01-25 | End: 2022-01-27

## 2022-01-25 RX ADMIN — BENZTROPINE MESYLATE 1 MG: 1 TABLET ORAL at 14:32

## 2022-01-25 RX ADMIN — HYDROXYZINE PAMOATE 50 MG: 50 CAPSULE ORAL at 09:04

## 2022-01-25 RX ADMIN — ACETAMINOPHEN 650 MG: 325 TABLET ORAL at 10:07

## 2022-01-25 RX ADMIN — HALOPERIDOL 5 MG: 5 TABLET ORAL at 14:33

## 2022-01-25 RX ADMIN — VENLAFAXINE HYDROCHLORIDE 225 MG: 150 CAPSULE, EXTENDED RELEASE ORAL at 08:21

## 2022-01-25 RX ADMIN — BENZTROPINE MESYLATE 1 MG: 1 TABLET ORAL at 20:06

## 2022-01-25 RX ADMIN — TRAZODONE HYDROCHLORIDE 200 MG: 100 TABLET ORAL at 20:06

## 2022-01-25 RX ADMIN — OLANZAPINE 5 MG: 5 TABLET, FILM COATED ORAL at 08:22

## 2022-01-25 RX ADMIN — OLANZAPINE 20 MG: 10 TABLET, FILM COATED ORAL at 17:09

## 2022-01-25 NOTE — BSMART NOTE
ART THERAPY GROUP PROGRESS NOTE    PATIENT SCHEDULED FOR GROUP AT: 1554    ATTENDANCE: Full    PARTICIPATION LEVEL: Participates fully in the art process    ATTENTION LEVEL: Able to focus on task    FOCUS: Support    SYMBOLIC & THEMATIC CONTENT AS NOTED IN IMAGERY: He was anxious upon arrival and had the tendency to ruminate on the negative. He needed much encouragement and would often seek reassurance. He was able to practice what we discussed regarding slowing down and focusing on the present, implementing \"peaceful thoughts\" vs fears and received positive feedback from group members.

## 2022-01-25 NOTE — BH NOTES
Pt appeared to have slept for 7+ hours thus far. He was observed making hand motions/gestures in his sleep; appeared to be dreaming at the time. Will continue to monitor for safety.

## 2022-01-25 NOTE — BSMART NOTE
History of Presenting Problem: Philip Valles is a 32 yo M with PMH substance abuse, Schizoaffective disorder, ADHD, Asperger's, schizophrenia who was admitted for SI and hallucinations. He denies HI. Tox screen positive for THC. EtOH and Rapid COVID both negative. He reports chronic back pain but no new complaints today.     SW made contact with patient as he participated in team treatment meeting. Patient is engaged and reports he is not prepared for discharge. Patient is tearful as he addressed the need for further treatment. Patient continues to endorse AVH. He reports he is beginning to sleep well. Patient addressed a possible need for a change in his medications. Team provided support and will continue to assist patient to ensure safe and effective discharge. SW encouraged patient to continue with group engagement and medication compliance. SW will continue with supportive counseling and will assist as needed.     Ferdinand Lorenzo, AGATA-E

## 2022-01-25 NOTE — PROGRESS NOTES
Treatment team met -     Medical Director: _____present   Psychiatrist: ___x__present   Charge nurse: __x___present   MSW: __x___present   : _____present   Nurse Manager: _____present   Student RNs: _____present   Medical Students: _____present   Art Therapist: _____present   Clinical Coordinator: __x___present    Occupational Therapist: _____present   : _______ present    _______ present  Crisis Supervisor_______present  Patient:__. _____ present      Plan of care discussed and updated as appropriate. Patient was present and engaged in his treatment team meeting. Patient voiced concerns about his medications which was discussed. Discharge plans and follow up care was discussed.

## 2022-01-25 NOTE — PROGRESS NOTES
Problem: Suicide  Goal: *STG: Remains safe in hospital  Description: Pt. will be monitored every 15 minutes for safety, free from self harm, harming others and falls. Outcome: Progressing Towards Goal  Goal: *STG: Attends activities and groups  Description: Pt. will attend and participate to his daily activities and groups at least 2 x day. Outcome: Progressing Towards Goal  Goal: *STG/LTG: Complies with medication therapy  Description: Pt. will take his scheduled daily medications . Outcome: Progressing Towards Goal     Problem: Altered Thought Process (Adult/Pediatric)  Goal: *STG: Decreased delusional thinking  Description: Pt will be free of delusional thinking daily. Outcome: Progressing Towards Goal  Patient states he is doing better. He is taking his medications, attending groups. Continue to hear voices and receives prn for this. He have some increase anxiety, will continue to monitor and provide a safe environment.

## 2022-01-25 NOTE — BSMART NOTE
SOCIAL WORK GROUP THERAPY PROGRESS NOTE    Group Time:  10:15am  To 11am    Group Topic:  Coping Skills    C D Issues    Group Participation:      Pt moderately involved during group discussion but remained attentive, despite some challenges from [facility]. Discussion included the process of making \"Change\" by answering questions on handout with an emphasis on strengths & weaknesses to support improving one's self esteem. Analyzing probability of feared event, potential consequences & how to manage.
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
## 2022-01-25 NOTE — GROUP NOTE
C[facility]  GROUP DOCUMENTATION INDIVIDUAL                                                                          Group Therapy Note    Date: 1/24/2022    Group Start Time: 2000  Group End Time: 2030  Group Topic: Medication    [facility] 1 ADULT CHEM DEP    Louise Smith RN    IP [facility] GROUP DOCUMENTATION GROUP    Group Therapy Note    Attendees: 5         Attendance: Attended    Patient's Goal:  Understanding the importance of medication compliance. Interventions/techniques: Informed    Follows Directions: Followed directions    Interactions: Interacted appropriately    Mental Status: Anxious    Behavior/appearance: Cooperative and Disheveled    Goals Achieved:  Identified feelings      Real Nam RN
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
## 2022-01-25 NOTE — BH NOTES
Arian Baker is meal and med compliant, free from falls and harm. He continues to endorse auditory hallucinations and feeling tremors. He was provided PRN Haldol and scheduled Cogentin. This morning before MD rounds he came to nurses desk complaining of anxiety, \"I'm not ready to leave yet\". At that time pt was provided PRN Vistaril and reassured discharge date is determined by the patient and their doctor. He has also received PRN Tylenol for back pain. Has been in the dayroom the majority of the day interacting with peers. Will continue to provide support as needed.

## 2022-01-26 PROCEDURE — 99231 SBSQ HOSP IP/OBS SF/LOW 25: CPT | Performed by: PSYCHIATRY & NEUROLOGY

## 2022-01-26 PROCEDURE — 74011250637 HC RX REV CODE- 250/637: Performed by: PSYCHIATRY & NEUROLOGY

## 2022-01-26 PROCEDURE — 65220000003 HC RM SEMIPRIVATE PSYCH

## 2022-01-26 RX ADMIN — ACETAMINOPHEN 650 MG: 325 TABLET ORAL at 13:03

## 2022-01-26 RX ADMIN — OLANZAPINE 5 MG: 5 TABLET, FILM COATED ORAL at 08:10

## 2022-01-26 RX ADMIN — TRAZODONE HYDROCHLORIDE 200 MG: 100 TABLET ORAL at 20:08

## 2022-01-26 RX ADMIN — BENZTROPINE MESYLATE 1 MG: 1 TABLET ORAL at 08:11

## 2022-01-26 RX ADMIN — HYDROXYZINE PAMOATE 50 MG: 50 CAPSULE ORAL at 18:05

## 2022-01-26 RX ADMIN — BENZTROPINE MESYLATE 1 MG: 1 TABLET ORAL at 20:08

## 2022-01-26 RX ADMIN — HYDROXYZINE PAMOATE 50 MG: 50 CAPSULE ORAL at 09:37

## 2022-01-26 RX ADMIN — VENLAFAXINE HYDROCHLORIDE 225 MG: 150 CAPSULE, EXTENDED RELEASE ORAL at 08:10

## 2022-01-26 RX ADMIN — OLANZAPINE 20 MG: 10 TABLET, FILM COATED ORAL at 18:05

## 2022-01-26 NOTE — BSMART NOTE
History of Presenting Problem: Philip Valles is a 32 yo M with PMH substance abuse, Schizoaffective disorder, ADHD, Asperger's, schizophrenia who was admitted for SI and hallucinations. He denies HI. Tox screen positive for THC. EtOH and Rapid COVID both negative. He reports chronic back pain but no new complaints today. SW made contact with patient as he engaged with peers in the milieu. Patient reports he is feeling better. He endorses auditory hallucinations however, reports they are diminishing. Patient is praised for progressing treatment goals. SW will continue with supportive and will assist as needed. SW will assist patient with outpatient treatment. SW will continue with supportive counseling and will assist as needed.     MELISA Goldberg

## 2022-01-26 NOTE — BH NOTES
Pt. has odd affect and labile mood. He endorses auditory hallucination, bad dreams and trouble sleeping at night. He is meal, medication and group compliant. Will continue to monitor for safety and provide support as needed.

## 2022-01-26 NOTE — BH NOTES
Pt appeared to have slept for 6.75 hours thus far. His extremities move about often in his sleep and he has different facial expressions; appears to be in a dream-like state. Will continue to monitor for safety.

## 2022-01-26 NOTE — BSMART NOTE
ART THERAPY GROUP PROGRESS NOTE    PATIENT SCHEDULED FOR GROUP AT: 14:00    ATTENDANCE: Full    PARTICIPATION LEVEL: Participates fully in the art process    ATTENTION LEVEL: Able to focus on task    FOCUS: Combating cognitive distortions and negative thinking     SYMBOLIC & THEMATIC CONTENT AS NOTED IN IMAGERY: He was slightly calmer than noted this morning and was invested in the task at hand. He participated in group discussion and was able to identify personal strengths with encouragement. His thought process is concrete and at times rigid.

## 2022-01-26 NOTE — BSMART NOTE
ART THERAPY GROUP PROGRESS NOTE    PATIENT SCHEDULED FOR GROUP AT: 10:00    ATTENDANCE: Full    PARTICIPATION LEVEL: Participates fully in the art process    ATTENTION LEVEL : Able to focus on task    FOCUS: Mindfulness    SYMBOLIC & THEMATIC CONTENT AS NOTED IN IMAGERY: He continues to focus and ruminate on the negative. However, he was able to focus on the art directive for longer periods than noted in yesterday's group and was calmer in the sense he was not as antsy in his seat. He claimed that he \"still feels suicidal and scared,\" and was responsive to reassurance and positive feedback from group.

## 2022-01-26 NOTE — GROUP NOTE
[facility]  GROUP DOCUMENTATION INDIVIDUAL                                                                          Group Therapy Note    Date: 1/25/2022    Group Start Time: 2000  Group End Time: 2015  Group Topic: Medication    [facility] 1 ADULT CHEM DEP    Matt Cardona RN    [facility] GROUP DOCUMENTATION GROUP    Group Therapy Note    Attendees:5         Attendance: Attended    Patient's Goal:  Understanding the importance of medication compliance. Interventions/techniques: Informed    Follows Directions: Followed directions    Interactions: Interacted appropriately    Mental Status: Calm    Behavior/appearance: Cooperative    Goals Achieved:  Identified feelings      Katelynn Khalil RN

## 2022-01-26 NOTE — PROGRESS NOTES
Problem: Suicide  Goal: *STG: Remains safe in hospital  Description: Pt. will be monitored every 15 minutes for safety, free from self harm, harming others and falls. Outcome: Progressing Towards Goal  Goal: *STG: Attends activities and groups  Description: Pt. will attend and participate to his daily activities and groups at least 2 x day. Outcome: Progressing Towards Goal  Goal: *STG/LTG: Complies with medication therapy  Description: Pt. will take his scheduled daily medications . Outcome: Progressing Towards Goal     Problem: Anxiety-Behavioral Health (Adult/Pediatric)  Goal: *STG: Attends activities and groups  Description: Pt will attend 3 out of 5 groups daily. Outcome: Progressing Towards Goal   Patient states he is feeling somewhat better. He states he slept for  about 3-4 hours. Patient had continue to hear voices. Patient has anxiety but receives prns for this will continue to monitor.
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
## 2022-01-26 NOTE — BH NOTES
SBAR report received from [facility]. Behavioral health rounds completed. Pt does not appear to be in distress. Pt encouraged to seek staff for questions and concerns.

## 2022-01-27 PROCEDURE — 65220000003 HC RM SEMIPRIVATE PSYCH

## 2022-01-27 PROCEDURE — 74011250637 HC RX REV CODE- 250/637: Performed by: PSYCHIATRY & NEUROLOGY

## 2022-01-27 PROCEDURE — 99231 SBSQ HOSP IP/OBS SF/LOW 25: CPT | Performed by: PSYCHIATRY & NEUROLOGY

## 2022-01-27 RX ORDER — BENZTROPINE MESYLATE 1 MG/1
2 TABLET ORAL 2 TIMES DAILY
Status: DISCONTINUED | OUTPATIENT
Start: 2022-01-27 | End: 2022-01-31 | Stop reason: HOSPADM

## 2022-01-27 RX ADMIN — HYDROXYZINE PAMOATE 50 MG: 50 CAPSULE ORAL at 07:57

## 2022-01-27 RX ADMIN — VENLAFAXINE HYDROCHLORIDE 225 MG: 150 CAPSULE, EXTENDED RELEASE ORAL at 08:01

## 2022-01-27 RX ADMIN — HYDROXYZINE PAMOATE 50 MG: 50 CAPSULE ORAL at 14:20

## 2022-01-27 RX ADMIN — ACETAMINOPHEN 650 MG: 325 TABLET ORAL at 17:18

## 2022-01-27 RX ADMIN — OLANZAPINE 5 MG: 5 TABLET, FILM COATED ORAL at 08:03

## 2022-01-27 RX ADMIN — BENZTROPINE MESYLATE 2 MG: 1 TABLET ORAL at 20:42

## 2022-01-27 RX ADMIN — BENZTROPINE MESYLATE 1 MG: 1 TABLET ORAL at 07:57

## 2022-01-27 RX ADMIN — OLANZAPINE 20 MG: 10 TABLET, FILM COATED ORAL at 17:18

## 2022-01-27 RX ADMIN — TRAZODONE HYDROCHLORIDE 200 MG: 100 TABLET ORAL at 21:24

## 2022-01-27 NOTE — BSMART NOTE
History of Presenting Problem: Philip Valles is a 32 yo M with PMH substance abuse, Schizoaffective disorder, ADHD, Asperger's, schizophrenia who was admitted for SI and hallucinations. He denies HI. Tox screen positive for THC. EtOH and Rapid COVID both negative. He reports chronic back pain but no new complaints today. SW made contact with patient as he continues to address concerns related to treatment. Patient reports he has been left feeling quite hungry and is requesting ENSURE for further nourishment. He continues to endorse faint hallucinations. He reports feeling better but needs \"more time in the hospital\". SW encouraged patient to utilize coping skills and strategies learned in groups. SW addressed medication compliance. SW Collateral:  SW made contact with [facility] worker with [facility] ([phone]) who will resume services with patient upon discharge. She reports patient will return to his home and she will assist with coordinating follow up care. SW will continue with contact to ensure safe and effective disposition.       JESSENIA SiuE

## 2022-01-27 NOTE — CONSULTS
Nutrition Assessment (Disaster Mode)    Type and Reason for Visit: Initial,Consult    Nutrition Recommendations/Plan:   - Continue regular diet with double portions  - Provide Ensure Enlive once daily per patient request 2/2 increased appetite     Malnutrition Assessment:  Malnutrition Status: No malnutrition    Nutrition Assessment:   Nutrition Assessment: Tolerating diet. Pt endorses increased appetite, receiving double portions and consuming all of it. Pt requesting Ensure supplements. Discussed increased appetite in relation to medications and balancing nutritional needs and healthy intake. Encouraged pt to limit to one per day as a snack, pt agreeable. Nutrition History and Allergies: PMH: ADHD, anxiety, aspergers disorder, depression, bipolar, schizophrenia. Admitted for SI. Endorses good appetite PTA. Stable weight hx. NKFA. Cultural/Mu-ism/Ethnic Food Preference(s): None     Total Energy Requirements (kcals/day): [id number] Energy Requirements Based On: Jaqueline Ponce (1.2-1.3) Weight Used for Energy Requirements: Current  Total Protein Requirements (g/day): 77-96 Weight Used for Protein Requirements: Current (0.8-1)  Total Fluid Requirements (ml/day): 5754-1178 Method Used for Fluid Requirements: 1 ml/kcal    Current Nutrition Therapies:  ADULT DIET Regular; double portions     Anthropometric Measures:  Height: 5' 11\" (180.3 cm) Weight: 95.7 kg (211 lb) Body mass index is 29.43 kg/m².   Admission Body Weight: 210 lb 15.7 oz  Ideal Body Weight (lbs) (Calculated): 172 lbs Ideal Body Weight (Kg) (Calculated): 78 kg % IBW (Calculated): 122.7 %  Usual Body Weight: 86.2 kg (190 lb) % Weight Change (Calculated): 11.1            Nutrition Diagnosis:   No nutrition diagnosis at this time      Nutrition Interventions:   Food and/or Nutrient Delivery: Continue current diet,Start oral nutrition supplement  Nutrition Education/Counseling: No recommendations at this time  Coordination of Nutrition Care: Continue to monitor while inpatient       Goals: PO nutrition intake will continue to meet >75% of patients estimated nutritional needs by next follow up date. Nutrition Monitoring and Evaluation: Patient will be monitored per nutrition standards of care. Consult Dietitian if nutrition intervention essential to patient care is needed.    Behavioral-Environmental Outcomes: None identified  Food/Nutrient Intake Outcomes: Food and nutrient intake  Physical Signs/Symptoms Outcomes: Meal time behavior    Discharge Planning: No discharge needs at this time    Albino Nunez RD on 1/27/2022 at 2:17 PM  Contact: [phone]    Disaster Mode Active

## 2022-01-27 NOTE — BSMART NOTE
ART THERAPY GROUP PROGRESS NOTE    PATIENT SCHEDULED FOR GROUP AT: 13:30    ATTENDANCE: Full    PARTICIPATION LEVEL: Needs only minimal encouragement    ATTENTION LEVEL : Able to focus on task    FOCUS: Strengths and Supports    SYMBOLIC & THEMATIC CONTENT AS NOTED IN IMAGERY:  Pt was anxious but focused on the art task. He stated he was \"scared and dizzy\" and excused himself to his room. He then came back to group and participated in group discussion. He stated his strengths are \"kind, funny, and sweet hearted. \" He can used these strengths for his treatment by \"talking to people and asking for help. \" He recognized \"I am not the only one with problems. \" He also offered encouragement and support to group members. Pt continues to be hyper focused on \"not wanting to leave until Monday. \"

## 2022-01-27 NOTE — BH NOTES
Pt coming up to desk c/o anxiety 7/10. Pt received PRN Vistaril. Will continue to monitor for effectiveness.

## 2022-01-27 NOTE — BSMART NOTE
SOCIAL WORK GROUP THERAPY PROGRESS NOTE    Group Time:  10:15am to 11am    Group Topic:  Coping Skills    C D Issues    Group Participation:      Pt moderately involved during group discussion but remained attentive. Still anxious at times. Was open about his hx of hurt & fear that triggers some irritability. Looked at the \"Process of setting Goals\", the value of a \"daily\" /  \"weekly\" schedule as tool to build self esteem, sharpen decision making skills and help define one's reality. Differences between Assertive, Aggressive & Non-Assertive Behaviors. Middle of session experienced some EPS needing nurse to assess his vitals. Was ok.

## 2022-01-27 NOTE — BSMART NOTE
ART THERAPY GROUP PROGRESS NOTE    PATIENT SCHEDULED FOR GROUP AT: 9:30    ATTENDANCE: Full    PARTICIPATION LEVEL: Participates fully in the art process    ATTENTION LEVEL : Able to focus on task    FOCUS: Mindfulness    SYMBOLIC & THEMATIC CONTENT AS NOTED IN IMAGERY:   Pt was anxious and responsive to redirection. He was able to complete the art task and participate in group discussion. Pt continues to focus on the negative for example saying \"I don't feel safe right now and I don't have a support system outside of here. \" Pt responded positively when group members reassured him.

## 2022-01-27 NOTE — PROGRESS NOTES
Problem: Suicide  Goal: *STG: Remains safe in hospital  Description: Pt. will be monitored every 15 minutes for safety, free from self harm, harming others and falls. Outcome: Progressing Towards Goal  Goal: *STG: Attends activities and groups  Description: Pt. will attend and participate to his daily activities and groups at least 2 x day. Outcome: Progressing Towards Goal  Goal: *STG/LTG: Complies with medication therapy  Description: Pt. will take his scheduled daily medications . Outcome: Progressing Towards Goal     Pt presents with bright affect, anxious mood. Pt has been social on the unit. Pt continues to endorse auditory hallucinations, stating \"My voices seem to get worse around 1. I feel so anxious when they come back. \" Pt is participative in groups and is adherent with unit guidelines. Pt denies SI/HI at this time. Pt is medication compliant, and received PRN Vistaril this morning. Will continue to monitor.

## 2022-01-28 PROCEDURE — 65220000003 HC RM SEMIPRIVATE PSYCH

## 2022-01-28 PROCEDURE — 74011250637 HC RX REV CODE- 250/637: Performed by: PSYCHIATRY & NEUROLOGY

## 2022-01-28 PROCEDURE — 99231 SBSQ HOSP IP/OBS SF/LOW 25: CPT | Performed by: PSYCHIATRY & NEUROLOGY

## 2022-01-28 RX ORDER — IBUPROFEN 600 MG/1
600 TABLET ORAL
Status: DISCONTINUED | OUTPATIENT
Start: 2022-01-28 | End: 2022-01-31 | Stop reason: HOSPADM

## 2022-01-28 RX ADMIN — ACETAMINOPHEN 650 MG: 325 TABLET ORAL at 08:05

## 2022-01-28 RX ADMIN — HYDROXYZINE PAMOATE 50 MG: 50 CAPSULE ORAL at 10:04

## 2022-01-28 RX ADMIN — TRAZODONE HYDROCHLORIDE 200 MG: 100 TABLET ORAL at 20:03

## 2022-01-28 RX ADMIN — BENZTROPINE MESYLATE 2 MG: 1 TABLET ORAL at 20:03

## 2022-01-28 RX ADMIN — OLANZAPINE 20 MG: 10 TABLET, FILM COATED ORAL at 17:09

## 2022-01-28 RX ADMIN — OLANZAPINE 5 MG: 5 TABLET, FILM COATED ORAL at 08:05

## 2022-01-28 RX ADMIN — IBUPROFEN 600 MG: 600 TABLET ORAL at 13:40

## 2022-01-28 RX ADMIN — VENLAFAXINE HYDROCHLORIDE 225 MG: 150 CAPSULE, EXTENDED RELEASE ORAL at 08:05

## 2022-01-28 RX ADMIN — BENZTROPINE MESYLATE 2 MG: 1 TABLET ORAL at 08:05

## 2022-01-28 NOTE — BH NOTES
Pt presents with bright affect, anxious mood. Pt has been social on the unit, playing games in the day area with his peers for much of the days. Pt is participative in groups and is adherent with unit guidelines. Pt states, \"I feel anxious about going home. I feel like my voices are bothering me. \" Pt denies SI/HI at this time. Pt is medication compliant, and received PRN Vistaril twice today. Will continue to monitor.

## 2022-01-28 NOTE — BSMART NOTE
ART THERAPY GROUP PROGRESS NOTE    PATIENT SCHEDULED FOR GROUP AT: 13:00    ATTENDANCE: Full    PARTICIPATION LEVEL: Needs extra prompting and re-direction    ATTENTION LEVEL : Needs occasional re-direction    FOCUS: Identifying Emotions    SYMBOLIC & THEMATIC CONTENT AS NOTED IN IMAGERY:  Pt was anxious and continued to focus on the negative. His thought process is rigid and concrete. Pt needed assistance with the task directive and re-direction from thinking negatively. Instead of depicting something \"healthy, safe, and positive\" as directed by this writer, he depicted a picture of a \"black mushroom\" and claimed it was a \"demon. \" He then excused himself to his room and claimed that he was \"seeing demons. \"  He spoke with art therapist after group and discussed different techniques to \"slow down\" when he begins to think negatively and orient self to reality.
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
## 2022-01-28 NOTE — BSMART NOTE
Gloria Hayes :  ART THERAPY GROUP PROGRESS NOTE    PATIENT SCHEDULED FOR GROUP AT: 9:30    ATTENDANCE: Full    PARTICIPATION LEVEL: Participates fully in the art process    ATTENTION LEVEL : Able to focus on task    FOCUS: Mindfulness    SYMBOLIC & THEMATIC CONTENT AS NOTED IN IMAGERY:  Pt was interactive with other group members and had a brighter affect. He was able to complete the art task. Pt stated he had \"pain but it is bearable. \" He was able to ask for help with the art directive and responded well to direction as opposed to in previous groups. He spoke about negativity in his life but was able to think positively unlike previous groups. During group discussion he recognized he has progressed since admission.
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
## 2022-01-28 NOTE — BSMART NOTE
SOCIAL WORK GROUP THERAPY PROGRESS NOTE    Group Time:  10:15am to 11am    Group Topic:  Coping Skills    C D Issues    Group Participation:      Pt moderately involved during group discussion but remained attentive. Remains anxious & attention seeking. Looked at the \"Process of setting Goals\", the value of a \"daily\" /  \"weekly\" schedule as tool to build self esteem, sharpen decision making skills and help define one's reality. Personal Emergency Plan, including triggers & what to do & not to do.

## 2022-01-28 NOTE — PROGRESS NOTES
Problem: Suicide  Goal: *STG: Remains safe in hospital  Description: Pt. will be monitored every 15 minutes for safety, free from self harm, harming others and falls. Outcome: Progressing Towards Goal     Problem: Suicide  Goal: *STG: Attends activities and groups  Description: Pt. will attend and participate to his daily activities and groups at least 2 x day. Outcome: Progressing Towards Goal     Patient has been in day room most of day shift. Patient has attended all groups and activities and has been active participant. Patient has been compliant with scheduled medicaitons and received PRN Tylenol for complaint of headache and later in morning, received PRN Vistaril cap PO for complaint of anxiety. Patient has eaten all meals and snacks and has been appropriate with staff and peer interactions. Patient compliant with unit guidelines, free from falls and harm. Will continue to monitor and provide interventions as appropriate.

## 2022-01-29 PROCEDURE — 74011250637 HC RX REV CODE- 250/637: Performed by: PSYCHIATRY & NEUROLOGY

## 2022-01-29 PROCEDURE — 65220000003 HC RM SEMIPRIVATE PSYCH

## 2022-01-29 PROCEDURE — 99231 SBSQ HOSP IP/OBS SF/LOW 25: CPT | Performed by: PSYCHIATRY & NEUROLOGY

## 2022-01-29 RX ADMIN — VENLAFAXINE HYDROCHLORIDE 225 MG: 150 CAPSULE, EXTENDED RELEASE ORAL at 08:03

## 2022-01-29 RX ADMIN — TRAZODONE HYDROCHLORIDE 200 MG: 100 TABLET ORAL at 20:05

## 2022-01-29 RX ADMIN — BENZTROPINE MESYLATE 2 MG: 1 TABLET ORAL at 08:03

## 2022-01-29 RX ADMIN — OLANZAPINE 5 MG: 5 TABLET, FILM COATED ORAL at 08:03

## 2022-01-29 RX ADMIN — HYDROXYZINE PAMOATE 50 MG: 50 CAPSULE ORAL at 08:55

## 2022-01-29 RX ADMIN — IBUPROFEN 600 MG: 600 TABLET ORAL at 18:53

## 2022-01-29 RX ADMIN — IBUPROFEN 600 MG: 600 TABLET ORAL at 12:21

## 2022-01-29 RX ADMIN — HALOPERIDOL 5 MG: 5 TABLET ORAL at 13:52

## 2022-01-29 RX ADMIN — OLANZAPINE 20 MG: 10 TABLET, FILM COATED ORAL at 17:02

## 2022-01-29 RX ADMIN — BENZTROPINE MESYLATE 2 MG: 1 TABLET ORAL at 20:05

## 2022-01-29 NOTE — GROUP NOTE
IP  GROUP DOCUMENTATION INDIVIDUAL                                                                          Group Therapy Note    Date: 1/28/2022    Group Start Time: 2000  Group End Time: 2015  Group Topic: Medication    [facility] 1 ADULT CHEM DEP    Benton Galeas RN    IP [facility] GROUP DOCUMENTATION GROUP    Group Therapy Note    Attendees: 7         Attendance: Attended    Patient's Goal:  Understanding medication being provided per order and as needed. Interventions/techniques: Informed    Follows Directions: Followed directions    Interactions: Interacted appropriately    Mental Status: Calm and Flat    Behavior/appearance: Cooperative    Goals Achieved: Able to receive feedback      Additional Notes:  Pt has been sitting quietly in day area. Alert and oriented x 4. Remains compliant with medication. Denied SI/HI or AVH at present. Will conintue to monitor and support as needed.      Antoni Muir RN

## 2022-01-29 NOTE — BH NOTES
Patient received PRN Haldol 5mg tab PO for complaint of auditory hallucinations. Will monitor for effectiveness.

## 2022-01-29 NOTE — PROGRESS NOTES
[facility] [address]  Inpatient Progress Note     Date of Service: 01/29/22  Hospital Day: 11     Subjective/Interval History   01/29/22    Treatment Team Notes:  Notes reviewed and/or discussed and report that Scar Noble is a patient with a history of a schizoaffective disorder, who was going to be discharge January 28, however attention is invited to his attending physician note which is self-explanatory. Patient interview: Scar Noble was interviewed by this writer today. The patient described feeling better today. He described has some problems last night, describing \"appears to have been fasciculations. \"  However during the session today, he denies restless legs or any other type of medications related side effects. There was no evidence of fasciculations when I examined the patient, whom otherwise described that he will be ready to be discharged on Monday. Objective     Visit Vitals  /78   Pulse (!) 110   Temp 98.1 °F (36.7 °C)   Resp 17   Ht 5' 11\" (1.803 m)   Wt 95.7 kg (211 lb)   SpO2 96%   BMI 29.43 kg/m²     Increased heart rate noted above. No results found for this or any previous visit (from the past 24 hour(s)). Mental Status Examination     Appearance/Hygiene 31 y.o.  WHITE/NON- male  Hygiene: Limited but improving   Behavior/Social Relatedness Appropriate   Musculoskeletal Gait/Station: appropriate  Tone (flaccid, cogwheeling, spastic): not assessed  Psychomotor (hyperkinetic, hypokinetic): calm   Involuntary movements (tics, dyskinesias, akathisa, stereotypies): none   Speech   Rate, rhythm, volume, fluency and articulation are appropriate   Mood   anxious at times   Affect    odd   Thought Process Linear and goal directed   Thought Content and Perceptual Disturbances Denies self-injurious behavior (SIB), suicidal ideation (SI), aggressive behavior or homicidal ideation (HI)  Auditory hallucinations are less intense   Sensorium and Cognition Grossly intact   Insight  Limited but improving   Judgment  Limited but improving        Assessment/Plan      Psychiatric Diagnoses:   Patient Active Problem List   Diagnosis Code    Schizoaffective disorder, depressive type ([facility] [address] F25.1    Cigarette nicotine dependence without complication R59.273    Cannabis use disorder, moderate, dependence ([facility] [address] F12.20    Cocaine use disorder, moderate, dependence ([facility] [address] F14.20    Polysubstance abuse ([facility] [address] F19.10       Medical Diagnoses: Per attending psychiatrist    Psychosocial and contextual factors: Same    Level of impairment/disability: Severe by history    1. Current combination of medications we will maintain the same. During today's visit, there was no evidence of EPS, NMS, atropine psychosis, serotonin syndrome, or tardive dyskinesia. 2.  Reviewed instructions, risks, benefits and side effects of medications  3. Disposition/Discharge Date: self-care/home, possibly on Monday.     Nghia Campos MD, [address] [place] [address]

## 2022-01-29 NOTE — PROGRESS NOTES
Problem: Suicide  Goal: *STG: Remains safe in hospital  Description: Pt. will be monitored every 15 minutes for safety, free from self harm, harming others and falls. Outcome: Progressing Towards Goal  Goal: *STG: Attends activities and groups  Description: Pt. will attend and participate to his daily activities and groups at least 2 x day. Outcome: Progressing Towards Goal  Goal: *STG/LTG: Complies with medication therapy  Description: Pt. will take his scheduled daily medications . Outcome: Progressing Towards Goal    Anneliese Rosado is meal and med compliant. He is free from falls and harm. He has spent the day in the dayroom interacting with peers and attending groups. He states \"Yesterday I had a bad day where I wanted to hurt other people, I feel better today\". He was provided PRN Vistaril for anxiety. Will continue to provide support as needed. 17:35    Anneliese Rosado has spent the afternoon interacting with peers in the dayroom. After dinner he complained of indigestion, he was advised to avoid that menu choice from now on and discuss with MD tomorrow if symptoms don't improve. He is free from falls and harm. Will continue to provide support as needed.

## 2022-01-30 PROCEDURE — 99231 SBSQ HOSP IP/OBS SF/LOW 25: CPT | Performed by: PSYCHIATRY & NEUROLOGY

## 2022-01-30 PROCEDURE — 74011250637 HC RX REV CODE- 250/637: Performed by: PSYCHIATRY & NEUROLOGY

## 2022-01-30 PROCEDURE — 65220000003 HC RM SEMIPRIVATE PSYCH

## 2022-01-30 RX ADMIN — BENZTROPINE MESYLATE 2 MG: 1 TABLET ORAL at 08:09

## 2022-01-30 RX ADMIN — TRAZODONE HYDROCHLORIDE 200 MG: 100 TABLET ORAL at 20:00

## 2022-01-30 RX ADMIN — BENZTROPINE MESYLATE 2 MG: 1 TABLET ORAL at 20:00

## 2022-01-30 RX ADMIN — OLANZAPINE 5 MG: 5 TABLET, FILM COATED ORAL at 08:09

## 2022-01-30 RX ADMIN — BENZTROPINE MESYLATE 1 MG: 1 TABLET ORAL at 13:34

## 2022-01-30 RX ADMIN — IBUPROFEN 600 MG: 600 TABLET ORAL at 13:11

## 2022-01-30 RX ADMIN — OLANZAPINE 20 MG: 10 TABLET, FILM COATED ORAL at 18:19

## 2022-01-30 RX ADMIN — HYDROXYZINE PAMOATE 50 MG: 50 CAPSULE ORAL at 08:34

## 2022-01-30 RX ADMIN — VENLAFAXINE HYDROCHLORIDE 225 MG: 150 CAPSULE, EXTENDED RELEASE ORAL at 08:09

## 2022-01-30 NOTE — BH NOTES
Patient has been in day room throughout day and evening shifts. patient has attended groups and has been active participant. Patient has been compliant with scheduled medications and has received PRN Motrin this shift. Patient has eaten all meals and snacks and has been compliant with unit guidelines, free from falls and harm. Will continue to monitor and provide interventions and reassurance as appropriate.

## 2022-01-30 NOTE — BH NOTES
During this period (3pm-11pm) pt has been out in the milieu interacting appropriately with peers for the majority of the evening. Pt has been compliant with unit rules, medication regimen and direction from staff. When this writer asked pt about his mood pt stated, \"Being honest I feel a little depressed today. Not sure what it is I can't think of anything specific. I guess some days are just better than others but not thinking about hurting myself or anything. \" Pt has been excited for potential D/C on Monday stating, \"I've got my benefits back and and my medicine adjusted I'm feeling better and ready to get back home. \" Staff will continue rounds monitoring pt for changes in behavior, location & safety.

## 2022-01-30 NOTE — PROGRESS NOTES
[address] [facility] [address]  Inpatient Progress Note     Date of Service: 01/30/22  Hospital Day: 12     Subjective/Interval History   01/30/22    Treatment Team Notes:  Notes reviewed and/or discussed and report that Marek Mullen is a patient with a history of schizoaffective disorder, describing himself being able to be discharged tomorrow. The patient is specifically described feeling better today, and believes that he will be able to go home sometime tomorrow after he sees his psychiatrist.      Patient interview: Marek Mullen was interviewed by this writer today. During psych rounds today, there was no evidence of any type of fasciculations which he had described the day before. However further investigation of the patient's symptoms, it appears that what he described was the presence of mild EPS for which he has been prescribed with Cogentin 2 mg twice a day. The symptoms are better today than what they were on Saturday. Objective     Visit Vitals  /83   Pulse 93   Temp 97.8 °F (36.6 °C)   Resp 17   Ht 5' 11\" (1.803 m)   Wt 95.7 kg (211 lb)   SpO2 96%   BMI 29.43 kg/m²     Vitals are stable    No results found for this or any previous visit (from the past 24 hour(s)). Mental Status Examination     Appearance/Hygiene 31 y.o. WHITE/NON- male  Hygiene: Fair   Behavior/Social Relatedness Appropriate   Musculoskeletal Gait/Station: appropriate  Tone (flaccid, cogwheeling, spastic): not assessed  Psychomotor (hyperkinetic, hypokinetic): calm   Involuntary movements (tics, dyskinesias, akathisa, stereotypies): none   Speech   Rate, rhythm, volume, fluency and articulation are appropriate   Mood   anxiety has improved   Affect    off at times. Thought Process Linear and goal directed   Thought Content and Perceptual Disturbances Denies self-injurious behavior (SIB), suicidal ideation (SI), aggressive behavior or homicidal ideation (HI)  Denies auditory and visual hallucinations. Sensorium and Cognition  Grossly intact   Insight  improving slowly   Judgment  improving slowly        Assessment/Plan      Psychiatric Diagnoses:   Patient Active Problem List   Diagnosis Code    Schizoaffective disorder, depressive type ([facility] F25.1    Cigarette nicotine dependence without complication K49.826    Cannabis use disorder, moderate, dependence ([facility] F12.20    Cocaine use disorder, moderate, dependence ([facility] F14.20    Polysubstance abuse ([facility] [address] [facility] F19.10       Medical Diagnoses: Per attending psychiatrist    Psychosocial and contextual factors: Same    Level of impairment/disability: Severe by history     1. Current treatment will be maintained. Attending psychiatrist is coming back tomorrow. The decision will be made then to discharge the patient home if he is a stable. 2.  Reviewed instructions, risks, benefits and side effects of medications  3. Disposition/Discharge Date: self-care/home, possibly tomorrow.     Dinesh Hairston MD, [address] [place] [address]  Psychiatry

## 2022-01-30 NOTE — PROGRESS NOTES
Problem: Suicide  Goal: *STG: Remains safe in hospital  Description: Pt. will be monitored every 15 minutes for safety, free from self harm, harming others and falls. Outcome: Progressing Towards Goal     Problem: Depressed Mood (Adult/Pediatric)  Goal: *STG: Complies with medication therapy  Description: Pt will take medications as prescribed daily. Outcome: Progressing Towards Goal     Patient has been in day room since this nurse arrived onto unit. Patient received PRN Vistaril cap PO for anxiety earlier in shift and voiced effectiveness. Patient voiced \"I'm probably going to be in my room most of the day cause I'm anxious. \"  Patient demonstrated effectiveness of PRN by staying in day room for entire shift except when using restroom. Patient has eaten all meals and snacks and has been free from falls and harm this shift. Will continue to monitor and provide interventions and reassurance as appropriate.

## 2022-01-31 VITALS
BODY MASS INDEX: 29.54 KG/M2 | TEMPERATURE: 97.1 F | HEIGHT: 71 IN | HEART RATE: 100 BPM | WEIGHT: 211 LBS | SYSTOLIC BLOOD PRESSURE: 112 MMHG | OXYGEN SATURATION: 99 % | DIASTOLIC BLOOD PRESSURE: 73 MMHG | RESPIRATION RATE: 18 BRPM

## 2022-01-31 PROCEDURE — 74011250637 HC RX REV CODE- 250/637: Performed by: PSYCHIATRY & NEUROLOGY

## 2022-01-31 PROCEDURE — 99238 HOSP IP/OBS DSCHRG MGMT 30/<: CPT | Performed by: PSYCHIATRY & NEUROLOGY

## 2022-01-31 RX ORDER — BENZTROPINE MESYLATE 2 MG/1
2 TABLET ORAL 2 TIMES DAILY
Qty: 60 TABLET | Refills: 0 | Status: SHIPPED | OUTPATIENT
Start: 2022-01-31

## 2022-01-31 RX ORDER — VENLAFAXINE HYDROCHLORIDE 75 MG/1
225 CAPSULE, EXTENDED RELEASE ORAL DAILY
Qty: 90 CAPSULE | Refills: 0 | Status: SHIPPED | OUTPATIENT
Start: 2022-02-01

## 2022-01-31 RX ORDER — TRAZODONE HYDROCHLORIDE 100 MG/1
200 TABLET ORAL
Qty: 60 TABLET | Refills: 0 | Status: SHIPPED | OUTPATIENT
Start: 2022-01-31

## 2022-01-31 RX ORDER — OLANZAPINE 20 MG/1
20 TABLET ORAL EVERY EVENING
Qty: 30 TABLET | Refills: 0 | Status: SHIPPED | OUTPATIENT
Start: 2022-01-31

## 2022-01-31 RX ORDER — OLANZAPINE 5 MG/1
5 TABLET ORAL DAILY
Qty: 30 TABLET | Refills: 0 | Status: SHIPPED | OUTPATIENT
Start: 2022-02-01

## 2022-01-31 RX ADMIN — VENLAFAXINE HYDROCHLORIDE 225 MG: 150 CAPSULE, EXTENDED RELEASE ORAL at 08:20

## 2022-01-31 RX ADMIN — BENZTROPINE MESYLATE 2 MG: 1 TABLET ORAL at 08:19

## 2022-01-31 RX ADMIN — IBUPROFEN 600 MG: 600 TABLET ORAL at 09:28

## 2022-01-31 RX ADMIN — OLANZAPINE 5 MG: 5 TABLET, FILM COATED ORAL at 08:20

## 2022-01-31 NOTE — BH NOTES
Pt received discharge instructions, prescriptions, and emergency numbers. Pt completed satisfaction survey. All personal belongings returned to pt. Pt encouraged to follow-up with outpatient resources and to call with any questions or concerns.      Patient has a therapy appointment with Michael Lombardo LCSW on 2/2/22 @ 11:00am for therapy  Med management will be arranged after initial appointment   [facility]   [address] [place] [address]  [phone]

## 2022-01-31 NOTE — BH NOTES
Patient been discharge, discharge instructions was explained and he was given a copy. Patient was discharged at [phone] with his belongings. Patient  Ever Guaman, picked him up.

## 2022-01-31 NOTE — BSMART NOTE
History of Presenting Problem: Philip Valles is a 32 yo M with PMH substance abuse, Schizoaffective disorder, ADHD, Asperger's, schizophrenia who was admitted for SI and hallucinations. He denies HI. Tox screen positive for THC. EtOH and Rapid COVID both negative. He reports chronic back pain but no new complaints today. Patient prepared for discharge. Denies SI/HI/AVH. SW made contact with  who will continue with services.  will provide transportation upon discharge.     Ferdinand Lorenzo LCSW-E

## 2022-01-31 NOTE — DISCHARGE SUMMARY
[address] [facility] DISCHARGE    Name:  Ricky Hernandez  MR#:   [id number]  :  1995  ACCOUNT #:  [de-identified]  ADMIT DATE:  2022  DISCHARGE DATE:    IDENTIFYING DATA:  The patient presented as a 26-year-old single white male, currently living at [facility] in [place], [place]. He presented to emergency room saying he was having worsening of auditory and visual hallucinations, thoughts to overdose on all his medicine. He described longstanding paranoia which was now worse, but could not say why he was having thoughts to hurt himself. He had a long history of schizoaffective disorder. He has had multiple hospitalizations including in this facility, [facility], [address] [facility], [facility] and he had just gotten out of an 18-day hospital stay on 2021. He says he was placed back on Effexor  mg daily, trazodone 100 mg at bedtime, haloperidol 10 mg in the morning, 15 mg in the afternoon, and 20 mg at night which is quite a large dose. A long history of substance abuse, but this time had relapsed to cannabis but not back to methamphetamine. Laboratory testing done in the emergency room included normal CBC, normal BMP, negative alcohol level, urine drug screen positive for cannabis, negative rapid SARS COVID test.    HOSPITAL COURSE:  The patient was admitted to the locked adult unit where he was afforded individual, group, and milieu therapies. Physical examination was done by IRMA Santo. She had described history of nicotine dependence, past tonsillectomy, history of Asperger's disorder and ADHD. No additional recommendations were made. While in the hospital, he had medications adjusted. We discontinued haloperidol since it seems it was not working for him since he was on such a huge dose.   He had been placed on olanzapine stabilizing at 5 mg in the morning and 20 mg at night, trazodone back to 200 mg at bedtime which he previously took, venlafaxine  mg daily which had increased dose from 75 mg, benztropine 2 mg b.i.d. for extrapyramidal symptoms which he seemed to have had much of the hospital stay and got better with the use of the benztropine, acetaminophen p.r.n. pain. At the time of discharge, he was much better. He was no longer having auditory hallucinations. He still would say that he saw \"blood demons\" in the corner of his vision, when looking nothing was there, but he had always had this and this does not go away from him. He also says that he gets feeling of paranoia which also never goes away but he was able to ignore it. He was denying any homicidal or suicidal ideas and said he felt better than he had in many years. He wished to stay on this dose of medication. CONDITION ON DISCHARGE:  Fair. PROGNOSIS:  Fair. ASSESSMENT:  AXIS I:  Schizoaffective disorder depressed type. Cannabis use disorder, severe. Cocaine use disorder, moderate. Amphetamine use disorder, moderate. Nicotine use disorder, moderate. AXIS II:  Borderline intellectual function. AXIS III:  Extrapyramidal reactions due to antipsychotic medication. DISPOSITION:  Discharged to self. Follow up [facility]. Follow up with own primary care provider as needed. MEDICATIONS:  Benztropine 2 mg tablet 1 b.i.d. #60; olanzapine 5 mg tablet 1 daily, #30; olanzapine 20 mg tablet one at bedtime, #30; trazodone 100 mg tablet 2 at bedtime, #60; Venlafaxine XR 75 mg capsule 3 daily #90.       Sidney Stephenson MD      [id number]  D:  01/31/2022 10:44  T:  01/31/2022 12:44  JOB #:  [id number]

## 2022-02-01 ENCOUNTER — TELEPHONE (OUTPATIENT)
Dept: ADDICTION MEDICINE | Age: 27
End: 2022-02-01

## 2022-02-01 NOTE — TELEPHONE ENCOUNTER
This writer attempted to complete follow-up call at [phone] to phone number on file of [phone]. Phone number is restricted. Unable to leave a message requesting a return call. No alternate phone number listed.
